# Patient Record
(demographics unavailable — no encounter records)

---

## 2018-07-23 NOTE — RAD
HISTORY: SOB



COMPARISONS: December 18, 2015



TECHNIQUE: Multiple contiguous axial CT scans of the chest were obtained after the

administration of nonionic intravenous contrast, timed to the pulmonary arterial phase of

contrast enhancement.. Coronal and sagittal multiplanar reformations are also submitted

for review.



FINDINGS: Evaluation is limited by suboptimal contrast opacification. The attenuation of

the main pulmonary artery is less than 200 Hounsfield units which is considered

nondiagnostic for the detection of pulmonary embolism.



NECK AND THYROID: The lower neck and thyroid are unremarkable.

CHEST WALL: There is no lower cervical, axillary, or supraclavicular lymphadenopathy by

size criteria.



HEART AND PERICARDIUM: The heart is unremarkable.

AORTA AND PULMONARY VASCULATURE: As noted above, the attenuation of the main pulmonary

artery is not considered to be diagnostic for the detection of pulmonary embolism. There

is no large or proximal filling defect to suggest pulmonary embolism. The aorta is

unremarkable for technique.



MEDIASTINUM: There is no mediastinal lymphadenopathy by size criteria.

ISHA: There is a 1 cm short axis right hilar lymph node.



AIRWAY AND ESOPHAGUS: The airway is unremarkable, without endobronchial filling defect.

The esophagus is grossly normal.

LUNG PARENCHYMA: There is patchy alveolar opacification of the lung bases bilaterally.

There is calcified granuloma of the lingula.

PLEURA: No pleural abnormalities are noted.



UPPER ABDOMEN: There is fatty infiltration of the liver.

BONES AND SOFT TISSUES: Degenerative changes are noted of the spine.



OTHER: None.



IMPRESSION: 

1.  THE ATTENUATION OF THE MAIN PULMONARY ARTERY IS LESS THAN 200 HOUNSFIELD UNITS WHICH

IS CONSIDERED NONDIAGNOSTIC FOR THE DETECTION OF PULMONARY EMBOLISM. THE CHANGE FROM THE

PULMONARY REPORT WAS DISCUSSED WITH DR. OPLLACK AT APPROXIMATELY 8:09 AM ON JULY 23, 2018.

2.  PATCHY AIRSPACE DISEASE OF THE LOWER LUNGS BILATERALLY.

3.  BORDERLINE ENLARGED RIGHT HILAR LYMPH NODE.

4.  FATTY INFILTRATION OF THE LIVER..



R3

## 2018-07-23 NOTE — XMS REPORT
Ysabel Vazquez

 Created on:2018



Patient:Ysabel Vazquez

Sex:Female

:1958

External Reference #:[id number]





Demographics







 Address  [address]



   [place] [address]

 

 Mobile Phone  [phone]

 

 Email Address  [email or web address] [email or web address]

 

 Preferred Language  English

 

 Marital Status  Not  Or 

 

 Restoration Affiliation  Unknown

 

 Race  White

 

 Ethnic Group  Not  Or 









Author







 Organization  [facility]

 

 Address  [address]



   [place] [address]

 

 Phone  [phone]









Support







 Name  Relationship  Address  Phone

 

 Ijeoma Hightower  Unavailable  Unavailable  [phone]









Care Team Providers







 Name  Role  Phone

 

 Sushma Hameed MD  Primary Care Physician  Unavailable









Payers







 Type  Date  Identification Numbers  Payment Provider  Subscriber

 

 Commercial  Effective:  Policy Number: [id number]  BS Neela Vazquez



   2012      









 PayID: [id number]  [address] [address]









 [place] [address]









 Medigap Part B  Effective:  Policy Number:  Blue Shield Ppo  Ysabel GUADARRAMA George



   2008  [id number]    









 Expires: 2011  PayID: [id number]  [address] [address]









 [place] [address]









 Medigap Part B  Effective: 2011  Policy Number:  BS Neela Vazquez



     [id number]    









 Expires: 2011  PayID: [id number]  [address] [address]









 [place] [address]









 Workers Compensation  Effective:  Group Number:  Lisaerik GUADARRAMA George



   2014    Center  









 Onset: 2014  [address] [place] [address]







Problems







 Date  Description  Provider  Status

 

 Onset: 2014  Pure hyperglyceridemia  Sushma Hameed M.D.  Active

 

 Onset: 2014  Type 2 diabetes mellitus  Sushma Hameed M.D.  Active

 

 Onset: 2014  Obstructive sleep apnea syndrome  Sushma Hameed M.D.  
Active

 

 Onset: 2014  Hypothyroidism  Sushma Hameed M.D.  Active









   Note: XRT for graves









 Onset: 10/09/2014  Obstructive sleep apnea of adult  Iván Newby M.D.  Active

 

 Onset: 10/09/2014  Asthma without status asthmaticus  Iván Newby M.D.  Active

 

 Onset: 2015  Endometrial carcinoma  Sushma Hameed M.D.  Active









   Note: R2hG1E5 s/p hysterectomy & adjuvant XRT ( intravaginal bracytherapy )









 Onset: 2015  Former heavy tobacco smoker  Sushma Hameed M.D.  Active









   Note: 32 pk yr quit   CTA chest nl in 12/15









 Onset: 2015  Psoriasis  Sushma Hameed M.D.  Active









   Note: stable









 Onset: 2016  Morbid obesity  Lindsey Johnston MD  Active

 

 Onset: 2016  Osteoarthritis of knee  Sushma Hameed M.D.  Active

 

 Onset: 2017  Chronic obstructive lung disease  Sushma Hameed M.D.  
Active

 

 Onset: 2015  Hernia of anterior abdominal wall  Sushma Hameed M.D.  
Resolved









 Resolved: 2016









   Note: 6.2 cm conting loops of bowel







Family History







 Date  Family Member(s)  Problem(s)  Comments

 

   General  lung ca  mother

 

   General  breast ca  sister at age 60

 

   General  Diabetes, Non Insulin Dependent  sister ( age 62)

 

   General  Bladder Cancer  brother at age 63

 

   Children  3  







Social History







 Type  Date  Description  Comments

 

 Marital Status    Single  

 

 Lives With    Alone  Has 3 children

 

 Occupation    Currently Working  [employer] (



       personal needs )

 

 Work Status    Currently Working  

 

 Cigarette Use    Former Cigarette Smoker  

 

 ETOH Use    Denies alcohol use  

 

 Smoking    Patient is a former smoker  32 pk yr quit in 

 

 Recreational Drug Use    Denies Drug Use  

 

 Daily Caffeine    Consumes on average 1 cup of  



     hot tea per day  

 

 Exercise Type/Frequency    Exercises rarely  

 

 Exercise Type/Frequency    Exercises sporadically  walking







Allergies, Adverse Reactions, Alerts







 Date  Description  Reaction  Status  Severity  Comments

 

 2014  Levaquin  Urticaria  active    

 

 2013  NKDA    inactive    







Medications







 Medication  Date  Status  Form  Strength  Qnty  SIG  Indications  Ordering



                 Provider

 

 Prednisone    Active  Tablets  10mg  30tab  30mg daily  J44.1  Lindsey



   /        s  for 1 week,    Vickie,



             20mg daily    MD



             for 1 week,    



             10 mg daily    



             for 1 week    

 

 Doxycycline    Active  Capsules  100mg  14cap  one tablet  J44.1  Lindsey



 Hyclate  /        s  twice daily    Vickie,



             for 7 days.    MD Goodson    Active  Tablets  50mg  90tab  1 by mouth  E11.9  Sushma



           s  every day    JAVIER Hameed

 

 Cholestyramine    Active  Packet  4gm  1unit  4 gms every    Sushma



           s  day as    Zari,



             needed    M.D.

 

 Atorvastatin    Active  Tablets  20mg  90tab  take one  E78.2  Sushma



 Calcium          s  tablet by    Zari



             mouth at    M.D.



             bedtime    

 

 Cardizem CD    Active  Caps ER  120mg  90cap  1 by mouth  R00.2  Sushma



       24HR    s  every day    JAVIER Hameed

 

 Symbicort    Active  Aerosol  160-4.5mc  1unit  1 puffs  J45.909  Lindsey      g/Act  s  puffs twice    erik Johnston MD    Active  Device    1unit  check    Sushma



 Blood Glucose          s  fingerstick    Zari,



 Monitoring            three times    M.D.



 System            daily or as    



             needed - DX:    



             250.00    

 

 Freestyle Lite    Active  Strips    100un  test up to    Sushma



 Test          its  3times a day    Zari,



             or as needed    M.D.



              dx code:    



             250.00    

 

 Freestyle    Active  Misc    100un  Test three    Sushma



 Lancets          its  times daily    Zari,



             or as needed    M.D.



             - .00    

 

 Metformin HCL    Active  Tablets  850mg  60tab  take one            s  tablet by    char Hameed twice    M.D.



             a day    

 

 Albuterol    Active  Nebulizer  (2.5mg/3M  225ml  as needed 4    Lindsey



 Sulfate  /      L) 0.083%    times  a day    MD Vickie

 

 Ventolin HFA    Active  Aerosol  108(90Bas  1unit  2 puffs by    Lindsey



   /0000      e)  s  mouth four    Vickie,



         mcg/Act    times a day    MD



             as needed    

 

 Mucinex    Active  Tablets ER  600mg  60tab  1 tab bid po    Unknown



       12HR    s  prn    

 

 Imodium A-D    Active  Tablets  2mg        Unknown



                 

 

 Levothyroxine    Active  Tablets  175mcg  90tab  take one    Sushma



 Sodium          s  tablet by    char Hameed every    M.D.



             day    

 

 Bipap  00/00  Active  Device      use at at  G47.33  Unknown



   /0000          bedtime    

 

 Bipap Mask And  00/00  Active  Device      bipap  G47.33  Unknown



 Supplies  /0000          supplies -    



             headgear,    



             cushion,    



             tubing,    



             filters,    



             water    



             chamber for    



             sleep apnea    



             dx G43.77    

 

                 

 

 Cephalexin    Hx  Tablets  500mg    1 tab every              6 hrs X 7    Hameed,



   -          days    M.D.



                 

 

 Atorvastatin  03/15  Hx  Tablets  20mg  90tab  take one  E78.2  Sushma



 Calcium  /2016        s  tablet by    Hameed,



   -          mouth at    M.D.



             bedtime    



   /              

 

 Januvia  03/15  Hx  Tablets  25mg  30tab  take one  E11.9  Sushma



           s  tablet by    Zari,



   -          mouth every    M.D.



             day    



   /              

 

 Fluconazole    Hx  Tablets  100mg  10tab  1 tab by  110.8  Sushma



           s  mouth daily    Zari,



   -          x 10 days    M.D.



                 

 

 Warfarin Sodium    Hx  Tablets  10mg  60tab  Take one by    Sushma



           s  mouth 5 days    Hameed,



   -          a week    M.D.



             (takes 5 mg.    



   /          2 days a    



             week) or as    



             directed    

 

 Freestyle Lite    Hx      100un  use as    Sushma



 Test Strip  /        its  directed    Hameed,



   -          three times    M.D.



             daily or as    



             needed dx:    



             250.00    

 

 Ferrous Fumarate    Hx  Tablets  324mg  60tab  1 tab daily    Sushma        s      Zari,



   -              M.D.



   03/15              



   /2016              

 

 Stacy Contour    Hx  Strips    100un  test three    Sushma



 Blood Glucose  /        its  times daily    Hameed,



 Test Strips  -          or as needed    M.D.



                 

 

 Stacy Microlet    Hx  Misc    100un  three times    Sushma



 Lancets          its  daily or as    Hameed,



   -          needed    M.D.



                 

 

 Polytrim    Hx  Solution  25679-8.1  1unit  1 drop both  372.00  Christopher



   2015      Unit/ML-%  s  eyes every    French,



   -          four hours    NP



             while awake    



             x's 5 days    

 

 Mometasone    Hx  Cream  0.1%  1unit  apply to  691.8  Sushma



 Fur        s  affected    Zari,



   -          areas twice    M.D.



   /10          a day 10    



   /2015          days only    

 

 Keflex    Hx  Capsules  500mg  40cap  1 by mouth            s  four times a    Ordering



   -          day for 10    Provider



   12/11          days.    



                 

 

 Olux    Hx  Foam  0.05%  1unit  apply daily  696.8          s  X 10 days    Quang Hameed M.D.



                 

 

 Gemfibrozil    Hx  Tablets  600mg  180ta  Take One            bs  Tablet By    Zari,



   -          Mouth Twice    M.D.



             A Day    



   /              

 

 Advair Diskus    Hx  Aerosol  500-50mcg    1 puff bid  V72.      /Dose        Quang Hameed              M.DKelley



                 

 

 Metformin HCL    Hx  Tablets  500mg  180ta  1 po bid  V72.84          Quang Bacon M.D.



                 

 

 Metformin HCL    Hx  Tablets  500mg  45tab  1 po bid X 1  V784  [facility]



           s  week and    REYNALDO Mason,



   -          then 2 tab    M.D.,FACP



             in Am and           tab in PM    

 

 Advair Diskus    Hx  Aerosol  250-50mcg  1unit  1 puff bid  V72.84  
      /Dose  s      Quang Hameed M.D.



                 

 

 Azithromycin    Hx  Tablets  250mg    as directed    Unknown



   /0000              



   -              



                 

 

 Prednisone    Hx  Tablets  20mg    2 po qd    Unknown



   /              



   -              



                 

 

 Metformin HCL    Hx  Tablets  500mg    1 po qd    Unknown



   /              



   -              



                 

 

 Coumadin    Hx  Tablets  5mg  150ta  as directed.            Quang Bacon M.D.



                 







Immunizations







 CPT Code  Status  Date  Vaccine  Reaction  Lot #

 

 [id number]  Given  2017  Influenza Virus Vaccine, Quadrivalent,    



       Split, Preservative Free    

 

 [id number]  Given  10/18/2016  Influenza Virus Vaccine, Quadrivalent,    [id number]



       Split Virus, Im Use    

 

 83500  Given  2015  Influenza Virus Vaccine, Quadrivalent,    [id number]



       Split, Preservative Free    

 

 70536  Given  2015  Pneumococcal Conjugate Vaccine 13    [id number]



       Valent For Intramuscular Use    

 

 51881  Given  10/06/2014  Influenza Virus Vaccine, Quadrivalent,  no reaction  
[id number]



       Split, Preservative Free    

 

 01579  Given  2014  Pneumonia Vaccine    [id number]







Vital Signs







 Date  Vital  Result  Comment

 

 2018  Height  61 inches  5'1"









 Weight  333.00 lb  

 

 Heart Rate  88 /min  

 

 BP Systolic Sitting  134 mmHg  

 

 BP Diastolic Sitting  70 mmHg  

 

 Respiratory Rate  14 /min  

 

 O2 % BldC Oximetry  95 %  

 

 BMI (Body Mass Index)  62.9 kg/m2  









 2017  Weight  329.00 lb  









 Heart Rate  89 /min  

 

 Body Temperature  97.8 F  

 

 O2 % BldC Oximetry  95 %  









 2017  Height  61 inches  5'1"









 Weight  314.00 lb  

 

 Heart Rate  93 /min  

 

 BP Systolic  120 mmHg  

 

 BP Diastolic  70 mmHg  

 

 Body Temperature  97.5 F  

 

 O2 % BldC Oximetry  96 %  

 

 BMI (Body Mass Index)  59.3 kg/m2  









 2017  Height  61 inches  5'1"









 Weight  317.50 lb  no shoes

 

 Heart Rate  86 /min  

 

 BP Systolic Sitting  128 mmHg  Lfa lrg cuff

 

 BP Diastolic Sitting  80 mmHg  Lfa lrg cuff

 

 BP Systolic Standing  130 mmHg  Lfa lrg cuff

 

 BP Diastolic Standing  84 mmHg  Lfa lrg cuff

 

 Respiratory Rate  20 /min  

 

 BMI (Body Mass Index)  60.0 kg/m2  









 10/19/2016  Height  61 inches  5'1"









 Weight  306.00 lb  

 

 Heart Rate  106 /min  

 

 BP Systolic Sitting  126 mmHg  

 

 BP Diastolic Sitting  78 mmHg  

 

 Respiratory Rate  18 /min  

 

 Body Temperature  98.6 F  

 

 BMI (Body Mass Index)  57.8 kg/m2  









 10/18/2016  Height  61 inches  5'1"









 Weight  309.00 lb  

 

 Heart Rate  96 /min  

 

 BP Systolic  146 mmHg  

 

 BP Diastolic  74 mmHg  

 

 BP Systolic Recheck  138 mmHg  

 

 BP Diastolic Recheck  72 mmHg  

 

 Body Temperature  99.1 F  

 

 O2 % BldC Oximetry  97 %  

 

 BMI (Body Mass Index)  58.4 kg/m2  









 2016  Height  61 inches  5'1"









 Weight  303.00 lb  

 

 Heart Rate  78 /min  

 

 BP Systolic Sitting  138 mmHg  

 

 BP Diastolic Sitting  78 mmHg  

 

 Respiratory Rate  16 /min  

 

 O2 % BldC Oximetry  98 %  

 

 BMI (Body Mass Index)  57.2 kg/m2  









 2016  Height  61 inches  5'1"









 Weight  303.00 lb  

 

 Heart Rate  83 /min  

 

 BP Systolic  140 mmHg  

 

 BP Diastolic  70 mmHg  

 

 Body Temperature  97.9 F  

 

 O2 % BldC Oximetry  96 %  

 

 BMI (Body Mass Index)  57.2 kg/m2  









 03/15/2016  Weight  311.00 lb  









 Heart Rate  86 /min  

 

 BP Systolic Sitting  151 mmHg  

 

 BP Diastolic Sitting  73 mmHg  

 

 O2 % BldC Oximetry  97 %  









 2016  Height  61 inches  5'1"









 Weight  307.00 lb  

 

 Heart Rate  102 /min  

 

 BP Systolic  132 mmHg  Ra lower, reg cuff

 

 BP Diastolic  78 mmHg  Ra lower, reg cuff

 

 BP Systolic Sitting  130 mmHg  LA lower, reg cuff

 

 BP Diastolic Sitting  78 mmHg  LA lower, reg cuff

 

 BP Systolic Standing  128 mmHg  Ra lower, reg cuff

 

 BP Diastolic Standing  780 mmHg  Ra lower, reg cuff

 

 Respiratory Rate  16 /min  

 

 BMI (Body Mass Index)  58.0 kg/m2  









 2015  Weight  310.00 lb  









 Heart Rate  92 /min  

 

 BP Systolic Sitting  128 mmHg  

 

 BP Diastolic Sitting  84 mmHg  

 

 Respiratory Rate  14 /min  

 

 Body Temperature  98.3 F  

 

 O2 % BldC Oximetry  97 %  









 2015  Weight  297.00 lb  









 Heart Rate  91 /min  

 

 BP Systolic Sitting  128 mmHg  

 

 BP Diastolic Sitting  62 mmHg  

 

 Body Temperature  97.7 F  

 

 O2 % BldC Oximetry  96 %  









 2015  Height  60.25 inches  5'0.25"









 Weight  298.00 lb  

 

 Heart Rate  95 /min  

 

 BP Systolic  128 mmHg  

 

 BP Diastolic  80 mmHg  

 

 Respiratory Rate  16 /min  

 

 O2 % BldC Oximetry  98 %  

 

 BMI (Body Mass Index)  57.7 kg/m2  









 2015  Height  60.25 inches  5'0.25"









 Weight  298.00 lb  

 

 Heart Rate  87 /min  

 

 BP Systolic  147 mmHg  

 

 BP Diastolic  80 mmHg  

 

 Body Temperature  98.5 F  

 

 BMI (Body Mass Index)  57.7 kg/m2  









 06/10/2015  Height  60.25 inches  5'0.25"









 Weight  298.00 lb  

 

 Heart Rate  90 /min  

 

 BP Systolic Sitting  140 mmHg  

 

 BP Diastolic Sitting  84 mmHg  

 

 Body Temperature  98.3 F  

 

 O2 % BldC Oximetry  96 %  

 

 BMI (Body Mass Index)  57.7 kg/m2  









 2015  Weight  316.00 lb  









 Heart Rate  100 /min  

 

 BP Systolic Sitting  126 mmHg  

 

 BP Diastolic Sitting  80 mmHg  

 

 Body Temperature  97.3 F  

 

 O2 % BldC Oximetry  94 %  









 2015  Height  60.25 inches  5'0.25"









 Weight  312.50 lb  

 

 Heart Rate  93 /min  

 

 BP Systolic Sitting  134 mmHg  

 

 BP Diastolic Sitting  70 mmHg  

 

 Body Temperature  97.9 F  

 

 BMI (Body Mass Index)  60.5 kg/m2  









 2014  Height  60.25 inches  5'0.25"









 Weight  319.00 lb  

 

 Heart Rate  80 /min  

 

 BP Systolic Sitting  130 mmHg  left forearm

 

 BP Diastolic Sitting  78 mmHg  left forearm

 

 Respiratory Rate  16 /min  

 

 Body Temperature  99.0 F  

 

 O2 % BldC Oximetry  97 %  Room air

 

 BMI (Body Mass Index)  61.8 kg/m2  









 2014  Weight  329.00 lb  









 Heart Rate  72 /min  

 

 BP Systolic Sitting  128 mmHg  

 

 BP Diastolic Sitting  84 mmHg  









 10/09/2014  Height  60.25 inches  5'0.25"









 Weight  330.00 lb  w/shoes

 

 Heart Rate  100 /min  

 

 BP Systolic Sitting  140 mmHg  L forearm

 

 BP Diastolic Sitting  68 mmHg  L forearm

 

 Respiratory Rate  16 /min  

 

 Body Temperature  100.4 F  

 

 O2 % BldC Oximetry  95 %  

 

 BMI (Body Mass Index)  63.9 kg/m2  

 

 Neck Circumference in inches  17  









 10/06/2014  Height  60.25 inches  5'0.25"









 Weight  330.00 lb  

 

 Heart Rate  88 /min  

 

 BP Systolic Sitting  134 mmHg  

 

 BP Diastolic Sitting  82 mmHg  

 

 BMI (Body Mass Index)  63.9 kg/m2  









 2014  Height  60.25 inches  5'0.25"









 Weight  324.50 lb  

 

 Heart Rate  93 /min  

 

 BP Systolic Sitting  126 mmHg  

 

 BP Diastolic Sitting  76 mmHg  

 

 BMI (Body Mass Index)  62.8 kg/m2  









 2014  Weight  318.00 lb  









 Heart Rate  102 /min  

 

 BP Systolic Sitting  132 mmHg  

 

 BP Diastolic Sitting  80 mmHg  









 2014  Height  60.25 inches  5'0.25"









 Weight  328.00 lb  

 

 Heart Rate  79 /min  

 

 BP Systolic Standing  128 mmHg  

 

 BP Diastolic Standing  68 mmHg  

 

 Body Temperature  97.2 F  

 

 O2 % BldC Oximetry  94 %  

 

 BMI (Body Mass Index)  63.5 kg/m2  









 2013  Height  60.25 inches  5'0.25"









 Weight  332.00 lb  

 

 Heart Rate  93 /min  

 

 BP Systolic Sitting  110 mmHg  

 

 BP Diastolic Sitting  82 mmHg  

 

 Body Temperature  98.2 F  

 

 BMI (Body Mass Index)  64.3 kg/m2  







Results







 Test  Date  Test  Result  H/L  Range  Note

 

 Laboratory test finding  2017  Hemoglobin A1c  6.8    5-7  

 

 Rapid Influenza A & B  2017  Influenza A Molecular  NEGATIVE    Negative
  1



 Molecular            









 Influenza B Molecular  NEGATIVE    Negative  









 Laboratory test  2017  Rapid Influenza A B  SEE RESULT BELOW      2



 finding    Antigen        

 

 Laboratory test  2017  TSH (Thyroid Stim  3.25 mcIU/mL    0.34-5.60  



 finding    Horm)        









 T3 Free  2.50 pg/mL    2.5-3.9  

 

 Free T4 (Free Thyroxine)  0.99 ng/dL    0.61-1.12  









 Lipid Profile (Trig/Chol/HDL)  2017  Triglycerides  254 mg/dL      3, 4









 Cholesterol  158 mg/dL      3, 5

 

 HDL Cholesterol  46.6 mg/dL      3, 6

 

 LDL Cholesterol  61 mg/dL      3, 7









 Laboratory test  2017  Hemoglobin A1c (Glyco  6.3 %  High  Less than 6.0
  3, 8



 finding    HGB)        









 Hepatitis C Antibody  Nonreactive    Nonreactive  3









 Urine Microalbumin Random  10/18/2016  Urine Creatinine  145.54 mg/dL      









 Ur Microalbumin (mg/L)  < 15.0 mg/L      

 

 Urine Microalbumin/Creatinine  TNP ug/mg    <31  9









 Liver Function Panel  10/18/2016  Total Protein  6.5 g/dL    6.4-8.9  









 Albumin  3.7 g/dL    3.2-5.2  

 

 Globulin  2.8 g/dL    2-4  

 

 Albumin/Globulin Ratio  1.3    1-3  

 

 Total Bilirubin  0.40 mg/dL    0.2-1.0  

 

 Direct Bilirubin  0.10 mg/dL    0.03-0.18  

 

 Indirect Bilirubin  0.3 mg/dL    0.3-1.0  

 

 Alkaline Phosphatase  113 U/L  High    

 

 Alt  33 U/L    7-52  

 

 Ast  23 U/L    13-39  









 Lipid Profile (Trig/Chol/HDL)  2016  Triglycerides  132 mg/dL      10









 Cholesterol  207 mg/dL      11

 

 HDL Cholesterol  33.5 mg/dL      12

 

 LDL Cholesterol  147 mg/dL      13









 Laboratory test  2016  Hemoglobin A1c (Glyco  6.0 %    Less than 6.0  14



 finding    HGB)        

 

 CBC Auto Diff  2016  White Blood Count  9.1 10^3/uL    3.5-10.8  









 Red Blood Count  4.15 10^6/uL    4.0-5.4  

 

 Hemoglobin  12.7 g/dL    12.0-16.0  

 

 Hematocrit  39 %    35-47  

 

 Mean Corpuscular Volume  94 fL    80-97  

 

 Mean Corpuscular Hemoglobin  31 pg    27-31  

 

 Mean Corpuscular HGB Conc  33 g/dL    31-36  

 

 Red Cell Distribution Width  14 %    10.5-15  

 

 Platelet Count  371 10^3/uL    150-450  

 

 Mean Platelet Volume  8 um3    7.4-10.4  

 

 Abs Neutrophils  6.8 10^3/uL    1.5-7.7  

 

 Abs Lymphocytes  1.5 10^3/uL    1.0-4.8  

 

 Abs Monocytes  0.6 10^3/uL    0-0.8  

 

 Abs Eosinophils  0.2 10^3/uL    0-0.6  

 

 Abs Basophils  0.1 10^3/uL    0-0.2  

 

 Abs Nucleated RBC  0.01 10^3/uL      

 

 Granulocyte %  74.4 %    38-83  

 

 Lymphocyte %  16.1 %  Low  25-47  

 

 Monocyte %  6.9 %    1-9  

 

 Eosinophil %  2.0 %    0-6  

 

 Basophil %  0.6 %    0-2  

 

 Nucleated Red Blood Cells %  0.1      









 Urinalysis Profile  2016  Urine Color  Straw      









 Urine Appearance  Clear      

 

 Urine Specific Gravity  1.008  Low  1.010-1.030  

 

 Urine pH  5.0    5-9  

 

 Urine Urobilinogen  Negative    Negative  

 

 Urine Ketones  Negative    Negative  

 

 Urine Protein  Negative    Negative  

 

 Urine Leukocytes  Negative    Negative  

 

 Urine Blood  Negative    Negative  

 

 Urine Nitrite  Negative    Negative  

 

 Urine Bilirubin  Negative    Negative  

 

 Urine Glucose  Negative    Negative  









 Inr/Protime  2016  Inr  0.96    0.89-1.11  

 

 Laboratory test finding  2016  Partial Thrombo Time  52.7 seconds  High  
26.0-36.3  



     PTT        









 D Dimer Quantitative  < 200 ng/mL    Less Than 230  15

 

 Lactic Acid  1.1 mmol/L    0.5-2.0  16

 

 Troponin-I (TnI)  0.00 ng/mL    <0.03  17









 Comp Metabolic Panel  2016  Sodium  135 mmol/L    133-145  









 Potassium  3.9 mmol/L    3.5-5.0  

 

 Chloride  102 mmol/L    101-111  

 

 Co2 Carbon Dioxide  23 mmol/L    22-32  

 

 Anion Gap  10 mmol/L    2-11  

 

 Glucose  153 mg/dL  High    

 

 Blood Urea Nitrogen  22 mg/dL    6-24  

 

 Creatinine  0.74 mg/dL    0.51-0.95  

 

 BUN/Creatinine Ratio  29.7  High  8-20  

 

 Calcium  9.4 mg/dL    8.6-10.3  

 

 Total Protein  7.6 g/dL    6.4-8.9  

 

 Albumin  4.0 g/dL    3.2-5.2  

 

 Globulin  3.6 g/dL    2-4  

 

 Albumin/Globulin Ratio  1.1    1-3  

 

 Total Bilirubin  0.40 mg/dL    0.2-1.0  

 

 Alkaline Phosphatase  81 U/L      

 

 Alt  9 U/L    7-52  

 

 Ast  11 U/L  Low  13-39  

 

 Egfr Non-  80.6    >60  

 

 Egfr   103.7    >60  18









 Laboratory test finding  2016  Magnesium  2.1 mg/dL    1.9-2.7  









 Amylase  35 U/L      

 

 C Reactive Protein  6.75 mg/L  High  < 5.00  19

 

 TSH (Thyroid Stim Horm)  1.47 ?IU/mL    0.34-5.60  

 

 Lipase  81 U/L    11.0-82.0  









 Laboratory test finding  2016  HPV Rna Ww/Reflex  Negative    Negative  
20, 21



     Genotype        









 Cytology  SEE RESULT BELOW      20, 22









 Comp Metabolic Panel  2016  Sodium  135 mmol/L    133-145  









 Potassium  4.3 mmol/L    3.5-5.0  

 

 Chloride  99 mmol/L  Low  101-111  

 

 Co2 Carbon Dioxide  29 mmol/L    22-32  

 

 Anion Gap  7 mmol/L    2-11  

 

 Glucose  131 mg/dL  High    

 

 Blood Urea Nitrogen  21 mg/dL    6-24  

 

 Creatinine  0.79 mg/dL    0.51-0.95  

 

 BUN/Creatinine Ratio  26.6  High  8-20  

 

 Calcium  9.7 mg/dL    8.6-10.3  

 

 Total Protein  7.8 g/dL    6.4-8.9  

 

 Albumin  4.2 g/dL    3.2-5.2  

 

 Globulin  3.6 g/dL    2-4  

 

 Albumin/Globulin Ratio  1.2    1-3  

 

 Total Bilirubin  0.40 mg/dL    0.2-1.0  

 

 Alkaline Phosphatase  85 U/L      

 

 Alt  11 U/L    7-52  

 

 Ast  11 U/L  Low  13-39  

 

 Egfr Non-  75.0    >60  

 

 Egfr   96.5    >60  23









 Laboratory test  2016  D Dimer Quantitative  < 200 ng/mL    Less Than 
230  24



 finding            

 

 CBC Auto Diff  2016  White Blood Count  6.9 10^3/uL    3.5-10.8  









 Red Blood Count  4.42 10^6/uL    4.0-5.4  

 

 Hemoglobin  13.6 g/dL    12.0-16.0  

 

 Hematocrit  41 %    35-47  

 

 Mean Corpuscular Volume  94 fL    80-97  

 

 Mean Corpuscular Hemoglobin  31 pg    27-31  

 

 Mean Corpuscular HGB Conc  33 g/dL    31-36  

 

 Red Cell Distribution Width  14 %    10.5-15  

 

 Platelet Count  372 10^3/uL    150-450  

 

 Mean Platelet Volume  8 um3    7.4-10.4  

 

 Abs Neutrophils  4.3 10^3/uL    1.5-7.7  

 

 Abs Lymphocytes  1.9 10^3/uL    1.0-4.8  

 

 Abs Monocytes  0.5 10^3/uL    0-0.8  

 

 Abs Eosinophils  0.2 10^3/uL    0-0.6  

 

 Abs Basophils  0.1 10^3/uL    0-0.2  

 

 Abs Nucleated RBC  0.01 10^3/uL      

 

 Granulocyte %  61.7 %    38-83  

 

 Lymphocyte %  26.9 %    25-47  

 

 Monocyte %  7.9 %    1-9  

 

 Eosinophil %  2.7 %    0-6  

 

 Basophil %  0.8 %    0-2  

 

 Nucleated Red Blood Cells %  0.1      









 Laboratory test finding  03/15/2016  Hemoglobin A1c  7.2  High  5-7  

 

 CBC Auto Diff  2015  White Blood Count  9.6 10^3/uL    3.5-10.8  









 Red Blood Count  4.44 10^6/uL    4.0-5.4  

 

 Hemoglobin  13.7 g/dL    12.0-16.0  

 

 Hematocrit  41 %    35-47  

 

 Mean Corpuscular Volume  93 fL    80-97  

 

 Mean Corpuscular Hemoglobin  31 pg    27-31  

 

 Mean Corpuscular HGB Conc  33 g/dL    31-36  

 

 Red Cell Distribution Width  15 %    10.5-15  

 

 Platelet Count  360 10^3/uL    150-450  

 

 Mean Platelet Volume  7 um3  Low  7.4-10.4  

 

 Abs Neutrophils  6.8 10^3/uL    1.5-7.7  

 

 Abs Lymphocytes  1.9 10^3/uL    1.0-4.8  

 

 Abs Monocytes  0.7 10^3/uL    0-0.8  

 

 Abs Eosinophils  0.1 10^3/uL    0-0.6  

 

 Abs Basophils  0.1 10^3/uL    0-0.2  

 

 Abs Nucleated RBC  0.01 10^3/uL      

 

 Granulocyte %  70.9 %    38-83  

 

 Lymphocyte %  20.3 %  Low  25-47  

 

 Monocyte %  6.9 %    1-9  

 

 Eosinophil %  1.3 %    0-6  

 

 Basophil %  0.6 %    0-2  

 

 Nucleated Red Blood Cells %  0.1      









 Inr/Protime  2015  Inr  0.97    0.89-1.11  

 

 Laboratory test finding  2015  Lactic Acid  1.6 mmol/L    0.5-2.0  25

 

 Comp Metabolic Panel  2015  Sodium  136 mmol/L    133-145  









 Potassium  4.5 mmol/L    3.5-5.0  

 

 Chloride  98 mmol/L  Low  101-111  

 

 Co2 Carbon Dioxide  30 mmol/L    22-32  

 

 Anion Gap  8 mmol/L    2-11  

 

 Glucose  194 mg/dL  High    

 

 Blood Urea Nitrogen  23 mg/dL    6-24  

 

 Creatinine  0.88 mg/dL    0.51-0.95  

 

 BUN/Creatinine Ratio  26.1  High  8-20  

 

 Calcium  10.0 mg/dL    8.6-10.3  

 

 Total Protein  7.9 g/dL    6.4-8.9  

 

 Albumin  4.3 g/dL    3.2-5.2  

 

 Globulin  3.6 g/dL    2-4  

 

 Albumin/Globulin Ratio  1.2    1-3  

 

 Total Bilirubin  0.50 mg/dL    0.2-1.0  

 

 Alkaline Phosphatase  81 U/L      

 

 Alt  11 U/L    7-52  

 

 Ast  10 U/L  Low  13-39  

 

 Egfr Non-  66.2    >60  

 

 Egfr   85.2    >60  26









 Laboratory test finding  2015  Magnesium  2.1 mg/dL    1.9-2.7  









 Troponin-I (TnI)  0.00 ng/mL    <0.03  27

 

 TSH (Thyroid Stim Horm)  1.41 ?IU/mL    0.34-5.60  









 Laboratory test finding  2015  Inr/Protime  1.97  High  0.89-1.11  28

 

 Laboratory test finding  10/27/2015  Inr/Protime  2.06  High  0.78-1.07  

 

 Inr/Protime  10/20/2015  Inr  2.33  High  0.78-1.07  

 

 Laboratory test finding  10/13/2015  Inr/Protime  3.16  High  0.78-1.07  

 

 Laboratory test finding  10/06/2015  Inr/Protime  1.74  High  0.78-1.07  

 

 Laboratory test finding  2015  Inr/Protime  1.79  High  0.78-1.07  

 

 Laboratory test finding  2015  Hemoglobin A1c  6.8    5-7  

 

 Laboratory test finding  2015  Inr/Protime  2.29  High  0.78-1.07  

 

 Laboratory test finding  2015  Inr/Protime  2.23  High  0.78-1.07  

 

 Laboratory test finding  2015  Inr/Protime  1.57  High  0.78-1.07  

 

 Laboratory test finding  2015  Inr/Protime  2.98  High  0.78-1.07  

 

 Laboratory test finding  2015  Inr/Protime  2.06  High  0.78-1.07  

 

 Inr/Protime  2015  Inr  2.13  High  0.78-1.07  

 

 Inr/Protime  2015  Inr  1.61  High  0.78-1.07  

 

 Protime W/ Inr  2015  Inr  1.3      

 

 Inr/Protime  2015  Inr  1.61  High  0.78-1.07  

 

 CBC Auto Diff  06/10/2015  White Blood Count  7.3 10^3/uL    4.8-10.8  









 Red Blood Count  3.84 10^6/uL  Low  4.0-5.4  

 

 Hemoglobin  11.3 g/dL  Low  12.0-16.0  

 

 Hematocrit  35 %    35-47  

 

 Mean Corpuscular Volume  92 fL    80-97  

 

 Mean Corpuscular Hemoglobin  30 pg    27-31  

 

 Mean Corpuscular HGB Conc  32 g/dL    31-36  

 

 Red Cell Distribution Width  16 %  High  10.5-15  

 

 Platelet Count  453 10^3/uL  High  150-450  

 

 Mean Platelet Volume  7 um3  Low  7.4-10.4  

 

 Abs Neutrophils  4.2 10^3/uL    1.5-7.7  

 

 Abs Lymphocytes  2.0 10^3/uL    1.0-4.8  

 

 Abs Monocytes  0.6 10^3/uL    0-0.8  

 

 Abs Eosinophils  0.4 10^3/uL    0-0.6  

 

 Abs Basophils  0.1 10^3/uL    0-0.2  

 

 Abs Nucleated RBC  0.01 10^3/uL      

 

 Granulocyte %  58.0 %    38-83  

 

 Lymphocyte %  27.1 %    25-47  

 

 Monocyte %  8.4 %    1-9  

 

 Eosinophil %  5.6 %    0-6  

 

 Basophil %  0.9 %    0-2  

 

 Nucleated Red Blood Cells %  0.1      









 Basic Metabolic Panel  06/10/2015  Sodium  134 mmol/L    133-145  









 Potassium  4.7 mmol/L    3.5-5.0  

 

 Chloride  99 mmol/L  Low  101-111  

 

 Co2 Carbon Dioxide  29 mmol/L    22-32  

 

 Anion Gap  6 mmol/L    2-11  

 

 Glucose  160 mg/dL  High    

 

 Blood Urea Nitrogen  21 mg/dL    6-24  

 

 Creatinine  0.62 mg/dL    0.51-0.95  

 

 BUN/Creatinine Ratio  33.9  High  8-20  

 

 Calcium  9.8 mg/dL    8.6-10.3  

 

 Egfr Non-  99.2    >60  

 

 Egfr   127.6    >60  29









 Laboratory test finding  06/10/2015  Magnesium  2.1 mg/dL    1.9-2.7  

 

 Protime W/ Inr  06/10/2015  Prothrombin Time  27.8      









 Inr  2.3      









 Urine Microalbumin Random  04/10/2015  Ur Microalbumin (mg/L)  9.0 mg/L      30









 Urine Creatinine  74.15 mg/dL      30

 

 Urine Microalbumin/Creatinine  12.1    Less Than 31  30









 CBC Auto Diff  04/10/2015  White Blood Count  8.1 10^3/uL    4.8-10.8  30









 Red Blood Count  4.34 10^6/uL    4.0-5.4  30

 

 Hemoglobin  13.2 g/dL    12.0-16.0  30

 

 Hematocrit  40 %    35-47  30

 

 Mean Corpuscular Volume  92 fL    80-97  30

 

 Mean Corpuscular Hemoglobin  30 pg    27-31  30

 

 Mean Corpuscular HGB Conc  33 g/dL    31-36  30

 

 Red Cell Distribution Width  15 %    10.5-15  30

 

 Platelet Count  338 10^3/uL    150-450  30

 

 Mean Platelet Volume  8 um3    7.4-10.4  30

 

 Abs Neutrophils  6.0 10^3/uL    1.5-7.7  30

 

 Abs Lymphocytes  1.4 10^3/uL    1.0-4.8  30

 

 Abs Monocytes  0.5 10^3/uL    0-0.8  30

 

 Abs Eosinophils  0.1 10^3/uL    0-0.6  30

 

 Abs Basophils  0.1 10^3/uL    0-0.2  30

 

 Abs Nucleated RBC  0 10^3/uL      30

 

 Granulocyte %  74.3 %    38-83  30

 

 Lymphocyte %  16.8 %  Low  25-47  30

 

 Monocyte %  6.7 %    1-9  30

 

 Eosinophil %  1.4 %    0-6  30

 

 Basophil %  0.8 %    0-2  30

 

 Nucleated Red Blood Cells %  0      30









 Lipid Profile (Trig/Chol/HDL)  04/10/2015  Triglycerides  156 mg/dL      30, 31









 Cholesterol  218 mg/dL      30, 32

 

 HDL Cholesterol  42.7 mg/dL      30, 33

 

 LDL Cholesterol  144 mg/dL      30, 34









 Laboratory test finding  04/10/2015  Free T4  0.75 ng/mL    0.61-1.12  30, 35









 Free T3  2.70 pg/mL    2.5-3.9  30, 36

 

 TSH (Thyroid Stimulating Horm)  1.66 IU/mL    0.34-5.60  30, 37









 Laboratory test finding  2015  TSH (Thyroid Stim Horm)  <pending>      









 Free T4 (Free Thyroxine)  <pending>      

 

 T3 Free  <pending>      









 Laboratory test finding  2015  Hemoglobin A1c  6.7    5-7  

 

 Laboratory test finding  2015  Lipase  103 U/L  High  11.0-82.0  









 C Reactive Protein  3.68 mg/L    < 5.00  38









 Comp Metabolic Panel  2015  Sodium  135 mmol/L    133-145  









 Potassium  3.8 mmol/L    3.5-5.0  

 

 Chloride  101 mmol/L    101-111  

 

 Co2 Carbon Dioxide  24 mmol/L    22-32  

 

 Anion Gap  10 mmol/L    2-11  

 

 Glucose  295 mg/dL  High    

 

 Blood Urea Nitrogen  13 mg/dL    6-24  

 

 Creatinine  0.83 mg/dL    0.51-0.95  

 

 BUN/Creatinine Ratio  15.7    8-20  

 

 Calcium  9.3 mg/dL    8.6-10.3  

 

 Total Protein  7.1 g/dL    6.4-8.9  

 

 Albumin  3.9 g/dL    3.2-5.2  

 

 Globulin  3.2 g/dL    2-4  

 

 Albumin/Globulin Ratio  1.2    1-3  

 

 Total Bilirubin  0.40 mg/dL    0.2-1.0  

 

 Alkaline Phosphatase  78 U/L      

 

 Alt  10 U/L    7-52  

 

 Ast  11 U/L  Low  13-39  

 

 Egfr Non-  71.1    >60  

 

 Egfr   91.5    >60  39









 CBC Auto Diff  2015  White Blood Count  15.9 10^3/uL  High  4.8-10.8  









 Red Blood Count  4.79 10^6/uL    4.0-5.4  

 

 Hemoglobin  14.2 g/dL    12.0-16.0  

 

 Hematocrit  43 %    35-47  

 

 Mean Corpuscular Volume  91 fL    80-97  

 

 Mean Corpuscular Hemoglobin  30 pg    27-31  

 

 Mean Corpuscular HGB Conc  33 g/dL    31-36  

 

 Red Cell Distribution Width  15 %    10.5-15  

 

 Platelet Count  469 10^3/uL  High  150-450  

 

 Mean Platelet Volume  8 um3    7.4-10.4  

 

 Abs Neutrophils  13.5 10^3/uL  High  1.5-7.7  

 

 Abs Lymphocytes  1.6 10^3/uL    1.0-4.8  

 

 Abs Monocytes  0.6 10^3/uL    0-0.8  

 

 Abs Eosinophils  0.1 10^3/uL    0-0.6  

 

 Abs Basophils  0.1 10^3/uL    0-0.2  

 

 Abs Nucleated RBC  0.01 10^3/uL      

 

 Granulocyte %  84.7 %  High  38-83  

 

 Lymphocyte %  10.1 %  Low  25-47  

 

 Monocyte %  3.9 %    1-9  

 

 Eosinophil %  0.8 %    0-6  

 

 Basophil %  0.5 %    0-2  

 

 Nucleated Red Blood Cells %  0      









 Blood Culture  2015  Blood Culture  (SEE NOTE)      40

 

 CBC Auto Diff  2015  White Blood Count  6.8 10^3/uL    4.8-10.8  









 Red Blood Count  3.92 10^6/uL  Low  4.0-5.4  

 

 Hemoglobin  11.7 g/dL  Low  12.0-16.0  

 

 Hematocrit  35 %    35-47  

 

 Mean Corpuscular Volume  90 fL    80-97  

 

 Mean Corpuscular Hemoglobin  30 pg    27-31  

 

 Mean Corpuscular HGB Conc  33 g/dL    31-36  

 

 Red Cell Distribution Width  15 %    10.5-15  

 

 Platelet Count  301 10^3/uL    150-450  

 

 Mean Platelet Volume  8 um3    7.4-10.4  

 

 Abs Neutrophils  5.1 10^3/uL    1.5-7.7  

 

 Abs Lymphocytes  0.8 10^3/uL  Low  1.0-4.8  

 

 Abs Monocytes  0.8 10^3/uL    0-0.8  

 

 Abs Eosinophils  0 10^3/uL    0-0.6  

 

 Abs Basophils  0 10^3/uL    0-0.2  

 

 Abs Nucleated RBC  0 10^3/uL      

 

 Granulocyte %  75.0 %    38-83  

 

 Lymphocyte %  12.5 %  Low  25-47  

 

 Monocyte %  11.4 %  High  1-9  

 

 Eosinophil %  0.6 %    0-6  

 

 Basophil %  0.5 %    0-2  

 

 Nucleated Red Blood Cells %  0      









 Comp Metabolic Panel  2015  Sodium  133 mmol/L    133-145  









 Potassium  4.0 mmol/L    3.5-5.0  

 

 Chloride  101 mmol/L    101-111  

 

 Co2 Carbon Dioxide  25 mmol/L    22-32  

 

 Anion Gap  7 mmol/L    2-11  

 

 Glucose  162 mg/dL  High    

 

 Blood Urea Nitrogen  18 mg/dL    6-24  

 

 Creatinine  0.68 mg/dL    0.51-0.95  

 

 BUN/Creatinine Ratio  26.5  High  8-20  

 

 Calcium  9.6 mg/dL    8.6-10.3  

 

 Total Protein  7.0 g/dL    6.4-8.9  

 

 Albumin  3.9 g/dL    3.2-5.2  

 

 Globulin  3.1 g/dL    2-4  

 

 Albumin/Globulin Ratio  1.3    1-3  

 

 Total Bilirubin  0.40 mg/dL    0.2-1.0  

 

 Alkaline Phosphatase  77 U/L      

 

 Alt  11 U/L    7-52  

 

 Ast  11 U/L  Low  13-39  

 

 Egfr Non-  89.5    >60  

 

 Egfr   115.1    >60  41









 Laboratory test finding  2015  Lactic Acid  1.0 mmol/L    0.5-2.2  









 Blood Culture  (SEE NOTE)      42









 Rapid Influenza A B  2015  Rapid Influenza A   B  (SEE NOTE)      43



 Antigen    Antigen        

 

 Laboratory test finding  2014  Blood Urea Nitrogen  13 mg/dL    6-24  

 

 Creatinine  2014  Creatinine  0.66 mg/dL    0.51-0.95  









 Egfr Non-  92.6    >60  

 

 Egfr   119.1    >60  44









 CBC Auto Diff  10/31/2014  White Blood Count  23.2 10^3/uL  High  4.8-10.8  









 Red Blood Count  4.06 10^6/uL    4.0-5.4  

 

 Hemoglobin  12.5 g/dL    12.0-16.0  

 

 Hematocrit  38 %    35-47  

 

 Mean Corpuscular Volume  92 fL    80-97  

 

 Mean Corpuscular Hemoglobin  31 pg    27-31  

 

 Mean Corpuscular HGB Conc  33 g/dL    31-36  

 

 Red Cell Distribution Width  14 %    10.5-15  

 

 Platelet Count  349 10^3/uL    150-450  

 

 Mean Platelet Volume  7 um3  Low  7.4-10.4  

 

 Abs Neutrophils  20.9 10^3/uL  High  1.5-7.7  

 

 Abs Lymphocytes  1.1 10^3/uL    1.0-4.8  

 

 Abs Monocytes  1.0 10^3/uL  High  0-0.8  

 

 Abs Eosinophils  0 10^3/uL    0-0.6  

 

 Abs Basophils  0.1 10^3/uL    0-0.2  

 

 Abs Nucleated RBC  0 10^3/uL      

 

 Granulocyte %  90.4 %  High  38-83  

 

 Lymphocyte %  4.9 %  Low  25-47  

 

 Monocyte %  4.4 %    1-9  

 

 Eosinophil %  0 %    0-6  

 

 Basophil %  0.3 %    0-2  

 

 Nucleated Red Blood Cells %  0      









 Comp Metabolic Panel  10/31/2014  Sodium  133 mmol/L    133-145  









 Potassium  4.3 mmol/L    3.7-5.6  

 

 Chloride  96 mmol/L  Low  101-111  

 

 Co2 Carbon Dioxide  28 mmol/L    22-32  

 

 Anion Gap  9 mmol/L    2-11  

 

 Glucose  206 mg/dL  High    

 

 Blood Urea Nitrogen  17 mg/dL    6-24  

 

 Creatinine  0.89 mg/dL    0.51-0.95  

 

 BUN/Creatinine Ratio  19.1    8-20  

 

 Calcium  9.2 mg/dL    8.6-10.3  

 

 Total Protein  7.3 g/dL    6.4-8.9  

 

 Albumin  3.8 g/dL    3.2-5.2  

 

 Globulin  3.5 g/dL    2-4  

 

 Albumin/Globulin Ratio  1.1    1-3  

 

 Total Bilirubin  0.60 mg/dL    0.2-1.0  

 

 Alkaline Phosphatase  83 U/L      

 

 Alt  13 U/L    7-52  

 

 Ast  16 U/L    13-39  

 

 Egfr Non-  65.6    >60  

 

 Egfr   84.4    >60  45









 Inr/Protime  10/31/2014  Inr  1.15  High  0.85-1.06  

 

 Laboratory test finding  10/31/2014  Activated Partial  42.9 seconds  High  
24.0-36.1  



     Thrombo Time        









 Lactic Acid  1.9 mmol/L    0.5-2.2  









 Arterial Blood Gas  10/09/2014  PH Arterial  7.37    7.35-7.45  46









 Pco2 Arterial  48 mmHg  High  35-45  46

 

 Po2 Arterial  59 mmHg  Low    46

 

 O2 Saturation Arterial  90.3 %  Low  95-98  46

 

 Base Excess Arterial  1.7    -2.0-2.0  46, 47

 

 Hco3 Arterial  26.0 mmol/L    -  46









 Order  10/06/2014  EKG  <pending>      

 

 Lipid Profile (Trig/Chol/HDL)  2014  Triglycerides  172 mg/dL      48









 Cholesterol  218 mg/dL      49

 

 HDL Cholesterol  39.3 mg/dL      50

 

 LDL Cholesterol  144 mg/dL      51









 Hepatitis B Helen AB  2014  Hepatitis B Surface AB  Nonreactive    
Nonreactive  



 Titer            









 Hep B Surf AB Level  < 3.10 mIU/mL    <12  52









 CBC Auto Diff  2014  White Blood Count  7.5 10^3/uL    4.8-10.8  









 Red Blood Count  4.07 10^6/uL    4.0-5.4  

 

 Hemoglobin  12.9 g/dL    12.0-16.0  

 

 Hematocrit  38 %    35-47  

 

 Mean Corpuscular Volume  94 fL    80-97  

 

 Mean Corpuscular Hemoglobin  32 pg  High  27-31  

 

 Mean Corpuscular HGB Conc  34 g/dL    31-36  

 

 Red Cell Distribution Width  14 %    10.5-15  

 

 Platelet Count  338 10^3/uL    150-450  

 

 Mean Platelet Volume  7 um3  Low  7.4-10.4  

 

 Abs Neutrophils  5.0 10^3/uL    1.5-7.7  

 

 Abs Lymphocytes  1.7 10^3/uL    1.0-4.8  

 

 Abs Monocytes  0.6 10^3/uL    0-0.8  

 

 Abs Eosinophils  0.2 10^3/uL    0-0.6  

 

 Abs Basophils  0.1 10^3/uL    0-0.2  

 

 Abs Nucleated RBC  0 10^3/uL      

 

 Granulocyte %  66.3 %    38-83  

 

 Lymphocyte %  23.3 %  Low  25-47  

 

 Monocyte %  7.5 %    1-9  

 

 Eosinophil %  2.2 %    0-6  

 

 Basophil %  0.7 %    0-2  

 

 Nucleated Red Blood Cells %  0      









 Laboratory test  2014  Hemoglobin A1c  6.7    5-7  



 finding            

 

 Laboratory test  2014  TSH (Thyroid  2.11 IU/mL    0.34-5.60  53, 54



 finding    Stimulating Horm)        

 

 Lipid Profile  2014  Triglycerides  357 mg/dL      53, 55



 (Trig/Chol/HDL)            









 Cholesterol  217 mg/dL      53, 56

 

 HDL Cholesterol  42.4 mg/dL      53, 57

 

 LDL Cholesterol  103 mg/dL      53, 58









 Laboratory test finding  2014  Hemoglobin A1c  6.9    5-7  

 

 Laboratory test finding  2014  Activated Partial  28.5 seconds    24.0-
36.1  



     Thrombo Time        

 

 Inr/Protime  2014  Inr  0.91    0.85-1.06  

 

 Comp Metabolic Panel  2014  Sodium  136 mmol/L    133-145  









 Potassium  4.3 mmol/L    3.7-5.6  

 

 Chloride  100 mmol/L  Low  101-111  

 

 Co2 Carbon Dioxide  28 mmol/L    22-32  

 

 Anion Gap  8 mmol/L    2-11  

 

 Glucose  151 mg/dL  High    

 

 Blood Urea Nitrogen  12 mg/dL    6-24  

 

 Creatinine  0.73 mg/dL    0.51-0.95  

 

 BUN/Creatinine Ratio  16.4    8-20  

 

 Calcium  9.1 mg/dL    8.6-10.3  

 

 Total Protein  6.8 g/dL    6.4-8.9  

 

 Albumin  3.9 g/dL    3.2-5.2  

 

 Globulin  2.9 g/dL    2-4  

 

 Albumin/Globulin Ratio  1.3    1-3  

 

 Total Bilirubin  0.70 mg/dL    0.2-1.0  

 

 Alkaline Phosphatase  76 U/L      

 

 Alt  20 U/L    7-52  

 

 Ast  16 U/L    13-39  

 

 Egfr Non-  82.8    >60  

 

 Egfr   106.4    >60  59









 CBC Auto Diff  2014  White Blood Count  9.5 10^3/uL    4.8-10.8  









 Red Blood Count  4.20 10^6/uL    4.0-5.4  

 

 Hemoglobin  12.7 g/dL    12.0-16.0  

 

 Hematocrit  39 %    35-47  

 

 Mean Corpuscular Volume  93 fL    80-97  

 

 Mean Corpuscular Hemoglobin  30 pg    27-31  

 

 Mean Corpuscular HGB Conc  33 g/dL    31-36  

 

 Red Cell Distribution Width  14 %    10.5-15  

 

 Platelet Count  328 10^3/uL    150-450  

 

 Mean Platelet Volume  8 um3    7.4-10.4  

 

 Abs Neutrophils  6.1 10^3/uL    1.5-7.7  

 

 Abs Lymphocytes  2.6 10^3/uL    1.0-4.8  

 

 Abs Monocytes  0.6 10^3/uL    0-0.8  

 

 Abs Eosinophils  0.2 10^3/uL    0-0.6  

 

 Abs Basophils  0.1 10^3/uL    0-0.2  

 

 Abs Nucleated RBC  0.01 10^3/uL      

 

 Granulocyte %  64.0 %    38-83  

 

 Lymphocyte %  27.1 %    25-47  

 

 Monocyte %  6.3 %    1-9  

 

 Eosinophil %  1.7 %    0-6  

 

 Basophil %  0.9 %    0-2  

 

 Nucleated Red Blood Cells %  0.1      









 Rapid Influenza A B  2014  Rapid Influenza A   B  (SEE NOTE)      60



 Antigen    Antigen        

 

 Laboratory test finding  2014  Lactic Acid  1.2 mmol/L    0.5-2.2  









 Blood Culture  (SEE NOTE)      61









 Blood Culture  2014  Blood Culture  (SEE NOTE)      62

 

 Urine Culture And Sensitivities  2014  Urine Culture  (SEE NOTE)      63

 

 Urinalysis  2014  Urine Color  Yellow      









 Urine Appearance  Clear      

 

 Urine Specific Gravity  1.005  Low  1.010-1.030  

 

 Urine Esterase  Negative    Negative  

 

 Urine Nitrate  Negative    Negative  

 

 Urine Urobilinogen  Negative E.U./dL    Negative  

 

 Urine Protein  Negative mg/dL    Negative  

 

 Urine pH  5.5    5-9  

 

 Urine Blood  Negative    Negative  

 

 Urine Ketones  Negative mg/dL    Negative  

 

 Urine Bilirubin  Negative    Negative  

 

 Urine Glucose  Negative mg/dL    Negative  









 Laboratory test finding  2013  TSH (Thyroid  3.58 miu/mL    0.34-5.60  64



     Stimulating Horm)        

 

 Urine Microalbumin  2013  Ur Microalbumin (mg/L)  23.0 mg/L      65



 Random            









 Urine Creatinine  160.2 mg/dL      

 

 Urine Microalbumin/Creatinine  14.4    Less Than 31  









 Comp Metabolic Panel  2013  Sodium  137 mmol/L    133-145  









 Potassium  4.3 mmol/L    3.5-5.0  

 

 Chloride  102 mmol/L    101-111  

 

 Co2 Carbon Dioxide  27.0 mmol/L    22-32  

 

 Anion Gap  8.0 mmol/L    2-11  

 

 Glucose  169 mg/dL  High    

 

 Blood Urea Nitrogen  9 mg/dL    6-24  

 

 Creatinine  0.70 mg/dL    0.50-1.40  

 

 BUN/Creatinine Ratio  12.9    8-20  

 

 Calcium  9.0 mg/dL    8.1-9.9  

 

 Total Protein  6.2 g/dL    6.2-8.1  

 

 Albumin  3.6 g/dL    3.6-5.4  

 

 Globulin  2.6 g/dL    2-4  

 

 Albumin/Globulin Ratio  1.4    1-3  

 

 Total Bilirubin  0.8 mg/dL    0.4-1.5  

 

 Alkaline Phosphatase  61 U/L      

 

 Alt  40 U/L    14-54  

 

 Ast  32 U/L    12-42  

 

 Egfr Non-  86.9    >60  

 

 Egfr   111.7    >60  66









 Lipid Profile (Trig/Chol/HDL)  2013  Triglycerides  751 mg/dL  High  40-
200  









 Cholesterol  271 mg/dL  High  Less than 200  

 

 HDL Cholesterol  50 mg/dL    40-60  67

 

 Cholesterol/HDL Ratio  5.4 Average  High  1-4.44  

 

 LDL Cholesterol  (SEE NOTE)    Less Than 100  68









 Laboratory test finding  2013  Hemoglobin A1c  7.2  High  5-7  

 

 Laboratory test finding  10/03/2010  Hemoglobin A1c  5.8 %    Less Than 6.0  69
, 70









 1  : [id number]

 

 2  SEE RESULT BELOW



   -----------------------------------------------------------------------------
---------------



   Name:  GEORGEYSABEL THIERRY                    : 1958    Attend Dr: Ruddy Herzog MD



   Acct:  [id number]  Unit: [id number]  AGE: 59            Location:  [facility]



   Re17                        SEX: F             Status:    REG ER



   -----------------------------------------------------------------------------
---------------



   



   SPEC: [id number]         ALLIE:       [facility] DR: Ruddy Herzog MD



   REQ:  [id number]              RECD:   



   STATUS: COMP             OTHR DR: Sushma Hameed MD



   _



   SOURCE: QUITA WATSONESC:



   ORDERED:  Flu A B Request



   



   -----------------------------------------------------------------------------
---------------



   Procedure                         Result                         Reported   
        Site



   -----------------------------------------------------------------------------
---------------



   Rapid Influenza A   B Request  Final                             [id number]
[id number]      ML



   Specimen received for Influenza A/B Molecular testing



   



   -----------------------------------------------------------------------------
---------------



   * ML - MAIN LAB ([facility])



   .



   



   



   



   



   



   



   



   



   



   



   



   



   



   



   



   



   



   



   



   



   



   



   



   



   



   



   



   ** END OF REPORT **



   



   * ML=Testing performed at Main Lab



   DEPARTMENT OF PATHOLOGY,  [address] [place] [address]



   Phone # [phone]      Fax #[phone]



   Elvis Mccray M.D. Director     [facility] # [id number]

 

 3  FASTING

 

 4  Desirable <150



   Borderline high 150-199



   High 200-499



   Very High >500

 

 5  Desirable <200



   Borderline high 200-239



   High >239

 

 6  Low <40



   Desirable: 40-60



   High: >60

 

 7  Desirable: <100 mg/dL



   Near Optimal: 100-129 mg/dL



   Borderline High: 130-159 mg/dL



   High: 160-189 mg/dL



   Very High: >189 mg/dL

 

 8  Therapeutic target for the treatment of diabetes



   [place] patients is <7% HBA1C, and in selective



   patients <6.0%.Please refer to American Diabetes



   Association Diabetic care guidelines for further



   information.

 

 9  Unable to calculate due to low microalbumin

 

 10  Desirable <150



   Borderline high 150-199



   High 200-499



   Very High >500

 

 11  Desirable <200



   Borderline high 200-239



   High >239

 

 12  Low <40



   Desirable: 40-60



   High: >60

 

 13  Desirable: <100 mg/dL



   Near Optimal: 100-129 mg/dL



   Borderline High: 130-159 mg/dL



   High: 160-189 mg/dL



   Very High: >189 mg/dL

 

 14  Therapeutic target for the treatment of diabetes



   [place] patients is <7% HBA1C, and in selective



   patients <6.0%.Please refer to American Diabetes



   Association Diabetic care guidelines for further



   information.

 

 15  **Please note:



   The following may produce a false positive D Dimer test:



   - Rheumatoid factor greater than 60 IU/ml



   - Plasma hemoglobin greater than 0.05 gm/dl



   - Bilirubin greater than 50 mg/dl



   - Lipids greater than 1000 mg/dl



   - FDP greater than 20 ug/ml

 

 16  [facility] Severe Sepsis and Septic Shock Management Bundle Measure



   requires all lactic acids initially measuring >2.0 mmol/L be



   repeated.

 

 17  Reference Range and Interpretation:



   TnI (ng/mL)             Interpretation



   Less Than 0.03 ng/mL    Not supportive of diagnosis of MI



   0.03 - 0.50 ng/mL       Indeterminate: suggest serial



   studies if clinically indicated.



   Greater than 0.5 ng/mL  Consistent with diagnosis of MI

 

 18  *******Because ethnic data is not always readily available,



   this report includes an eGFR for both -Americans and



   non- Americans.****



   The National Kidney Disease Education Program (NKDEP) does



   not endorse the use of the MDRD equation for patients that



   are not between the ages of 18 and 70, are pregnant, have



   extremes of body size, muscle mass, or nutritional status,



   or are non- or non-.



   According to the National Kidney Foundation, irrespective of



   diagnosis, the stage of the disease is based on the level of



   kidney function:



   Stage Description                      GFR(mL/min/1.73 m(2))



   1     Kidney damage with normal or decreased GFR       90



   2     Kidney damage with mild decrease in GFR          60-89



   3     Moderate decrease in GFR                         30-59



   4     Severe decrease in GFR                           15-29



   5     Kidney failure                       <15 (or dialysis)

 

 19  Acute inflammation:  >10.00

 

 20  [id number]

 

 21  The high-risk HPV types detected by the assay include: 16,



   18, 31, 33, 35, 39, 45, 51, 52, 56, 58, 59, 66, and 68.

 

 22  SEE RESULT BELOW



   -----------------------------------------------------------------------------
---------------



   Name:  YSABEL VAZQUEZ                    : 1958    Attend Dr: Reji Garza MD



   Acct:  [id number]  Unit: [id number]  AGE: 57            Location:  [facility]



   Re16                        SEX: F             Status:    REG REF



   -----------------------------------------------------------------------------
---------------



   



   SPEC: [id number]            ALLIE:       [facility] [place] [facility] DR: Reji Garza MD



   REQ:  [id number]             RECD: 



   STATUS: GUILLERMINA HUGGINS DR: Alexander Hameed MD



   _



   ORDERED:  IMAGE ANALYSIS, HPV/Thin Prep, HPV 16/18 GENE



   COMMENTS: [id number]



   



   FINAL DIAGNOSIS



   



   Negative for Intraepithelial lesion or Malignancy



   A. Vaginal



   Specimen Adequacy:



   Satisfactory of evaluation



   Patient Information:



   HPV: High risk HPV RNA testing regardless of pap results.



   HPV 16/18 Genotype Reflex



   Actual Specimen Date:         16



   LMP If Unknown:               



   Pregnant?:     N



   Post Menopausal?:             Y



   Hysterectomy?:                Y



   Previous Abnormal Pap Smears?:N



   Other Pertinent History:      Hysterectomy for complex atypical 
hyperplasia.



   Vaginal cuff post radiation.



   



   -----------------------------------------------------------------------------
---------------



   Date     Time Test            Result   Flag (u) Normal Range



   16 0930 HPV RNA RFLX GE Negative          Negative



   



   The high-risk HPV types detected by the assay include: 16,



   18, 31, 33, 35, 39, 45, 51, 52, 56, 58, 59, 66, and 68.



   -----------------------------------------------------------------------------
---------------



   



   



   Signed __________(signature on file)___________ Jethro Leon CT ([facility])  0945



   



   This Pap test was evaluated with the assistance of the Reval.[employer]Prep Test Imaging 
System. Due to



   cytologic findings at the imager microscope, comprehensive manual 
rescreening by a



   Cytotechnologist may be required.



   The Pap Smear is a screening test designed to aid in the detection of 
premalignant and



   malignant conditions of the uterine cervix.  It is not a diagnostic 
procedure and should



   not be used as the sole means of detecting cervical cancer.  Both false-
positive and false-



   negative reports do occur.  Depending on your risk status, a Pap smear 
should be obtained



   and evaluated every 1-3 years.



   



   -----------------------------------------------------------------------------
---------------



   



   ** END OF REPORT **



   



   * ML=Testing performed at Main Lab



   DEPARTMENT OF PATHOLOGY,  [address] [place] [id number]



   



   



   RUN DATE: 16               [facility] **LIVE**         
       PAGE    1



   



   -----------------------------------------------------------------------------
---------------



   Patient: ANA LUISAJOHNNIEYSABEL                     [id number]     (Continued)



   -----------------------------------------------------------------------------
---------------



   



   Phone # [phone]      Fax #[phone]



   Elvis Mccray M.D. Director     [facility] # [id number]

 

 23  *******Because ethnic data is not always readily available,



   this report includes an eGFR for both -Americans and



   non- Americans.****



   The National Kidney Disease Education Program (NKDEP) does



   not endorse the use of the MDRD equation for patients that



   are not between the ages of 18 and 70, are pregnant, have



   extremes of body size, muscle mass, or nutritional status,



   or are non- or non-.



   According to the National Kidney Foundation, irrespective of



   diagnosis, the stage of the disease is based on the level of



   kidney function:



   Stage Description                      GFR(mL/min/1.73 m(2))



   1     Kidney damage with normal or decreased GFR       90



   2     Kidney damage with mild decrease in GFR          60-89



   3     Moderate decrease in GFR                         30-59



   4     Severe decrease in GFR                           15-29



   5     Kidney failure                       <15 (or dialysis)

 

 24  **Please note:



   The following may produce a false positive D Dimer test:



   - Rheumatoid factor greater than 60 IU/ml



   - Plasma hemoglobin greater than 0.05 gm/dl



   - Bilirubin greater than 50 mg/dl



   - Lipids greater than 1000 mg/dl



   - FDP greater than 20 ug/ml

 

 25  [facility] Severe Sepsis and Septic Shock Management Bundle Measure



   requires all lactic acids initially measuring >2.0mmol/L be



   repeated.

 

 26  *******Because ethnic data is not always readily available,



   this report includes an eGFR for both -Americans and



   non- Americans.****



   The National Kidney Disease Education Program (NKDEP) does



   not endorse the use of the MDRD equation for patients that



   are not between the ages of 18 and 70, are pregnant, have



   extremes of body size, muscle mass, or nutritional status,



   or are non- or non-.



   According to the National Kidney Foundation, irrespective of



   diagnosis, the stage of the disease is based on the level of



   kidney function:



   Stage Description                      GFR(mL/min/1.73 m(2))



   1     Kidney damage with normal or decreased GFR       90



   2     Kidney damage with mild decrease in GFR          60-89



   3     Moderate decrease in GFR                         30-59



   4     Severe decrease in GFR                           15-29



   5     Kidney failure                       <15 (or dialysis)

 

 27  Reference Range and Interpretation:



   TnI (ng/mL)             Interpretation



   Less Than 0.03 ng/mL    Not supportive of diagnosis of MI



   0.03 - 0.50 ng/mL       Indeterminate: suggest serial



   studies if clinically indicated.



   Greater than 0.5 ng/mL  Consistent with diagnosis of MI

 

 28  Effective immediately, due to a laboratory mean normal



   Protime change, the reference range for the INR has changed.

 

 29  *******Because ethnic data is not always readily available,



   this report includes an eGFR for both -Americans and



   non- Americans.****



   The National Kidney Disease Education Program (NKDEP) does



   not endorse the use of the MDRD equation for patients that



   are not between the ages of 18 and 70, are pregnant, have



   extremes of body size, muscle mass, or nutritional status,



   or are non- or non-.



   According to the National Kidney Foundation, irrespective of



   diagnosis, the stage of the disease is based on the level of



   kidney function:



   Stage Description                      GFR(mL/min/1.73 m(2))



   1     Kidney damage with normal or decreased GFR       90



   2     Kidney damage with mild decrease in GFR          60-89



   3     Moderate decrease in GFR                         30-59



   4     Severe decrease in GFR                           15-29



   5     Kidney failure                       <15 (or dialysis)

 

 30  FASTING 12 HOUR

 

 31  Desirable <150



   Borderline high 150-199



   High 200-499



   Very High >500

 

 32  Desirable <200



   Borderline high 200-239



   High >239

 

 33  Low <40



   Desirable: 40-60



   High: >60

 

 34  Desirable: <100 mg/dL



   Near Optimal: 100-129 mg/dL



   Borderline High: 130-159 mg/dL



   High: 160-189 mg/dL



   Very High: >189 mg/dL

 

 35  FASTING 12 HOUR

 

 36  FASTING 12 HOUR

 

 37  FASTING 12 HOUR

 

 38  Acute inflammation:  >10.00

 

 39  *******Because ethnic data is not always readily available,



   this report includes an eGFR for both -Americans and



   non- Americans.****



   The National Kidney Disease Education Program (NKDEP) does



   not endorse the use of the MDRD equation for patients that



   are not between the ages of 18 and 70, are pregnant, have



   extremes of body size, muscle mass, or nutritional status,



   or are non- or non-.



   According to the National Kidney Foundation, irrespective of



   diagnosis, the stage of the disease is based on the level of



   kidney function:



   Stage Description                      GFR(mL/min/1.73 m(2))



   1     Kidney damage with normal or decreased GFR       90



   2     Kidney damage with mild decrease in GFR          60-89



   3     Moderate decrease in GFR                         30-59



   4     Severe decrease in GFR                           15-29



   5     Kidney failure                       <15 (or dialysis)

 

 40  RUN DATE: 02/08/15               [facility] **LIVE**       
         PAGE    1



   RUN TIME: 831             [address] [place] [address]



   Specimen Inquiry



   



   -----------------------------------------------------------------------------
---------------



   Name:  YSABEL VAZQUEZ                    : 1958    Attend Dr: Ruddy Moser MD



   Acct:  [id number]  Unit: [id number]  AGE: 56            Location:  [facility]



   Re/03/15                        SEX: F             Status:    DEP ER



   -----------------------------------------------------------------------------
---------------



   



   SPEC: [id number]         ALLIE:   02/03/    [facility] DR: Ashley BALDWIN



   REQ:  [id number]              RECD:   02/03/



   STATUS: COMP             OTHR DR: Sushma Moser MD



   _



   SOURCE: BLOOD,VENO     SPDESC:



   ORDERED:  Blood Cult



   



   -----------------------------------------------------------------------------
---------------



   Procedure                         Result                         Verified   
        Site



   -----------------------------------------------------------------------------
---------------



   Aerobic Culture Bottle  Final                                    02/08/15-
831      ML



   No Growth Day 5



   



   Anaerobic Culture Bottle  Final                                  02/08/15-
831      ML



   No Growth Day 5



   



   -----------------------------------------------------------------------------
---------------



   



   



   



   



   



   



   



   



   



   



   



   



   



   



   



   



   



   



   



   



   



   



   



   



   



   ** END OF REPORT **



   



   * ML=Testing performed at Main Lab



   DEPARTMENT OF PATHOLOGY,  [address] [place] [address]



   Phone # [phone]      Fax #[phone]



   Elvis Mccray M.D. Director     [facility] # [id number]

 

 41  *******Because ethnic data is not always readily available,



   this report includes an eGFR for both -Americans and



   non- Americans.****



   The National Kidney Disease Education Program (NKDEP) does



   not endorse the use of the MDRD equation for patients that



   are not between the ages of 18 and 70, are pregnant, have



   extremes of body size, muscle mass, or nutritional status,



   or are non- or non-.



   According to the National Kidney Foundation, irrespective of



   diagnosis, the stage of the disease is based on the level of



   kidney function:



   Stage Description                      GFR(mL/min/1.73 m(2))



   1     Kidney damage with normal or decreased GFR       90



   2     Kidney damage with mild decrease in GFR          60-89



   3     Moderate decrease in GFR                         30-59



   4     Severe decrease in GFR                           15-29



   5     Kidney failure                       <15 (or dialysis)

 

 42  RUN DATE: 02/08/15               [facility] **LIVE**       
         PAGE    1



   RUN TIME: 825             [address] [place] [address]   [id number]



   Specimen Inquiry



   



   -----------------------------------------------------------------------------
---------------



   Name:  YSABEL VAZQUEZ THIERRY                    : 1958    Attend Dr: Ruddy Moser MD



   Acct:  [id number]  Unit: [id number]  AGE: 56            Location:  [facility]



   Re/03/15                        SEX: F             Status:    DEP ER



   -----------------------------------------------------------------------------
---------------



   



   SPEC: [id number]         ALLIE:   02/03/    [facility] [place] [facility] DR: Ashley BALDWIN



   REQ:  [id number]              RECD:   02/03/



   STATUS: COMP             OTHR DR: Sushma Moser MD



   _



   SOURCE: BLOOD,VENO     SPDES:



   ORDERED:  Blood Cult



   



   -----------------------------------------------------------------------------
---------------



   Procedure                         Result                         Verified   
        Site



   -----------------------------------------------------------------------------
---------------



   Aerobic Culture Bottle  Final                                    02/08/15-
25      ML



   No Growth Day 5



   



   Anaerobic Culture Bottle  Final                                  02/08/15-
25      ML



   No Growth Day 5



   



   -----------------------------------------------------------------------------
---------------



   



   



   



   



   



   



   



   



   



   



   



   



   



   



   



   



   



   



   



   



   



   



   



   



   



   ** END OF REPORT **



   



   * ML=Testing performed at Main Lab



   DEPARTMENT OF PATHOLOGY,  [facility] [place] [address]



   Phone # [phone]      Fax #[phone]



   Elvis Mccray M.D. Director     [facility] # [id number]

 

 43  RUN DATE: 02/03/15               [facility] **LIVE**       
         PAGE    1



   RUN TIME: 829             [facility] [address] [place] [address]



   Specimen Inquiry



   



   -----------------------------------------------------------------------------
---------------



   Name:  YSABEL VAZQUEZ                    : 1958    Attend Dr: Ruddy Moser MD



   Acct:  [id number]  Unit: [id number]  AGE: 56            Location:  [facility]



   Re/03/15                        SEX: F             Status:    REG ER



   -----------------------------------------------------------------------------
---------------



   



   SPEC: [id number]         ALLIE:   02/03/    [facility] [place] [facility] DR: Ashley BALDWIN



   REQ:  [id number]              RECD:   02/03/



   STATUS: COMP             OTHR DR: Sushma Moser MD



   _



   SOURCE: QUITA     [facility]:



   ORDERED:  Rapid Flu A B



   



   -----------------------------------------------------------------------------
---------------



   Procedure                         Result                         Verified   
        Site



   -----------------------------------------------------------------------------
---------------



   Rapid Influenza A   B Antigen  Final                             02/03/15-
0829      ML



   



   Organism 1                     Negative Influenza A B



   



   Antigen testing by enzyme immunoassay.



   



   Cell culture testing can be performed to confirm



   negative test results and to assist in detecting other



   viruses that can produce similar clinical symptoms.



   Please notify Microbiology Lab if further testing is



   desired.



   



   -----------------------------------------------------------------------------
---------------



   



   



   



   



   



   



   



   



   



   



   



   



   



   



   



   



   



   



   



   ** END OF REPORT **



   



   * ML=Testing performed at Main Lab



   DEPARTMENT OF PATHOLOGY,  [address] [place] [address]



   Phone # [phone]      Fax #[phone]



   Elvis Mccray M.D. Director     [facility] # [id number]

 

 44  *******Because ethnic data is not always readily available,



   this report includes an eGFR for both -Americans and



   non- Americans.****



   The National Kidney Disease Education Program (NKDEP) does



   not endorse the use of the MDRD equation for patients that



   are not between the ages of 18 and 70, are pregnant, have



   extremes of body size, muscle mass, or nutritional status,



   or are non- or non-.



   According to the National Kidney Foundation, irrespective of



   diagnosis, the stage of the disease is based on the level of



   kidney function:



   Stage Description                      GFR(mL/min/1.73 m(2))



   1     Kidney damage with normal or decreased GFR       90



   2     Kidney damage with mild decrease in GFR          60-89



   3     Moderate decrease in GFR                         30-59



   4     Severe decrease in GFR                           15-29



   5     Kidney failure                       <15 (or dialysis)

 

 45  *******Because ethnic data is not always readily available,



   this report includes an eGFR for both -Americans and



   non- Americans.****



   The National Kidney Disease Education Program (NKDEP) does



   not endorse the use of the MDRD equation for patients that



   are not between the ages of 18 and 70, are pregnant, have



   extremes of body size, muscle mass, or nutritional status,



   or are non- or non-.



   According to the National Kidney Foundation, irrespective of



   diagnosis, the stage of the disease is based on the level of



   kidney function:



   Stage Description                      GFR(mL/min/1.73 m(2))



   1     Kidney damage with normal or decreased GFR       90



   2     Kidney damage with mild decrease in GFR          60-89



   3     Moderate decrease in GFR                         30-59



   4     Severe decrease in GFR                           15-29



   5     Kidney failure                       <15 (or dialysis)

 

 46  Verbal to [id number] by [id number] at 1540 on 10/09/14.~Results read back 
accurately.

 

 47  Reference ranges based on room air.

 

 48  Desirable <150



   Borderline high 150-199



   High 200-499



   Very High >500

 

 49  Desirable <200



   Borderline high 200-239



   High >239

 

 50  Low <40



   Desirable: 40-60



   High: >60

 

 51  Desirable <100



   Near Optimal 100-129



   Borderline high 130-159



   High 160-189



   Very High >189

 

 52  This assay does not differentiate between reactivity due to



   a vaccine-induced immune response or an immune response



   induced by infection with HBV.

 

 53  FASTING

 

 54  FASTING

 

 55  Desirable <150



   Borderline high 150-199



   High 200-499



   Very High >500

 

 56  Desirable <200



   Borderline high 200-239



   High >239

 

 57  Low <40



   Desirable: 40-60



   High: >60

 

 58  Desirable <100



   Near Optimal 100-129



   Borderline high 130-159



   High 160-189



   Very High >189

 

 59  *******Because ethnic data is not always readily available,



   this report includes an eGFR for both -Americans and



   non- Americans.****



   The National Kidney Disease Education Program (NKDEP) does



   not endorse the use of the MDRD equation for patients that



   are not between the ages of 18 and 70, are pregnant, have



   extremes of body size, muscle mass, or nutritional status,



   or are non- or non-.



   According to the National Kidney Foundation, irrespective of



   diagnosis, the stage of the disease is based on the level of



   kidney function:



   Stage Description                      GFR(mL/min/1.73 m(2))



   1     Kidney damage with normal or decreased GFR       90



   2     Kidney damage with mild decrease in GFR          60-89



   3     Moderate decrease in GFR                         30-59



   4     Severe decrease in GFR                           15-29



   5     Kidney failure                       <15 (or dialysis)

 

 60  RUN DATE: 14               [facility] **LIVE**       
         PAGE    1



   RUN TIME: 4077             [address] [place] [address]   [id number]



   Specimen Inquiry



   



   -----------------------------------------------------------------------------
---------------



   Name:  YSABEL VAZQUEZ                    : 1958    Attend Dr: Myke Mazariegos MD



   Acct:  [id number]  Unit: [id number]  AGE: 55            Location:  [facility]



   Re14                        SEX: F             Status:    REG ER



   -----------------------------------------------------------------------------
---------------



   



   SPEC: [id number]         ALLIE:       [facility] [place] [facility] DR: Brad Huggins MD



   REQ:  [id number]              RECD:   



   STATUS: COMP             OTHR DR: [facility]



   Sushma Hameed MD



   _



   SOURCE: QUITA     [facility]:



   ORDERED:  Rapid Flu A B



   



   -----------------------------------------------------------------------------
---------------



   Procedure                         Result                         Verified   
        Site



   -----------------------------------------------------------------------------
---------------



   Rapid Influenza A   B Antigen  Final                             14-
0750      ML



   



   Organism 1                     Negative Influenza A B



   



   Antigen testing by enzyme immunoassay.



   



   Cell culture testing can be performed to confirm



   negative test results and to assist in detecting other



   viruses that can produce similar clinical symptoms.



   Please notify Microbiology Lab if further testing is



   desired.



   



   -----------------------------------------------------------------------------
---------------



   



   



   



   



   



   



   



   



   



   



   



   



   



   



   



   



   



   



   



   ** END OF REPORT **



   



   * ML=Testing performed at Main Lab



   DEPARTMENT OF PATHOLOGY,  [facility] [place], [place] [address]



   Phone # [phone]      Fax #[phone]



   Elvis Mccray M.D. Director     [place] [employer]  #[id number]

 

 61  RUN DATE: 14               [facility] **LIVE**       
         PAGE    1



   RUN TIME: 0850             [facility] [address] [place], [place]   [address]



   Specimen Inquiry



   



   -----------------------------------------------------------------------------
---------------



   Name:  YSABEL VAZQUEZ                    : 1958    Attend Dr: Myke Mazariegos MD



   Acct:  [id number]  Unit: [id number]  AGE: 55            Location:  [facility]



   Re14                        SEX: F             Status:    DEP ER



   -----------------------------------------------------------------------------
---------------



   



   SPEC: [id number]         ALLIE:   45    [facility] [place] [facility] DR: Myke Mazariegos MD



   REQ:  [id number]              RECD:   



   STATUS: COMP             OTHR DR: Sushma Hameed MD



   _



   SOURCE: BLOOD,VENO     SPDESC:



   ORDERED:  Blood Cult



   



   -----------------------------------------------------------------------------
---------------



   Procedure                         Result                         Verified   
        Site



   -----------------------------------------------------------------------------
---------------



   Aerobic Culture Bottle  Final                                    14-
0850      ML



   No Growth Day 5



   



   Anaerobic Culture Bottle  Final                                  14-
0850      ML



   No Growth Day 5



   



   -----------------------------------------------------------------------------
---------------



   



   



   



   



   



   



   



   



   



   



   



   



   



   



   



   



   



   



   



   



   



   



   



   



   



   



   ** END OF REPORT **



   



   * ML=Testing performed at Main Lab



   DEPARTMENT OF PATHOLOGY,  [facility] [place], [place] [address]



   Phone # [phone]      Fax #[phone]



   Elvis Mccray M.D. Director     [place] [employer] [facility]  #[id number]

 

 62  RUN DATE: 14               [facility] **LIVE**       
         PAGE    1



   RUN TIME: 0850             [facility] [address] [place], [place]   [address]



   Specimen Inquiry



   



   -----------------------------------------------------------------------------
---------------



   Name:  YSABEL VAZQUEZ                    : 1958    Attend Dr: Myke Mazariegos MD



   Acct:  [id number]  Unit: [id number]  AGE: 55            Location:  [facility]



   Re14                        SEX: F             Status:    DEP ER



   -----------------------------------------------------------------------------
---------------



   



   SPEC: [id number]         ALLIE:       [facility] [place] [facility] DR: Myke Mazariegos MD



   REQ:  [id number]              RECD:   



   STATUS: COMP             OTHR DR: Sushma Hameed MD



   _



   SOURCE: BLOOD,VENO     SPDESC:



   ORDERED:  Blood Cult



   



   -----------------------------------------------------------------------------
---------------



   Procedure                         Result                         Verified   
        Site



   -----------------------------------------------------------------------------
---------------



   Aerobic Culture Bottle  Final                                    14-
0850      ML



   No Growth Day 5



   



   Anaerobic Culture Bottle  Final                                  14-
0850      ML



   No Growth Day 5



   



   -----------------------------------------------------------------------------
---------------



   



   



   



   



   



   



   



   



   



   



   



   



   



   



   



   



   



   



   



   



   



   



   



   



   



   



   ** END OF REPORT **



   



   * ML=Testing performed at Main Lab



   DEPARTMENT OF PATHOLOGY,  [facility] [address] [place], [place] [address]



   Phone # [phone]      Fax #[phone]



   Elvis Mccray M.D. Director     [place] Permit  #[id number]

 

 63  RUN DATE: 14               [facility] **LIVE**       
         PAGE    1



   RUN TIME:              [address] [place], [place]   [address]



   Specimen Inquiry



   



   -----------------------------------------------------------------------------
---------------



   Name:  YSABEL VAZQUEZ                    : 1958    Attend Dr: Myke Mazariegos MD



   Acct:  [id number]  Unit: [id number]  AGE: 55            Location:  ED



   Re14                        SEX: F             Status:    DEP ER



   -----------------------------------------------------------------------------
---------------



   



   SPEC: [id number]         ALLIE:       [facility] [place] [facility] DR: Myke Mazariegos MD



   REQ:  [id number]              RECD:   



   STATUS: COMP             OTHR DR: Sushma Hameed MD



   _



   SOURCE: URINE          SPDESC:



   ORDERED:  Urine Culture



   



   -----------------------------------------------------------------------------
---------------



   Procedure                         Result                         Verified   
        Site



   -----------------------------------------------------------------------------
---------------



   Urine Culture  Final                                             14-
1011      ML



   



   Mixed sugar; possible contamination. Suggest



   resubmission.



   



   



   -----------------------------------------------------------------------------
---------------



   



   



   



   



   



   



   



   



   



   



   



   



   



   



   



   



   



   



   



   



   



   



   



   



   



   



   ** END OF REPORT **



   



   * ML=Testing performed at Main Lab



   DEPARTMENT OF PATHOLOGY,  [address] [place] [address]



   Phone # [phone]      Fax #[phone]



   Elvis Mccray M.D. Director     [place] Permit  #[id number]

 

 64  FASTING

 

 65  Microalbuminuria in a random sample is defined as:



   Microalbumin/Creatinine ratio of  ug/mg.

 

 66  *******Because ethnic data is not always readily available,



   this report includes an eGFR for both -Americans and



   non- Americans.****



   The National Kidney Disease Education Program (NKDEP) does



   not endorse the use of the MDRD equation for patients that



   are not between the ages of 18 and 70, are pregnant, have



   extremes of body size, muscle mass, or nutritional status,



   or are non- or non-.



   According to the National Kidney Foundation, irrespective of



   diagnosis, the stage of the disease is based on the level of



   kidney function:



   Stage Description                      GFR(mL/min/1.73 m(2))



   1     Kidney damage with normal or decreased GFR       90



   2     Kidney damage with mild decrease in GFR          60-89



   3     Moderate decrease in GFR                         30-59



   4     Severe decrease in GFR                           15-29



   5     Kidney failure                       <15 (or dialysis)

 

 67  HDL Interpretation:



   Undesirable: High Risk:  Less than 40 mg/dL



   Desirable:  Low Risk:  Greater than 60 mg/dL

 

 68  Unable to calculate LDL as triglyceride is > 400

 

 69  COMMENTS: N

 

 70  THERAPEUTIC TARGET FOR THE TREATMENT OF DIABETES



   MELLITUS PATIENTS IS <7% HBA1C, AND IN SELECTIVE



   PATIENTS <6.0%.  PLEASE REFER TO AMERICAN DIABET[employer]



   ASSOCIATION DIABETIC CARE GUIDELINES FOR FURTHER



   INFORMATION.







Procedures







 Date  CPT Code  Description  Status  Comment

 

 2017  46402  EKG Tracing & Interpretation  Completed  

 

 2016  66440  EKG Tracing & Interpretation  Completed  

 

 2016  02131  Holter Monitoring 24 HR New  Completed  

 

 2016  88419  Holter Monitoring 24 HR New  Completed  

 

 2015    Diabetic Retinal Eye Exam  Completed  

 

 10/09/2014  59343  Spirometry Incl Graphic Record, Timed  Completed  



     Expiratory Flow Rate    

 

 10/06/2014  08508  EKG Tracing & Interpretation  Completed  

 

 2014    Mammogram  Completed  

 

 2014    Diabetic Retinal Eye Exam  Completed  

 

 10/29/2008    Colonoscopy  Completed  

 

 49    Colonoscopy  Completed  per pt ( nl)







Encounters







 Type  Date  Location  Provider  CPT E/M  Dx

 

 Office Visit  2018  [facility]  Lindsey Johnston MD  [address]  
J44.1



   9:15a  [facility]      









 G47.33

 

 E66.01

 

 K21.9









 Office Visit  2017  8:10a  [facility]  Sushma Hameed M.D.  
[address]7551  E11.9



     - [place]      









 E11.9

 

 J44.9

 

 J44.9

 

 D18.03

 

 R93.8

 

 Z91.19

 

 D18.03

 

 Z91.19









 Office Visit  2017 11:48a  [facility]  Melvin Dave,  [address]
  J44.1



     [facility]    



     Hospitalists      









 I10

 

 E78.5

 

 G47.33









 Office Visit  2017 11:47a  [facility]  Josy Naylor N.PKelley  
[address]  J44.1



     Hospitalists      









 G47.33

 

 I10

 

 E78.5









 Office Visit  2017 11:46a  [facility]  Devin Solo,  
[address]  J44.1



     Hospitalists  N.P.    









 G47.33

 

 I10

 

 E78.5









 Office Visit  2017 10:10a  [facility]  Sushma Hameed M.D.  
06700  E11.9



     - [place]      









 D18.03

 

 E78.2

 

 E03.8

 

 Z12.31

 

 F32.89









 Office Visit  2017 10:20a  [facility]   Silas Figueredo DO  
01393  I47.1



     [facility]  FACC    

 

 Office Visit  10/19/2016  2:15p  [facility]  Iván Lima MD,  
90743  E66.01



     [facility]  FACS    









 R19.7









 Office Visit  10/18/2016 10:10a  [facility]  Sushma Hameed M.D.  
99332  K52.9



     - [place]      









 E78.4

 

 E11.9

 

 Z23

 

 F43.20

 

 D18.03

 

 L60.3









 Office Visit  2016 10:00a  [facility]  Lindsey Johnston MD  
15101  J45.909



     [facility]      









 G47.33

 

 E66.01









 Office Visit  2016 10:50a  [facility]  Sushma Hameed  [address]354
  L03.114



     - [facility]    









 F43.20









 Office Visit  03/15/2016 10:30a  [facility]  Sushma Hameed M.D.  
76008  E11.9



     - Jorge Luis      









 C54.1

 

 E78.2

 

 F43.20









 Office Visit  2016  1:00p  [facility]   Silas Figueredo DO  
85918  R00.2



     [facility]    









 E11.9

 

 G47.33

 

 E03.8

 

 J44.9

 

 E66.8









 Office Visit  2015 11:50a  [facility]  Sushma Hameed M.D.  
38243  R00.2



     - Jorge Luis      









 B34.9









 Office Visit  2015  9:50a  [facility]  Sushma Hameed  [address]32
  250.00



     - Jorge Luis HERRERA    









 110.1

 

 V03.82

 

 V04.81

 

 453.82

 

 110.8

 

 300.00









 Office Visit  2015  9:00a  [facility]  Iván Newby M.D.  53496
  493.90



     [facility]      









 327.23









 Office Visit  2015 10:50a  [facility]  Sushma Hameed  [address]506
  787.02



     - Jorge Luis HERRERA    









 300.00

 

 453.82









 Office Visit  06/10/2015 11:50a  [facility]  Sushma Hameed,  [address]
  453.82



     - Jorge Luis HERRERA    









 729.82

 

 V45.89

 

 285.1

 

 250.00

 

 V58.61









 Office Visit  2015  9:30a  [facility] [place]  
[address]  372.00



     [address]      









 461.8

 

 461.9









 Office Visit  2015 10:10a  [facility]  Sushma Hameed  [address]298
  250.00



     - Jorge Luis HERRERA    









 272.1

 

 691.8

 

 244.8

 

 288.60









 Office Visit  2014 10:00a  [facility]  Iván Newby M.D.  98888
  493.00



     [facility]      









 327.23









 Office Visit  2014  1:50p  [facility] -  Sushma Hameed,  
00441  682.8



     Jorge Luis HERRERA    

 

 Office Visit  2014  9:39a  [facility],  Luis Felipe Dyson,  
[address]  486



     Hospitalists  M.REYNALDO    









 496

 

 038.9

 

 278.00









 Office Visit  2014  9:38a  [facility],  Luis Felipe Dyson,  
[address]  486



     Hospitalists  M.DKelley    









 496

 

 038.9

 

 278.00









 Office Visit  10/31/2014  9:37a  [facility],  Devin Solo,  
[address]  486



     Hospitalists  N.P.    









 496

 

 038.9

 

 278.00









 Office Visit  10/09/2014  1:30p  [facility]  Iván Newby M.D.  [address]162
  327.23



     [facility]      









 493.00









 Office Visit  10/06/2014 12:10p  [facility]  Sushma Hameed,  [address]
  V72.84



     - Jorge Luis HERRERA    









 627.0

 

 272.1

 

 250.00

 

 327.23

 

 244.8

 

 696.8

 

 V04.81

 

 278.01

 

 493.90









 Office Visit  2014 10:50a  [facility]  Sushma Hameed,  [address]45
  250.00



     - Jorge Luis HERRERA    









 493.90

 

 696.8

 

 703.8

 

 627.0

 

 272.1

 

 V03.82









 Office Visit  2014  1:10p  [facility]  Sushma Hameed M.D.  
69241  847.0



     - Jorge Luis      









 847.0









 Office Visit  2014  2:30p  [facility]  Sushma Hameed  63727
  250.00



     - Jorge Luis HERRERA    









 272.1

 

 327.23

 

 244.8

 

 493.90









 Office Visit  2013 11:10a  [facility]  Sushma Hameed  [address]56
  493.90



     - Jorge Luis HERRERA    









 477.9

 

 244.8

 

 250.02









 Office Visit  2013  9:15a  [facility]  Ijeoma Hurt,  
[address]61  841.0



     Of [facility]    

 

 Office Visit  2012  8:15a  [facility]  Ijeoma Hurt  
65914  719.44



     Of [facility]    







Plan of Care

Future Appointment(s):2018 10:30 am - Lindsey Johnston MD at [facility]2018  9:50 am - Sushma Hameed M.D. at [facility] 
[facility]2018 - Lindsey Johnston MDJ44.1 Chronic 
obstructive pulmonary disease w (acute) exacerbationNew Medication:Prednisone 
10 mgDoxycycline Hyclate 100 mgFollow up:2 crsguxZ63.33 Obstructive sleep apnea 
(adult) (pediatric)E66.01 Morbid (severe) obesity due to excess sgtgoyeeF81.9 
Gastro-esophageal reflux disease without esophagitisComments:Try Ranitidine 
150mg daily at night for 1 month

## 2018-07-23 NOTE — XMS REPORT
Ysabel Vazquez

 Created on:2018



Patient:Ysabel Vazquez

Sex:Female

:1958

External Reference #:[id number]





Demographics







 Address  [address]



   [place] [address]

 

 Mobile Phone  [phone]

 

 Email Address  [email or web address]

 

 Preferred Language  English

 

 Marital Status  Not  Or 

 

 Hindu Affiliation  Unknown

 

 Race  White

 

 Ethnic Group  Not  Or 









Author







 Organization  [facility]

 

 Address  [address]



   [place] [address]

 

 Phone  [phone]









Support







 Name  Relationship  Address  Phone

 

 Ijeoma Hightower  Unavailable  Unavailable  [phone]









Care Team Providers







 Name  Role  Phone

 

 Sushma Hameed MD  Primary Care Physician  Unavailable









Payers







 Type  Date  Identification Numbers  Payment Provider  Subscriber

 

 Commercial  Effective:  Policy Number: [id number]  BS Neela Vazquez



   2012      









 PayID: [id number]  [address] [address]









 [place] [address]









 Medigap Part B  Effective:  Policy Number:  Blue Shield Ppo  Ysabel GUADARRAMA George



   2008  [id number]    









 Expires: 2011  PayID: [id number]  [address] [address]









 [place] [id number]









 Medigap Part B  Effective: 2011  Policy Number:  BS Neela Vazquez



     [id number]    









 Expires: 2011  PayID: [id number]  [address] [address]









 [place] [id number]









 Workers Compensation  Effective:  Group Number:  Lisaerik GUADARRAMA George



   2014    Center  









 Onset: 2014  [address] [place] [address]







Problems







 Date  Description  Provider  Status

 

 Onset: 2014  Pure hyperglyceridemia  Sushma Hameed M.D.  Active

 

 Onset: 2014  Type 2 diabetes mellitus  Sushma Hameed M.D.  Active

 

 Onset: 2014  Obstructive sleep apnea syndrome  Sushma Hameed M.D.  
Active

 

 Onset: 2014  Hypothyroidism  Sushma Hameed M.D.  Active









   Note: XRT for graves









 Onset: 10/09/2014  Obstructive sleep apnea of adult  Iván Newby M.D.  Active

 

 Onset: 10/09/2014  Asthma without status asthmaticus  Iván Newby M.D.  Active

 

 Onset: 2015  Endometrial carcinoma  Sushma Hameed M.D.  Active









   Note: [id number] s/p hysterectomy & adjuvant XRT ( intravaginal bracytherapy )









 Onset: 2015  Former heavy tobacco smoker  Sushma Hameed M.D.  Active









   Note: 32 pk yr quit   CTA chest nl in 12/15









 Onset: 2015  Psoriasis  Sushma Hameed M.D.  Active









   Note: stable









 Onset: 2016  Morbid obesity  Lindsey Johnston MD  Active

 

 Onset: 2016  Osteoarthritis of knee  Sushma Hameed M.D.  Active

 

 Onset: 2017  Chronic obstructive lung disease  Sushma Hameed M.D.  
Active

 

 Onset: 2015  Hernia of anterior abdominal wall  Sushma Hameed M.D.  
Resolved









 Resolved: 2016









   Note: 6.2 cm conting loops of bowel







Family History







 Date  Family Member(s)  Problem(s)  Comments

 

   General  lung ca  mother

 

   General  breast ca  sister at age 60

 

   General  Diabetes, Non Insulin Dependent  sister ( age 62)

 

   General  Bladder Cancer  brother at age 63

 

   Children  3  







Social History







 Type  Date  Description  Comments

 

 Marital Status    Single  

 

 Lives With    Alone  Has 3 children

 

 Occupation    Currently Working  [employer] (



       personal needs )

 

 Work Status    Currently Working  

 

 Cigarette Use    Former Cigarette Smoker  

 

 ETOH Use    Denies alcohol use  

 

 Smoking    Patient is a former smoker  32 pk yr quit in 

 

 Recreational Drug Use    Denies Drug Use  

 

 Daily Caffeine    Consumes on average 1 cup of  



     hot tea per day  

 

 Exercise Type/Frequency    Exercises rarely  

 

 Exercise Type/Frequency    Exercises sporadically  walking







Allergies, Adverse Reactions, Alerts







 Date  Description  Reaction  Status  Severity  Comments

 

 2014  Levaquin  Urticaria  active    

 

 2013  NKDA    inactive    







Medications







 Medication  Date  Status  Form  Strength  Qnty  SIG  Indications  Ordering



                 Provider

 

 Januvia    Active  Tablets  100mg  90tab  1 by mouth  E11.9          s  every day    JAVIER Hameed

 

 Lisinopril    Active  Tablets  5mg  30tab  1 by mouth  I10          s  every day    JAVIER Hameed

 

                 

 

 Prednisone    Hx  Tablets  10mg  30tab  30mg daily  J44.1          s  for 1 week,    Quang Johnston          20mg daily    MD



             for 1 week,    



   /          10 mg daily    



             for 1 week    

 

 Cholestyramine    Active  Packet  4gm  1unit  4 gms every    Sushma        s  day as    Zari,



             needed    M.DKelley

 

 Atorvastatin    Active  Tablets  20mg  90tab  take one  E78.2  Sushma



 Calcium          s  tablet by    Zari,



             mouth at    M.D.



             bedtime    

 

 Cardizem CD    Active  Caps ER  120mg  90cap  1 by mouth  R00.2  Sushma



       24HR    s  every day    JAVIER Hameed

 

 Symbicort    Active  Aerosol  160-4.5mc  1unit  1 puffs  J45.909  Lindsey      g/Act  s  puffs twice    erik Johnston MD Lite    Active  Device    1unit  check    Sushma



 Blood Glucose          s  fingerstick    Zari,



 Monitoring            three times    M.D.



 System            daily or as    



             needed - DX:    



             250.00    

 

 Freestyle Lite    Active  Strips    100un  test up to    Sushma



 Test          its  3times a day    Zari,



             or as needed    M.D.



              dx code:    



             250.00    

 

 Freestyle    Active  Misc    100un  Test three    Sushma



 Lancets          its  times daily    Zari,



             or as needed    M.D.



             - .00    

 

 Metformin HCL    Active  Tablets  850mg  60tab  take one            s  tablet by    Zari



             mouth twice    M.D.



             a day    

 

 Albuterol    Active  Nebulizer  (2.5mg/3M  225ml  as needed 4    Lindsey



 Sulfate  /      L) 0.083%    times  a day    MD Vickie

 

 Ventolin HFA    Active  Aerosol  108(90Bas  1unit  2 puffs by    Lindsey



   /      e)  s  mouth four    Vickie,



         mcg/Act    times a day    MD



             as needed    

 

 Mucinex    Active  Tablets ER  600mg  60tab  1 tab bid po    Unknown



       12HR    s  prn    

 

 Imodium A-D    Active  Tablets  2mg        Unknown



   /0000              

 

 Levothyroxine    Active  Tablets  175mcg  90tab  take one    Sushma



 Sodium          s  tablet by    Hameed



             mouth every    M.D.



             day    

 

 Bipap  00  Active  Device      use at at  G47.33  Unknown



   /0000          bedtime    

 

 Bipap Mask And  0000  Active  Device      bipap  G47.33  Unknown



 Supplies  /0000          supplies -    



             headgear,    



             cushion,    



             tubing,    



             filters,    



             water    



             chamber for    



             sleep apnea    



             dx G43.77    

 

                 

 

 Doxycycline    Hx  Capsules  100mg  14cap  one tablet  J44.1  Lindsey



 Hyclate          s  twice daily    Vickie,



   -          for 7 days.    MD



                 

 

 Januvia    Hx  Tablets  50mg  90tab  1 by mouth  E11.9  Sushma



           s  every day    Zari,



   -              M.D.



                 

 

 Cephalexin    Hx  Tablets  500mg    1 tab every    Sushma          6 hrs X 7    Hameed,



   -          days    M.D.



                 

 

 Atorvastatin  03/15  Hx  Tablets  20mg  90tab  take one  E78.2  Sushma



 Calcium  /2016        s  tablet by    Zari,



   -          mouth at    M.D.



             bedtime    



                 

 

 Januvia  03/15  Hx  Tablets  25mg  30tab  take one  E11.9  Sushma



           s  tablet by    Zari,



   -          mouth every    M.D.



   11/27          day    



   /              

 

 Fluconazole    Hx  Tablets  100mg  10tab  1 tab by  110.8  Sushma



           s  mouth daily    Zari,



   -          x 10 days    M.D.



                 

 

 Warfarin Sodium    Hx  Tablets  10mg  60tab  Take one by    Sushma



           s  mouth 5 days    Zari,



   -          a week    M.D.



             (takes 5 mg.    



             2 days a    



             week) or as    



             directed    

 

 Freestyle Lite    Hx      100un  use as    Sushma



 Test Strip          its  directed    Hameed,



   -          three times    M.D.



             daily or as    



             needed dx:    



             250.00    

 

 Ferrous Fumarate    Hx  Tablets  324mg  60tab  1 tab daily    Sushma



   /        s      Zari,



   -              M.D.



   03/15              



   /2016              

 

 Stacy Contour    Hx  Strips    100un  test three    Sushma



 Blood Glucose          its  times daily    Hameed,



 Test Strips  -          or as needed    M.D.



                 

 

 Stacy Microlet    Hx  Misc    100un  three times    Sushma



 Lancets          its  daily or as    Hameed,



   -          needed    M.D.



                 

 

 Polytrim    Hx  Solution  57773-5.1  1unit  1 drop both  372.00  Christopher



         Unit/ML-%  s  eyes every    French,



   -          four hours    NP



             while awake    



             x's 5 days    

 

 Mometasone    Hx  Cream  0.1%  1unit  apply to  691.8  Sushma



 Fur        s  affected    Zari,



   -          areas twice    M.D.



   06/10          a day 10    



   /2015          days only    

 

 Keflex    Hx  Capsules  500mg  40cap  1 by mouth            s  four times a    Ordering



   -          day for 10    Provider



             days.    



   /              

 

 Olux    Hx  Foam  0.05%  1unit  apply daily  696.8  Sushma        s  X 10 days    Zari



   -              M.DKelley



                 

 

 Gemfibrozil    Hx  Tablets  600mg  180ta  Take One            bs  Tablet By    Zari,



   -          Mouth Twice    M.D.



             A Day    



   /              

 

 Advair Diskus    Hx  Aerosol  500-50mcg    1 puff bid  V72.84        /Dose        Zari



   -              M.D.



                 

 

 Metformin HCL    Hx  Tablets  500mg  180ta  1 po bid  V72.84          bs      Zari



   -              M.DKelley



                 

 

 Metformin HCL    Hx  Tablets  500mg  45tab  1 po bid X 1  V72.84  Maxim Reese



   /        s  week and    REYNALDO Mason,



   -          then 2 tab    M.D.,FACP



             in Am and           tab in PM    

 

 Advair Diskus    Hx  Aerosol  250-50mcg  1unit  1 puff bid  V72.84  
Sushma      /Dose  s      Zari



   -              M.DKelley



                 

 

 Azithromycin    Hx  Tablets  250mg    as directed    Unknown



   /0000              



   -              



                 

 

 Prednisone    Hx  Tablets  20mg    2 po qd    Unknown



   /0000              



   -              



                 

 

 Metformin HCL    Hx  Tablets  500mg    1 po qd    Unknown



   /0000              



   -              



                 

 

 Coumadin    Hx  Tablets  5mg  150ta  as directed.            bs      Quang Hameed M.D.



                 







Immunizations







 CPT Code  Status  Date  Vaccine  Reaction  Lot #

 

 [id number]  Given  2017  Influenza Virus Vaccine, Quadrivalent,    



       Split, Preservative Free    

 

 58501  Given  10/18/2016  Influenza Virus Vaccine, Quadrivalent,    [id number]



       Split Virus, Im Use    

 

 [id number]  Given  2015  Influenza Virus Vaccine, Quadrivalent,    [id number]



       Split, Preservative Free    

 

 13986  Given  2015  Pneumococcal Conjugate Vaccine 13    [id number]



       Valent For Intramuscular Use    

 

 87959  Given  10/06/2014  Influenza Virus Vaccine, Quadrivalent,  no reaction  
[id number]



       Split, Preservative Free    

 

 19248  Given  2014  Pneumonia Vaccine    [id number]







Vital Signs







 Date  Vital  Result  Comment

 

 2018  Height  61 inches  5'1"









 Weight  332.00 lb  

 

 Heart Rate  83 /min  

 

 BP Systolic Sitting  160 mmHg  

 

 BP Diastolic Sitting  80 mmHg  

 

 O2 % BldC Oximetry  95 %  

 

 BMI (Body Mass Index)  62.7 kg/m2  









 2018  Height  61 inches  5'1"









 Weight  333.00 lb  

 

 Heart Rate  88 /min  

 

 BP Systolic Sitting  134 mmHg  

 

 BP Diastolic Sitting  70 mmHg  

 

 Respiratory Rate  14 /min  

 

 O2 % BldC Oximetry  95 %  

 

 BMI (Body Mass Index)  62.9 kg/m2  









 2017  Weight  329.00 lb  









 Heart Rate  89 /min  

 

 Body Temperature  97.8 F  

 

 O2 % BldC Oximetry  95 %  









 2017  Height  61 inches  5'1"









 Weight  314.00 lb  

 

 Heart Rate  93 /min  

 

 BP Systolic  120 mmHg  

 

 BP Diastolic  70 mmHg  

 

 Body Temperature  97.5 F  

 

 O2 % BldC Oximetry  96 %  

 

 BMI (Body Mass Index)  59.3 kg/m2  









 2017  Height  61 inches  5'1"









 Weight  317.50 lb  no shoes

 

 Heart Rate  86 /min  

 

 BP Systolic Sitting  128 mmHg  Lfa lrg cuff

 

 BP Diastolic Sitting  80 mmHg  Lfa lrg cuff

 

 BP Systolic Standing  130 mmHg  Lfa lrg cuff

 

 BP Diastolic Standing  84 mmHg  Lfa lrg cuff

 

 Respiratory Rate  20 /min  

 

 BMI (Body Mass Index)  60.0 kg/m2  









 10/19/2016  Height  61 inches  5'1"









 Weight  306.00 lb  

 

 Heart Rate  106 /min  

 

 BP Systolic Sitting  126 mmHg  

 

 BP Diastolic Sitting  78 mmHg  

 

 Respiratory Rate  18 /min  

 

 Body Temperature  98.6 F  

 

 BMI (Body Mass Index)  57.8 kg/m2  









 10/18/2016  Height  61 inches  5'1"









 Weight  309.00 lb  

 

 Heart Rate  96 /min  

 

 BP Systolic  146 mmHg  

 

 BP Diastolic  74 mmHg  

 

 BP Systolic Recheck  138 mmHg  

 

 BP Diastolic Recheck  72 mmHg  

 

 Body Temperature  99.1 F  

 

 O2 % BldC Oximetry  97 %  

 

 BMI (Body Mass Index)  58.4 kg/m2  









 2016  Height  61 inches  5'1"









 Weight  303.00 lb  

 

 Heart Rate  78 /min  

 

 BP Systolic Sitting  138 mmHg  

 

 BP Diastolic Sitting  78 mmHg  

 

 Respiratory Rate  16 /min  

 

 O2 % BldC Oximetry  98 %  

 

 BMI (Body Mass Index)  57.2 kg/m2  









 2016  Height  61 inches  5'1"









 Weight  303.00 lb  

 

 Heart Rate  83 /min  

 

 BP Systolic  140 mmHg  

 

 BP Diastolic  70 mmHg  

 

 Body Temperature  97.9 F  

 

 O2 % BldC Oximetry  96 %  

 

 BMI (Body Mass Index)  57.2 kg/m2  









 03/15/2016  Weight  311.00 lb  









 Heart Rate  86 /min  

 

 BP Systolic Sitting  151 mmHg  

 

 BP Diastolic Sitting  73 mmHg  

 

 O2 % BldC Oximetry  97 %  









 2016  Height  61 inches  5'1"









 Weight  307.00 lb  

 

 Heart Rate  102 /min  

 

 BP Systolic  132 mmHg  Ra lower, reg cuff

 

 BP Diastolic  78 mmHg  Ra lower, reg cuff

 

 BP Systolic Sitting  130 mmHg  LA lower, reg cuff

 

 BP Diastolic Sitting  78 mmHg  LA lower, reg cuff

 

 BP Systolic Standing  128 mmHg  Ra lower, reg cuff

 

 BP Diastolic Standing  780 mmHg  Ra lower, reg cuff

 

 Respiratory Rate  16 /min  

 

 BMI (Body Mass Index)  58.0 kg/m2  









 2015  Weight  310.00 lb  









 Heart Rate  92 /min  

 

 BP Systolic Sitting  128 mmHg  

 

 BP Diastolic Sitting  84 mmHg  

 

 Respiratory Rate  14 /min  

 

 Body Temperature  98.3 F  

 

 O2 % BldC Oximetry  97 %  









 2015  Weight  297.00 lb  









 Heart Rate  91 /min  

 

 BP Systolic Sitting  128 mmHg  

 

 BP Diastolic Sitting  62 mmHg  

 

 Body Temperature  97.7 F  

 

 O2 % BldC Oximetry  96 %  









 2015  Height  60.25 inches  5'0.25"









 Weight  298.00 lb  

 

 Heart Rate  95 /min  

 

 BP Systolic  128 mmHg  

 

 BP Diastolic  80 mmHg  

 

 Respiratory Rate  16 /min  

 

 O2 % BldC Oximetry  98 %  

 

 BMI (Body Mass Index)  57.7 kg/m2  









 2015  Height  60.25 inches  5'0.25"









 Weight  298.00 lb  

 

 Heart Rate  87 /min  

 

 BP Systolic  147 mmHg  

 

 BP Diastolic  80 mmHg  

 

 Body Temperature  98.5 F  

 

 BMI (Body Mass Index)  57.7 kg/m2  









 06/10/2015  Height  60.25 inches  5'0.25"









 Weight  298.00 lb  

 

 Heart Rate  90 /min  

 

 BP Systolic Sitting  140 mmHg  

 

 BP Diastolic Sitting  84 mmHg  

 

 Body Temperature  98.3 F  

 

 O2 % BldC Oximetry  96 %  

 

 BMI (Body Mass Index)  57.7 kg/m2  









 2015  Weight  316.00 lb  









 Heart Rate  100 /min  

 

 BP Systolic Sitting  126 mmHg  

 

 BP Diastolic Sitting  80 mmHg  

 

 Body Temperature  97.3 F  

 

 O2 % BldC Oximetry  94 %  









 2015  Height  60.25 inches  5'0.25"









 Weight  312.50 lb  

 

 Heart Rate  93 /min  

 

 BP Systolic Sitting  134 mmHg  

 

 BP Diastolic Sitting  70 mmHg  

 

 Body Temperature  97.9 F  

 

 BMI (Body Mass Index)  60.5 kg/m2  









 2014  Height  60.25 inches  5'0.25"









 Weight  319.00 lb  

 

 Heart Rate  80 /min  

 

 BP Systolic Sitting  130 mmHg  left forearm

 

 BP Diastolic Sitting  78 mmHg  left forearm

 

 Respiratory Rate  16 /min  

 

 Body Temperature  99.0 F  

 

 O2 % BldC Oximetry  97 %  Room air

 

 BMI (Body Mass Index)  61.8 kg/m2  









 2014  Weight  329.00 lb  









 Heart Rate  72 /min  

 

 BP Systolic Sitting  128 mmHg  

 

 BP Diastolic Sitting  84 mmHg  









 10/09/2014  Height  60.25 inches  5'0.25"









 Weight  330.00 lb  w/shoes

 

 Heart Rate  100 /min  

 

 BP Systolic Sitting  140 mmHg  L forearm

 

 BP Diastolic Sitting  68 mmHg  L forearm

 

 Respiratory Rate  16 /min  

 

 Body Temperature  100.4 F  

 

 O2 % BldC Oximetry  95 %  

 

 BMI (Body Mass Index)  63.9 kg/m2  

 

 Neck Circumference in inches  17  









 10/06/2014  Height  60.25 inches  5'0.25"









 Weight  330.00 lb  

 

 Heart Rate  88 /min  

 

 BP Systolic Sitting  134 mmHg  

 

 BP Diastolic Sitting  82 mmHg  

 

 BMI (Body Mass Index)  63.9 kg/m2  









 2014  Height  60.25 inches  5'0.25"









 Weight  324.50 lb  

 

 Heart Rate  93 /min  

 

 BP Systolic Sitting  126 mmHg  

 

 BP Diastolic Sitting  76 mmHg  

 

 BMI (Body Mass Index)  62.8 kg/m2  









 2014  Weight  318.00 lb  









 Heart Rate  102 /min  

 

 BP Systolic Sitting  132 mmHg  

 

 BP Diastolic Sitting  80 mmHg  









 2014  Height  60.25 inches  5'0.25"









 Weight  328.00 lb  

 

 Heart Rate  79 /min  

 

 BP Systolic Standing  128 mmHg  

 

 BP Diastolic Standing  68 mmHg  

 

 Body Temperature  97.2 F  

 

 O2 % BldC Oximetry  94 %  

 

 BMI (Body Mass Index)  63.5 kg/m2  









 2013  Height  60.25 inches  5'0.25"









 Weight  332.00 lb  

 

 Heart Rate  93 /min  

 

 BP Systolic Sitting  110 mmHg  

 

 BP Diastolic Sitting  82 mmHg  

 

 Body Temperature  98.2 F  

 

 BMI (Body Mass Index)  64.3 kg/m2  







Results







 Test  Date  Test  Result  H/L  Range  Note

 

 Laboratory test finding  2018  Hemoglobin A1c  7.3  High  5-7  

 

 Laboratory test finding  2017  Hemoglobin A1c  6.8    5-7  

 

 Rapid Influenza A & B  2017  Influenza A Molecular  NEGATIVE    Negative
  1



 Molecular            









 Influenza B Molecular  NEGATIVE    Negative  









 Laboratory test  2017  Rapid Influenza A B  SEE RESULT BELOW      2



 finding    Antigen        

 

 Laboratory test  2017  TSH (Thyroid Stim  3.25 mcIU/mL    0.34-5.60  



 finding    Horm)        









 T3 Free  2.50 pg/mL    2.5-3.9  

 

 Free T4 (Free Thyroxine)  0.99 ng/dL    0.61-1.12  









 Lipid Profile (Trig/Chol/HDL)  2017  Triglycerides  254 mg/dL      3, 4









 Cholesterol  158 mg/dL      3, 5

 

 HDL Cholesterol  46.6 mg/dL      3, 6

 

 LDL Cholesterol  61 mg/dL      3, 7









 Laboratory test  2017  Hemoglobin A1c (Glyco  6.3 %  High  Less than 6.0
  3, 8



 finding    HGB)        









 Hepatitis C Antibody  Nonreactive    Nonreactive  3









 Liver Function Panel  10/18/2016  Total Protein  6.5 g/dL    6.4-8.9  









 Albumin  3.7 g/dL    3.2-5.2  

 

 Globulin  2.8 g/dL    2-4  

 

 Albumin/Globulin Ratio  1.3    1-3  

 

 Total Bilirubin  0.40 mg/dL    0.2-1.0  

 

 Direct Bilirubin  0.10 mg/dL    0.03-0.18  

 

 Indirect Bilirubin  0.3 mg/dL    0.3-1.0  

 

 Alkaline Phosphatase  113 U/L  High    

 

 Alt  33 U/L    7-52  

 

 Ast  23 U/L    13-39  









 Urine Microalbumin Random  10/18/2016  Urine Creatinine  145.54 mg/dL      









 Ur Microalbumin (mg/L)  < 15.0 mg/L      

 

 Urine Microalbumin/Creatinine  TNP ug/mg    <31  9









 Lipid Profile (Trig/Chol/HDL)  2016  Triglycerides  132 mg/dL      10









 Cholesterol  207 mg/dL      11

 

 HDL Cholesterol  33.5 mg/dL      12

 

 LDL Cholesterol  147 mg/dL      13









 Laboratory test  2016  Hemoglobin A1c (Glyco  6.0 %    Less than 6.0  14



 finding    HGB)        

 

 CBC Auto Diff  2016  White Blood Count  9.1 10^3/uL    3.5-10.8  









 Red Blood Count  4.15 10^6/uL    4.0-5.4  

 

 Hemoglobin  12.7 g/dL    12.0-16.0  

 

 Hematocrit  39 %    35-47  

 

 Mean Corpuscular Volume  94 fL    80-97  

 

 Mean Corpuscular Hemoglobin  31 pg    27-31  

 

 Mean Corpuscular HGB Conc  33 g/dL    31-36  

 

 Red Cell Distribution Width  14 %    10.5-15  

 

 Platelet Count  371 10^3/uL    150-450  

 

 Mean Platelet Volume  8 um3    7.4-10.4  

 

 Abs Neutrophils  6.8 10^3/uL    1.5-7.7  

 

 Abs Lymphocytes  1.5 10^3/uL    1.0-4.8  

 

 Abs Monocytes  0.6 10^3/uL    0-0.8  

 

 Abs Eosinophils  0.2 10^3/uL    0-0.6  

 

 Abs Basophils  0.1 10^3/uL    0-0.2  

 

 Abs Nucleated RBC  0.01 10^3/uL      

 

 Granulocyte %  74.4 %    38-83  

 

 Lymphocyte %  16.1 %  Low  25-47  

 

 Monocyte %  6.9 %    1-9  

 

 Eosinophil %  2.0 %    0-6  

 

 Basophil %  0.6 %    0-2  

 

 Nucleated Red Blood Cells %  0.1      









 Urinalysis Profile  2016  Urine Color  Straw      









 Urine Appearance  Clear      

 

 Urine Specific Gravity  1.008  Low  1.010-1.030  

 

 Urine pH  5.0    5-9  

 

 Urine Urobilinogen  Negative    Negative  

 

 Urine Ketones  Negative    Negative  

 

 Urine Protein  Negative    Negative  

 

 Urine Leukocytes  Negative    Negative  

 

 Urine Blood  Negative    Negative  

 

 Urine Nitrite  Negative    Negative  

 

 Urine Bilirubin  Negative    Negative  

 

 Urine Glucose  Negative    Negative  









 Inr/Protime  2016  Inr  0.96    0.89-1.11  

 

 Laboratory test finding  2016  Partial Thrombo Time  52.7 seconds  High  
26.0-36.3  



     PTT        









 D Dimer Quantitative  < 200 ng/mL    Less Than 230  15

 

 Lactic Acid  1.1 mmol/L    0.5-2.0  16

 

 Troponin-I (TnI)  0.00 ng/mL    <0.03  17









 Comp Metabolic Panel  2016  Sodium  135 mmol/L    133-145  









 Potassium  3.9 mmol/L    3.5-5.0  

 

 Chloride  102 mmol/L    101-111  

 

 Co2 Carbon Dioxide  23 mmol/L    22-32  

 

 Anion Gap  10 mmol/L    2-11  

 

 Glucose  153 mg/dL  High    

 

 Blood Urea Nitrogen  22 mg/dL    6-24  

 

 Creatinine  0.74 mg/dL    0.51-0.95  

 

 BUN/Creatinine Ratio  29.7  High  8-20  

 

 Calcium  9.4 mg/dL    8.6-10.3  

 

 Total Protein  7.6 g/dL    6.4-8.9  

 

 Albumin  4.0 g/dL    3.2-5.2  

 

 Globulin  3.6 g/dL    2-4  

 

 Albumin/Globulin Ratio  1.1    1-3  

 

 Total Bilirubin  0.40 mg/dL    0.2-1.0  

 

 Alkaline Phosphatase  81 U/L      

 

 Alt  9 U/L    7-52  

 

 Ast  11 U/L  Low  13-39  

 

 Egfr Non-  80.6    >60  

 

 Egfr   103.7    >60  18









 Laboratory test finding  2016  Magnesium  2.1 mg/dL    1.9-2.7  









 Amylase  35 U/L      

 

 C Reactive Protein  6.75 mg/L  High  < 5.00  19

 

 TSH (Thyroid Stim Horm)  1.47 ?IU/mL    0.34-5.60  

 

 Lipase  81 U/L    11.0-82.0  









 Laboratory test finding  2016  HPV Rna Ww/Reflex  Negative    Negative  
20, 21



     Genotype        









 Cytology  SEE RESULT BELOW      20, 22









 CBC Auto Diff  2016  White Blood Count  6.9 10^3/uL    3.5-10.8  









 Red Blood Count  4.42 10^6/uL    4.0-5.4  

 

 Hemoglobin  13.6 g/dL    12.0-16.0  

 

 Hematocrit  41 %    35-47  

 

 Mean Corpuscular Volume  94 fL    80-97  

 

 Mean Corpuscular Hemoglobin  31 pg    27-31  

 

 Mean Corpuscular HGB Conc  33 g/dL    31-36  

 

 Red Cell Distribution Width  14 %    10.5-15  

 

 Platelet Count  372 10^3/uL    150-450  

 

 Mean Platelet Volume  8 um3    7.4-10.4  

 

 Abs Neutrophils  4.3 10^3/uL    1.5-7.7  

 

 Abs Lymphocytes  1.9 10^3/uL    1.0-4.8  

 

 Abs Monocytes  0.5 10^3/uL    0-0.8  

 

 Abs Eosinophils  0.2 10^3/uL    0-0.6  

 

 Abs Basophils  0.1 10^3/uL    0-0.2  

 

 Abs Nucleated RBC  0.01 10^3/uL      

 

 Granulocyte %  61.7 %    38-83  

 

 Lymphocyte %  26.9 %    25-47  

 

 Monocyte %  7.9 %    1-9  

 

 Eosinophil %  2.7 %    0-6  

 

 Basophil %  0.8 %    0-2  

 

 Nucleated Red Blood Cells %  0.1      









 Laboratory test  2016  D Dimer Quantitative  < 200 ng/mL    Less Than 
230  23



 finding            

 

 Comp Metabolic Panel  2016  Sodium  135 mmol/L    133-145  









 Potassium  4.3 mmol/L    3.5-5.0  

 

 Chloride  99 mmol/L  Low  101-111  

 

 Co2 Carbon Dioxide  29 mmol/L    22-32  

 

 Anion Gap  7 mmol/L    2-11  

 

 Glucose  131 mg/dL  High    

 

 Blood Urea Nitrogen  21 mg/dL    6-24  

 

 Creatinine  0.79 mg/dL    0.51-0.95  

 

 BUN/Creatinine Ratio  26.6  High  8-20  

 

 Calcium  9.7 mg/dL    8.6-10.3  

 

 Total Protein  7.8 g/dL    6.4-8.9  

 

 Albumin  4.2 g/dL    3.2-5.2  

 

 Globulin  3.6 g/dL    2-4  

 

 Albumin/Globulin Ratio  1.2    1-3  

 

 Total Bilirubin  0.40 mg/dL    0.2-1.0  

 

 Alkaline Phosphatase  85 U/L      

 

 Alt  11 U/L    7-52  

 

 Ast  11 U/L  Low  13-39  

 

 Egfr Non-  75.0    >60  

 

 Egfr   96.5    >60  24









 Laboratory test finding  03/15/2016  Hemoglobin A1c  7.2  High  5-7  

 

 Laboratory test finding  2015  Magnesium  2.1 mg/dL    1.9-2.7  









 Troponin-I (TnI)  0.00 ng/mL    <0.03  25

 

 TSH (Thyroid Stim Horm)  1.41 ?IU/mL    0.34-5.60  









 Comp Metabolic Panel  2015  Sodium  136 mmol/L    133-145  









 Potassium  4.5 mmol/L    3.5-5.0  

 

 Chloride  98 mmol/L  Low  101-111  

 

 Co2 Carbon Dioxide  30 mmol/L    22-32  

 

 Anion Gap  8 mmol/L    2-11  

 

 Glucose  194 mg/dL  High    

 

 Blood Urea Nitrogen  23 mg/dL    6-24  

 

 Creatinine  0.88 mg/dL    0.51-0.95  

 

 BUN/Creatinine Ratio  26.1  High  8-20  

 

 Calcium  10.0 mg/dL    8.6-10.3  

 

 Total Protein  7.9 g/dL    6.4-8.9  

 

 Albumin  4.3 g/dL    3.2-5.2  

 

 Globulin  3.6 g/dL    2-4  

 

 Albumin/Globulin Ratio  1.2    1-3  

 

 Total Bilirubin  0.50 mg/dL    0.2-1.0  

 

 Alkaline Phosphatase  81 U/L      

 

 Alt  11 U/L    7-52  

 

 Ast  10 U/L  Low  13-39  

 

 Egfr Non-  66.2    >60  

 

 Egfr   85.2    >60  26









 Laboratory test finding  2015  Lactic Acid  1.6 mmol/L    0.5-2.0  27

 

 Inr/Protime  2015  Inr  0.97    0.89-1.11  

 

 CBC Auto Diff  2015  White Blood Count  9.6 10^3/uL    3.5-10.8  









 Red Blood Count  4.44 10^6/uL    4.0-5.4  

 

 Hemoglobin  13.7 g/dL    12.0-16.0  

 

 Hematocrit  41 %    35-47  

 

 Mean Corpuscular Volume  93 fL    80-97  

 

 Mean Corpuscular Hemoglobin  31 pg    27-31  

 

 Mean Corpuscular HGB Conc  33 g/dL    31-36  

 

 Red Cell Distribution Width  15 %    10.5-15  

 

 Platelet Count  360 10^3/uL    150-450  

 

 Mean Platelet Volume  7 um3  Low  7.4-10.4  

 

 Abs Neutrophils  6.8 10^3/uL    1.5-7.7  

 

 Abs Lymphocytes  1.9 10^3/uL    1.0-4.8  

 

 Abs Monocytes  0.7 10^3/uL    0-0.8  

 

 Abs Eosinophils  0.1 10^3/uL    0-0.6  

 

 Abs Basophils  0.1 10^3/uL    0-0.2  

 

 Abs Nucleated RBC  0.01 10^3/uL      

 

 Granulocyte %  70.9 %    38-83  

 

 Lymphocyte %  20.3 %  Low  25-47  

 

 Monocyte %  6.9 %    1-9  

 

 Eosinophil %  1.3 %    0-6  

 

 Basophil %  0.6 %    0-2  

 

 Nucleated Red Blood Cells %  0.1      









 Laboratory test finding  2015  Inr/Protime  1.97  High  0.89-1.11  28

 

 Laboratory test finding  10/27/2015  Inr/Protime  2.06  High  0.78-1.07  

 

 Inr/Protime  10/20/2015  Inr  2.33  High  0.78-1.07  

 

 Laboratory test finding  10/13/2015  Inr/Protime  3.16  High  0.78-1.07  

 

 Laboratory test finding  10/06/2015  Inr/Protime  1.74  High  0.78-1.07  

 

 Laboratory test finding  2015  Inr/Protime  1.79  High  0.78-1.07  

 

 Laboratory test finding  2015  Hemoglobin A1c  6.8    5-7  

 

 Laboratory test finding  2015  Inr/Protime  2.29  High  0.78-1.07  

 

 Laboratory test finding  2015  Inr/Protime  2.23  High  0.78-1.07  

 

 Laboratory test finding  2015  Inr/Protime  1.57  High  0.78-1.07  

 

 Laboratory test finding  2015  Inr/Protime  2.98  High  0.78-1.07  

 

 Laboratory test finding  2015  Inr/Protime  2.06  High  0.78-1.07  

 

 Inr/Protime  2015  Inr  2.13  High  0.78-1.07  

 

 Inr/Protime  2015  Inr  1.61  High  0.78-1.07  

 

 Protime W/ Inr  2015  Inr  1.3      

 

 Inr/Protime  2015  Inr  1.61  High  0.78-1.07  

 

 Protime W/ Inr  06/10/2015  Prothrombin Time  27.8      









 Inr  2.3      









 Laboratory test finding  06/10/2015  Magnesium  2.1 mg/dL    1.9-2.7  

 

 Basic Metabolic Panel  06/10/2015  Sodium  134 mmol/L    133-145  









 Potassium  4.7 mmol/L    3.5-5.0  

 

 Chloride  99 mmol/L  Low  101-111  

 

 Co2 Carbon Dioxide  29 mmol/L    22-32  

 

 Anion Gap  6 mmol/L    2-11  

 

 Glucose  160 mg/dL  High    

 

 Blood Urea Nitrogen  21 mg/dL    6-24  

 

 Creatinine  0.62 mg/dL    0.51-0.95  

 

 BUN/Creatinine Ratio  33.9  High  8-20  

 

 Calcium  9.8 mg/dL    8.6-10.3  

 

 Egfr Non-  99.2    >60  

 

 Egfr   127.6    >60  29









 CBC Auto Diff  06/10/2015  White Blood Count  7.3 10^3/uL    4.8-10.8  









 Red Blood Count  3.84 10^6/uL  Low  4.0-5.4  

 

 Hemoglobin  11.3 g/dL  Low  12.0-16.0  

 

 Hematocrit  35 %    35-47  

 

 Mean Corpuscular Volume  92 fL    80-97  

 

 Mean Corpuscular Hemoglobin  30 pg    27-31  

 

 Mean Corpuscular HGB Conc  32 g/dL    31-36  

 

 Red Cell Distribution Width  16 %  High  10.5-15  

 

 Platelet Count  453 10^3/uL  High  150-450  

 

 Mean Platelet Volume  7 um3  Low  7.4-10.4  

 

 Abs Neutrophils  4.2 10^3/uL    1.5-7.7  

 

 Abs Lymphocytes  2.0 10^3/uL    1.0-4.8  

 

 Abs Monocytes  0.6 10^3/uL    0-0.8  

 

 Abs Eosinophils  0.4 10^3/uL    0-0.6  

 

 Abs Basophils  0.1 10^3/uL    0-0.2  

 

 Abs Nucleated RBC  0.01 10^3/uL      

 

 Granulocyte %  58.0 %    38-83  

 

 Lymphocyte %  27.1 %    25-47  

 

 Monocyte %  8.4 %    1-9  

 

 Eosinophil %  5.6 %    0-6  

 

 Basophil %  0.9 %    0-2  

 

 Nucleated Red Blood Cells %  0.1      









 Urine Microalbumin Random  04/10/2015  Ur Microalbumin (mg/L)  9.0 mg/L      30









 Urine Creatinine  74.15 mg/dL      30

 

 Urine Microalbumin/Creatinine  12.1    Less Than 31  30









 CBC Auto Diff  04/10/2015  White Blood Count  8.1 10^3/uL    4.8-10.8  30









 Red Blood Count  4.34 10^6/uL    4.0-5.4  30

 

 Hemoglobin  13.2 g/dL    12.0-16.0  30

 

 Hematocrit  40 %    35-47  30

 

 Mean Corpuscular Volume  92 fL    80-97  30

 

 Mean Corpuscular Hemoglobin  30 pg    27-31  30

 

 Mean Corpuscular HGB Conc  33 g/dL    31-36  30

 

 Red Cell Distribution Width  15 %    10.5-15  30

 

 Platelet Count  338 10^3/uL    150-450  30

 

 Mean Platelet Volume  8 um3    7.4-10.4  30

 

 Abs Neutrophils  6.0 10^3/uL    1.5-7.7  30

 

 Abs Lymphocytes  1.4 10^3/uL    1.0-4.8  30

 

 Abs Monocytes  0.5 10^3/uL    0-0.8  30

 

 Abs Eosinophils  0.1 10^3/uL    0-0.6  30

 

 Abs Basophils  0.1 10^3/uL    0-0.2  30

 

 Abs Nucleated RBC  0 10^3/uL      30

 

 Granulocyte %  74.3 %    38-83  30

 

 Lymphocyte %  16.8 %  Low  25-47  30

 

 Monocyte %  6.7 %    1-9  30

 

 Eosinophil %  1.4 %    0-6  30

 

 Basophil %  0.8 %    0-2  30

 

 Nucleated Red Blood Cells %  0      30









 Lipid Profile (Trig/Chol/HDL)  04/10/2015  Triglycerides  156 mg/dL      30, 31









 Cholesterol  218 mg/dL      30, 32

 

 HDL Cholesterol  42.7 mg/dL      30, 33

 

 LDL Cholesterol  144 mg/dL      30, 34









 Laboratory test finding  04/10/2015  Free T4  0.75 ng/mL    0.61-1.12  30, 35









 Free T3  2.70 pg/mL    2.5-3.9  30, 36

 

 TSH (Thyroid Stimulating Horm)  1.66 IU/mL    0.34-5.60  30, 37









 Laboratory test finding  2015  TSH (Thyroid Stim Horm)  <pending>      









 Free T4 (Free Thyroxine)  <pending>      

 

 T3 Free  <pending>      









 Laboratory test finding  2015  Hemoglobin A1c  6.7    5-7  

 

 CBC Auto Diff  2015  White Blood Count  15.9 10^3/uL  High  4.8-10.8  









 Red Blood Count  4.79 10^6/uL    4.0-5.4  

 

 Hemoglobin  14.2 g/dL    12.0-16.0  

 

 Hematocrit  43 %    35-47  

 

 Mean Corpuscular Volume  91 fL    80-97  

 

 Mean Corpuscular Hemoglobin  30 pg    27-31  

 

 Mean Corpuscular HGB Conc  33 g/dL    31-36  

 

 Red Cell Distribution Width  15 %    10.5-15  

 

 Platelet Count  469 10^3/uL  High  150-450  

 

 Mean Platelet Volume  8 um3    7.4-10.4  

 

 Abs Neutrophils  13.5 10^3/uL  High  1.5-7.7  

 

 Abs Lymphocytes  1.6 10^3/uL    1.0-4.8  

 

 Abs Monocytes  0.6 10^3/uL    0-0.8  

 

 Abs Eosinophils  0.1 10^3/uL    0-0.6  

 

 Abs Basophils  0.1 10^3/uL    0-0.2  

 

 Abs Nucleated RBC  0.01 10^3/uL      

 

 Granulocyte %  84.7 %  High  38-83  

 

 Lymphocyte %  10.1 %  Low  25-47  

 

 Monocyte %  3.9 %    1-9  

 

 Eosinophil %  0.8 %    0-6  

 

 Basophil %  0.5 %    0-2  

 

 Nucleated Red Blood Cells %  0      









 Comp Metabolic Panel  2015  Sodium  135 mmol/L    133-145  









 Potassium  3.8 mmol/L    3.5-5.0  

 

 Chloride  101 mmol/L    101-111  

 

 Co2 Carbon Dioxide  24 mmol/L    22-32  

 

 Anion Gap  10 mmol/L    2-11  

 

 Glucose  295 mg/dL  High    

 

 Blood Urea Nitrogen  13 mg/dL    6-24  

 

 Creatinine  0.83 mg/dL    0.51-0.95  

 

 BUN/Creatinine Ratio  15.7    8-20  

 

 Calcium  9.3 mg/dL    8.6-10.3  

 

 Total Protein  7.1 g/dL    6.4-8.9  

 

 Albumin  3.9 g/dL    3.2-5.2  

 

 Globulin  3.2 g/dL    2-4  

 

 Albumin/Globulin Ratio  1.2    1-3  

 

 Total Bilirubin  0.40 mg/dL    0.2-1.0  

 

 Alkaline Phosphatase  78 U/L      

 

 Alt  10 U/L    7-52  

 

 Ast  11 U/L  Low  13-39  

 

 Egfr Non-  71.1    >60  

 

 Egfr   91.5    >60  38









 Laboratory test finding  2015  Lipase  103 U/L  High  11.0-82.0  









 C Reactive Protein  3.68 mg/L    < 5.00  39









 Blood Culture  2015  Blood Culture  (SEE NOTE)      40

 

 CBC Auto Diff  2015  White Blood Count  6.8 10^3/uL    4.8-10.8  









 Red Blood Count  3.92 10^6/uL  Low  4.0-5.4  

 

 Hemoglobin  11.7 g/dL  Low  12.0-16.0  

 

 Hematocrit  35 %    35-47  

 

 Mean Corpuscular Volume  90 fL    80-97  

 

 Mean Corpuscular Hemoglobin  30 pg    27-31  

 

 Mean Corpuscular HGB Conc  33 g/dL    31-36  

 

 Red Cell Distribution Width  15 %    10.5-15  

 

 Platelet Count  301 10^3/uL    150-450  

 

 Mean Platelet Volume  8 um3    7.4-10.4  

 

 Abs Neutrophils  5.1 10^3/uL    1.5-7.7  

 

 Abs Lymphocytes  0.8 10^3/uL  Low  1.0-4.8  

 

 Abs Monocytes  0.8 10^3/uL    0-0.8  

 

 Abs Eosinophils  0 10^3/uL    0-0.6  

 

 Abs Basophils  0 10^3/uL    0-0.2  

 

 Abs Nucleated RBC  0 10^3/uL      

 

 Granulocyte %  75.0 %    38-83  

 

 Lymphocyte %  12.5 %  Low  25-47  

 

 Monocyte %  11.4 %  High  1-9  

 

 Eosinophil %  0.6 %    0-6  

 

 Basophil %  0.5 %    0-2  

 

 Nucleated Red Blood Cells %  0      









 Comp Metabolic Panel  2015  Sodium  133 mmol/L    133-145  









 Potassium  4.0 mmol/L    3.5-5.0  

 

 Chloride  101 mmol/L    101-111  

 

 Co2 Carbon Dioxide  25 mmol/L    22-32  

 

 Anion Gap  7 mmol/L    2-11  

 

 Glucose  162 mg/dL  High    

 

 Blood Urea Nitrogen  18 mg/dL    6-24  

 

 Creatinine  0.68 mg/dL    0.51-0.95  

 

 BUN/Creatinine Ratio  26.5  High  8-20  

 

 Calcium  9.6 mg/dL    8.6-10.3  

 

 Total Protein  7.0 g/dL    6.4-8.9  

 

 Albumin  3.9 g/dL    3.2-5.2  

 

 Globulin  3.1 g/dL    2-4  

 

 Albumin/Globulin Ratio  1.3    1-3  

 

 Total Bilirubin  0.40 mg/dL    0.2-1.0  

 

 Alkaline Phosphatase  77 U/L      

 

 Alt  11 U/L    7-52  

 

 Ast  11 U/L  Low  13-39  

 

 Egfr Non-  89.5    >60  

 

 Egfr   115.1    >60  41









 Laboratory test finding  2015  Lactic Acid  1.0 mmol/L    0.5-2.2  









 Blood Culture  (SEE NOTE)      42









 Rapid Influenza A B  2015  Rapid Influenza A   B  (SEE NOTE)      43



 Antigen    Antigen        

 

 Laboratory test finding  2014  Blood Urea Nitrogen  13 mg/dL    6-24  

 

 Creatinine  2014  Creatinine  0.66 mg/dL    0.51-0.95  









 Egfr Non-  92.6    >60  

 

 Egfr   119.1    >60  44









 Laboratory test finding  10/31/2014  Activated Partial  42.9 seconds  High  
24.0-36.1  



     Thrombo Time        









 Lactic Acid  1.9 mmol/L    0.5-2.2  









 Inr/Protime  10/31/2014  Inr  1.15  High  0.85-1.06  

 

 Comp Metabolic Panel  10/31/2014  Sodium  133 mmol/L    133-145  









 Potassium  4.3 mmol/L    3.7-5.6  

 

 Chloride  96 mmol/L  Low  101-111  

 

 Co2 Carbon Dioxide  28 mmol/L    22-32  

 

 Anion Gap  9 mmol/L    2-11  

 

 Glucose  206 mg/dL  High    

 

 Blood Urea Nitrogen  17 mg/dL    6-24  

 

 Creatinine  0.89 mg/dL    0.51-0.95  

 

 BUN/Creatinine Ratio  19.1    8-20  

 

 Calcium  9.2 mg/dL    8.6-10.3  

 

 Total Protein  7.3 g/dL    6.4-8.9  

 

 Albumin  3.8 g/dL    3.2-5.2  

 

 Globulin  3.5 g/dL    2-4  

 

 Albumin/Globulin Ratio  1.1    1-3  

 

 Total Bilirubin  0.60 mg/dL    0.2-1.0  

 

 Alkaline Phosphatase  83 U/L      

 

 Alt  13 U/L    7-52  

 

 Ast  16 U/L    13-39  

 

 Egfr Non-  65.6    >60  

 

 Egfr   84.4    >60  45









 CBC Auto Diff  10/31/2014  White Blood Count  23.2 10^3/uL  High  4.8-10.8  









 Red Blood Count  4.06 10^6/uL    4.0-5.4  

 

 Hemoglobin  12.5 g/dL    12.0-16.0  

 

 Hematocrit  38 %    35-47  

 

 Mean Corpuscular Volume  92 fL    80-97  

 

 Mean Corpuscular Hemoglobin  31 pg    27-31  

 

 Mean Corpuscular HGB Conc  33 g/dL    31-36  

 

 Red Cell Distribution Width  14 %    10.5-15  

 

 Platelet Count  349 10^3/uL    150-450  

 

 Mean Platelet Volume  7 um3  Low  7.4-10.4  

 

 Abs Neutrophils  20.9 10^3/uL  High  1.5-7.7  

 

 Abs Lymphocytes  1.1 10^3/uL    1.0-4.8  

 

 Abs Monocytes  1.0 10^3/uL  High  0-0.8  

 

 Abs Eosinophils  0 10^3/uL    0-0.6  

 

 Abs Basophils  0.1 10^3/uL    0-0.2  

 

 Abs Nucleated RBC  0 10^3/uL      

 

 Granulocyte %  90.4 %  High  38-83  

 

 Lymphocyte %  4.9 %  Low  25-47  

 

 Monocyte %  4.4 %    1-9  

 

 Eosinophil %  0 %    0-6  

 

 Basophil %  0.3 %    0-2  

 

 Nucleated Red Blood Cells %  0      









 Arterial Blood Gas  10/09/2014  PH Arterial  7.37    7.35-7.45  46









 Pco2 Arterial  48 mmHg  High  35-45  46

 

 Po2 Arterial  59 mmHg  Low    46

 

 O2 Saturation Arterial  90.3 %  Low  95-98  46

 

 Base Excess Arterial  1.7    -2.0-2.0  46, 47

 

 Hco3 Arterial  26.0 mmol/L    19-31  46









 Order  10/06/2014  EKG  <pending>      

 

 Lipid Profile (Trig/Chol/HDL)  2014  Triglycerides  172 mg/dL      48









 Cholesterol  218 mg/dL      49

 

 HDL Cholesterol  39.3 mg/dL      50

 

 LDL Cholesterol  144 mg/dL      51









 Hepatitis B Helen AB  2014  Hepatitis B Surface AB  Nonreactive    
Nonreactive  



 Titer            









 Hep B Surf AB Level  < 3.10 mIU/mL    <12  52









 CBC Auto Diff  2014  White Blood Count  7.5 10^3/uL    4.8-10.8  









 Red Blood Count  4.07 10^6/uL    4.0-5.4  

 

 Hemoglobin  12.9 g/dL    12.0-16.0  

 

 Hematocrit  38 %    35-47  

 

 Mean Corpuscular Volume  94 fL    80-97  

 

 Mean Corpuscular Hemoglobin  32 pg  High  27-31  

 

 Mean Corpuscular HGB Conc  34 g/dL    31-36  

 

 Red Cell Distribution Width  14 %    10.5-15  

 

 Platelet Count  338 10^3/uL    150-450  

 

 Mean Platelet Volume  7 um3  Low  7.4-10.4  

 

 Abs Neutrophils  5.0 10^3/uL    1.5-7.7  

 

 Abs Lymphocytes  1.7 10^3/uL    1.0-4.8  

 

 Abs Monocytes  0.6 10^3/uL    0-0.8  

 

 Abs Eosinophils  0.2 10^3/uL    0-0.6  

 

 Abs Basophils  0.1 10^3/uL    0-0.2  

 

 Abs Nucleated RBC  0 10^3/uL      

 

 Granulocyte %  66.3 %    38-83  

 

 Lymphocyte %  23.3 %  Low  25-47  

 

 Monocyte %  7.5 %    1-9  

 

 Eosinophil %  2.2 %    0-6  

 

 Basophil %  0.7 %    0-2  

 

 Nucleated Red Blood Cells %  0      









 Laboratory test finding  2014  Hemoglobin A1c  6.7    5-7  

 

 Lipid Profile (Trig/Chol/HDL)  2014  Triglycerides  357 mg/dL      53, 54









 Cholesterol  217 mg/dL      53, 55

 

 HDL Cholesterol  42.4 mg/dL      53, 56

 

 LDL Cholesterol  103 mg/dL      53, 57









 Laboratory test  2014  TSH (Thyroid  2.11 IU/mL    0.34-5.60  53, 58



 finding    Stimulating Horm)        

 

 Laboratory test  2014  Hemoglobin A1c  6.9    5-7  



 finding            

 

 Laboratory test  2014  Activated Partial  28.5 seconds    24.0-36.1  



 finding    Thrombo Time        

 

 Inr/Protime  2014  Inr  0.91    0.85-1.06  

 

 Comp Metabolic Panel  2014  Sodium  136 mmol/L    133-145  









 Potassium  4.3 mmol/L    3.7-5.6  

 

 Chloride  100 mmol/L  Low  101-111  

 

 Co2 Carbon Dioxide  28 mmol/L    22-32  

 

 Anion Gap  8 mmol/L    2-11  

 

 Glucose  151 mg/dL  High    

 

 Blood Urea Nitrogen  12 mg/dL    6-24  

 

 Creatinine  0.73 mg/dL    0.51-0.95  

 

 BUN/Creatinine Ratio  16.4    8-20  

 

 Calcium  9.1 mg/dL    8.6-10.3  

 

 Total Protein  6.8 g/dL    6.4-8.9  

 

 Albumin  3.9 g/dL    3.2-5.2  

 

 Globulin  2.9 g/dL    2-4  

 

 Albumin/Globulin Ratio  1.3    1-3  

 

 Total Bilirubin  0.70 mg/dL    0.2-1.0  

 

 Alkaline Phosphatase  76 U/L      

 

 Alt  20 U/L    7-52  

 

 Ast  16 U/L    13-39  

 

 Egfr Non-  82.8    >60  

 

 Egfr   106.4    >60  59









 CBC Auto Diff  2014  White Blood Count  9.5 10^3/uL    4.8-10.8  









 Red Blood Count  4.20 10^6/uL    4.0-5.4  

 

 Hemoglobin  12.7 g/dL    12.0-16.0  

 

 Hematocrit  39 %    35-47  

 

 Mean Corpuscular Volume  93 fL    80-97  

 

 Mean Corpuscular Hemoglobin  30 pg    27-31  

 

 Mean Corpuscular HGB Conc  33 g/dL    31-36  

 

 Red Cell Distribution Width  14 %    10.5-15  

 

 Platelet Count  328 10^3/uL    150-450  

 

 Mean Platelet Volume  8 um3    7.4-10.4  

 

 Abs Neutrophils  6.1 10^3/uL    1.5-7.7  

 

 Abs Lymphocytes  2.6 10^3/uL    1.0-4.8  

 

 Abs Monocytes  0.6 10^3/uL    0-0.8  

 

 Abs Eosinophils  0.2 10^3/uL    0-0.6  

 

 Abs Basophils  0.1 10^3/uL    0-0.2  

 

 Abs Nucleated RBC  0.01 10^3/uL      

 

 Granulocyte %  64.0 %    38-83  

 

 Lymphocyte %  27.1 %    25-47  

 

 Monocyte %  6.3 %    1-9  

 

 Eosinophil %  1.7 %    0-6  

 

 Basophil %  0.9 %    0-2  

 

 Nucleated Red Blood Cells %  0.1      









 Rapid Influenza A B  2014  Rapid Influenza A   B  (SEE NOTE)      60



 Antigen    Antigen        

 

 Laboratory test finding  2014  Lactic Acid  1.2 mmol/L    0.5-2.2  









 Blood Culture  (SEE NOTE)      61









 Blood Culture  2014  Blood Culture  (SEE NOTE)      62

 

 Urine Culture And Sensitivities  2014  Urine Culture  (SEE NOTE)      63

 

 Urinalysis  2014  Urine Color  Yellow      









 Urine Appearance  Clear      

 

 Urine Specific Gravity  1.005  Low  1.010-1.030  

 

 Urine Esterase  Negative    Negative  

 

 Urine Nitrate  Negative    Negative  

 

 Urine Urobilinogen  Negative E.U./dL    Negative  

 

 Urine Protein  Negative mg/dL    Negative  

 

 Urine pH  5.5    5-9  

 

 Urine Blood  Negative    Negative  

 

 Urine Ketones  Negative mg/dL    Negative  

 

 Urine Bilirubin  Negative    Negative  

 

 Urine Glucose  Negative mg/dL    Negative  









 Lipid Profile (Trig/Chol/HDL)  2013  Triglycerides  751 mg/dL  High  40-
200  









 Cholesterol  271 mg/dL  High  Less than 200  

 

 HDL Cholesterol  50 mg/dL    40-60  64

 

 Cholesterol/HDL Ratio  5.4 Average  High  1-4.44  

 

 LDL Cholesterol  (SEE NOTE)    Less Than 100  65









 Laboratory test finding  2013  TSH (Thyroid  3.58 miu/mL    0.34-5.60  66



     Stimulating Horm)        

 

 Urine Microalbumin  2013  Ur Microalbumin (mg/L)  23.0 mg/L      67



 Random            









 Urine Creatinine  160.2 mg/dL      

 

 Urine Microalbumin/Creatinine  14.4    Less Than 31  









 Comp Metabolic Panel  2013  Sodium  137 mmol/L    133-145  









 Potassium  4.3 mmol/L    3.5-5.0  

 

 Chloride  102 mmol/L    101-111  

 

 Co2 Carbon Dioxide  27.0 mmol/L    22-32  

 

 Anion Gap  8.0 mmol/L    2-11  

 

 Glucose  169 mg/dL  High    

 

 Blood Urea Nitrogen  9 mg/dL    6-24  

 

 Creatinine  0.70 mg/dL    0.50-1.40  

 

 BUN/Creatinine Ratio  12.9    8-20  

 

 Calcium  9.0 mg/dL    8.1-9.9  

 

 Total Protein  6.2 g/dL    6.2-8.1  

 

 Albumin  3.6 g/dL    3.6-5.4  

 

 Globulin  2.6 g/dL    2-4  

 

 Albumin/Globulin Ratio  1.4    1-3  

 

 Total Bilirubin  0.8 mg/dL    0.4-1.5  

 

 Alkaline Phosphatase  61 U/L      

 

 Alt  40 U/L    14-54  

 

 Ast  32 U/L    12-42  

 

 Egfr Non-  86.9    >60  

 

 Egfr   111.7    >60  68









 Laboratory test finding  2013  Hemoglobin A1c  7.2  High  5-7  

 

 Laboratory test finding  10/03/2010  Hemoglobin A1c  5.8 %    Less Than 6.0  69
, 70









 1  : [id number]

 

 2  SEE RESULT BELOW



   -----------------------------------------------------------------------------
---------------



   Name:  YSABEL VAZQUEZ                    : 1958    Attend Dr: Ruddy Herzog MD



   Acct:  [id number]  Unit: [id number]  AGE: 59            Location:  [facility]



   Re17                        SEX: F             Status:    REG ER



   -----------------------------------------------------------------------------
---------------



   



   SPEC: [id number]         ALLIE:   17-    [facility] DR: Ruddy Herzog MD



   REQ:  [id number]              RECD:   



   STATUS: COMP             [facility] DR: Sushma Hameed MD



   _



   SOURCE: QUITA WATSONESC:



   ORDERED:  Flu A B Request



   



   -----------------------------------------------------------------------------
---------------



   Procedure                         Result                         Reported   
        Site



   -----------------------------------------------------------------------------
---------------



   Rapid Influenza A   B Request  Final                             17-
1549      ML



   Specimen received for Influenza A/B Molecular testing



   



   -----------------------------------------------------------------------------
---------------



   * ML - MAIN LAB ([facility])



   .



   



   



   



   



   



   



   



   



   



   



   



   



   



   



   



   



   



   



   



   



   



   



   



   



   



   



   



   ** END OF REPORT **



   



   * ML=Testing performed at Main Lab



   DEPARTMENT OF PATHOLOGY,  [address] [place] [address]



   Phone # [phone]      Fax #[phone]



   Elvis Mccray M.D. Director     [facility] # [id number]

 

 3  FASTING

 

 4  Desirable <150



   Borderline high 150-199



   High 200-499



   Very High >500

 

 5  Desirable <200



   Borderline high 200-239



   High >239

 

 6  Low <40



   Desirable: 40-60



   High: >60

 

 7  Desirable: <100 mg/dL



   Near Optimal: 100-129 mg/dL



   Borderline High: 130-159 mg/dL



   High: 160-189 mg/dL



   Very High: >189 mg/dL

 

 8  Therapeutic target for the treatment of diabetes



   [place] patients is <7% HBA1C, and in selective



   patients <6.0%.Please refer to American Diabetes



   Association Diabetic care guidelines for further



   information.

 

 9  Unable to calculate due to low microalbumin

 

 10  Desirable <150



   Borderline high 150-199



   High 200-499



   Very High >500

 

 11  Desirable <200



   Borderline high 200-239



   High >239

 

 12  Low <40



   Desirable: 40-60



   High: >60

 

 13  Desirable: <100 mg/dL



   Near Optimal: 100-129 mg/dL



   Borderline High: 130-159 mg/dL



   High: 160-189 mg/dL



   Very High: >189 mg/dL

 

 14  Therapeutic target for the treatment of diabetes



   [place] patients is <7% HBA1C, and in selective



   patients <6.0%.Please refer to American Diabetes



   Association Diabetic care guidelines for further



   information.

 

 15  **Please note:



   The following may produce a false positive D Dimer test:



   - Rheumatoid factor greater than 60 IU/ml



   - Plasma hemoglobin greater than 0.05 gm/dl



   - Bilirubin greater than 50 mg/dl



   - Lipids greater than 1000 mg/dl



   - FDP greater than 20 ug/ml

 

 16  [place] Severe Sepsis and Septic Shock Management Bundle Measure



   requires all lactic acids initially measuring >2.0 mmol/L be



   repeated.

 

 17  Reference Range and Interpretation:



   TnI (ng/mL)             Interpretation



   Less Than 0.03 ng/mL    Not supportive of diagnosis of MI



   0.03 - 0.50 ng/mL       Indeterminate: suggest serial



   studies if clinically indicated.



   Greater than 0.5 ng/mL  Consistent with diagnosis of MI

 

 18  *******Because ethnic data is not always readily available,



   this report includes an eGFR for both -Americans and



   non- Americans.****



   The National Kidney Disease Education Program (NKDEP) does



   not endorse the use of the MDRD equation for patients that



   are not between the ages of 18 and 70, are pregnant, have



   extremes of body size, muscle mass, or nutritional status,



   or are non- or non-.



   According to the National Kidney Foundation, irrespective of



   diagnosis, the stage of the disease is based on the level of



   kidney function:



   Stage Description                      GFR(mL/min/1.73 m(2))



   1     Kidney damage with normal or decreased GFR       90



   2     Kidney damage with mild decrease in GFR          60-89



   3     Moderate decrease in GFR                         30-59



   4     Severe decrease in GFR                           15-29



   5     Kidney failure                       <15 (or dialysis)

 

 19  Acute inflammation:  >10.00

 

 20  [id number]

 

 21  The high-risk HPV types detected by the assay include: 16,



   18, 31, 33, 35, 39, 45, 51, 52, 56, 58, 59, 66, and 68.

 

 22  SEE RESULT BELOW



   -----------------------------------------------------------------------------
---------------



   Name:  YSABEL VAZQUEZ THIERRY                    : 1958    Attend Dr: Reji Garza MD



   Acct:  [id number]  Unit: [id number]  AGE: 57            Location:  [facility]



   Re16                        SEX: F             Status:    REG REF



   -----------------------------------------------------------------------------
---------------



   



   SPEC: [id number]            ALLIE: 30      [facility] DR: Reji Garza MD



   REQ:  [id number]             RECD: 



   STATUS: GUILLERMINA HUGGINS DR: Alexander Hameed MD



   _



   ORDERED:  IMAGE ANALYSIS, HPV/Thin Prep, HPV 16/18 GENE



   COMMENTS: [id number]



   



   FINAL DIAGNOSIS



   



   Negative for Intraepithelial lesion or Malignancy



   A. Vaginal



   Specimen Adequacy:



   Satisfactory of evaluation



   Patient Information:



   HPV: High risk HPV RNA testing regardless of pap results.



   HPV 16/18 Genotype Reflex



   Actual Specimen Date:         16



   LMP If Unknown:               



   Pregnant?:     N



   Post Menopausal?:             Y



   Hysterectomy?:                Y



   Previous Abnormal Pap Smears?:N



   Other Pertinent History:      Hysterectomy for complex atypical 
hyperplasia.



   Vaginal cuff post radiation.



   



   -----------------------------------------------------------------------------
---------------



   Date     Time Test            Result   Flag (u) Normal Range



   16 0930 HPV RNA RFLX GE Negative          Negative



   



   The high-risk HPV types detected by the assay include: 16,



   18, 31, 33, 35, 39, 45, 51, 52, 56, 58, 59, 66, and 68.



   -----------------------------------------------------------------------------
---------------



   



   



   Signed __________(signature on file)___________ SIENA Galarza (ASC)  0945



   



   This Pap test was evaluated with the assistance of the [employer] Test Imaging 
System. Due to



   cytologic findings at the imager microscope, comprehensive manual 
rescreening by a



   Cytotechnologist may be required.



   The Pap Smear is a screening test designed to aid in the detection of 
premalignant and



   malignant conditions of the uterine cervix.  It is not a diagnostic 
procedure and should



   not be used as the sole means of detecting cervical cancer.  Both false-
positive and false-



   negative reports do occur.  Depending on your risk status, a Pap smear 
should be obtained



   and evaluated every 1-3 years.



   



   -----------------------------------------------------------------------------
---------------



   



   ** END OF REPORT **



   



   * ML=Testing performed at Main Lab



   DEPARTMENT OF PATHOLOGY,  [address] [place] [id number]



   



   



   RUN DATE: 16               [facility] **LIVE**         
       PAGE    1



   



   -----------------------------------------------------------------------------
---------------



   Patient: YSABEL VAZQUEZ                     [id number]     (Continued)



   -----------------------------------------------------------------------------
---------------



   



   Phone # [phone]      Fax #[phone]



   Elvis Mccray M.D. Director     [facility] # [id number]

 

 23  **Please note:



   The following may produce a false positive D Dimer test:



   - Rheumatoid factor greater than 60 IU/ml



   - Plasma hemoglobin greater than 0.05 gm/dl



   - Bilirubin greater than 50 mg/dl



   - Lipids greater than 1000 mg/dl



   - FDP greater than 20 ug/ml

 

 24  *******Because ethnic data is not always readily available,



   this report includes an eGFR for both -Americans and



   non- Americans.****



   The National Kidney Disease Education Program (NKDEP) does



   not endorse the use of the MDRD equation for patients that



   are not between the ages of 18 and 70, are pregnant, have



   extremes of body size, muscle mass, or nutritional status,



   or are non- or non-.



   According to the National Kidney Foundation, irrespective of



   diagnosis, the stage of the disease is based on the level of



   kidney function:



   Stage Description                      GFR(mL/min/1.73 m(2))



   1     Kidney damage with normal or decreased GFR       90



   2     Kidney damage with mild decrease in GFR          60-89



   3     Moderate decrease in GFR                         30-59



   4     Severe decrease in GFR                           15-29



   5     Kidney failure                       <15 (or dialysis)

 

 25  Reference Range and Interpretation:



   TnI (ng/mL)             Interpretation



   Less Than 0.03 ng/mL    Not supportive of diagnosis of MI



   0.03 - 0.50 ng/mL       Indeterminate: suggest serial



   studies if clinically indicated.



   Greater than 0.5 ng/mL  Consistent with diagnosis of MI

 

 26  *******Because ethnic data is not always readily available,



   this report includes an eGFR for both -Americans and



   non- Americans.****



   The National Kidney Disease Education Program (NKDEP) does



   not endorse the use of the MDRD equation for patients that



   are not between the ages of 18 and 70, are pregnant, have



   extremes of body size, muscle mass, or nutritional status,



   or are non- or non-.



   According to the National Kidney Foundation, irrespective of



   diagnosis, the stage of the disease is based on the level of



   kidney function:



   Stage Description                      GFR(mL/min/1.73 m(2))



   1     Kidney damage with normal or decreased GFR       90



   2     Kidney damage with mild decrease in GFR          60-89



   3     Moderate decrease in GFR                         30-59



   4     Severe decrease in GFR                           15-29



   5     Kidney failure                       <15 (or dialysis)

 

 27  [facility] Severe Sepsis and Septic Shock Management Bundle Measure



   requires all lactic acids initially measuring >2.0mmol/L be



   repeated.

 

 28  Effective immediately, due to a laboratory mean normal



   Protime change, the reference range for the INR has changed.

 

 29  *******Because ethnic data is not always readily available,



   this report includes an eGFR for both -Americans and



   non- Americans.****



   The National Kidney Disease Education Program (NKDEP) does



   not endorse the use of the MDRD equation for patients that



   are not between the ages of 18 and 70, are pregnant, have



   extremes of body size, muscle mass, or nutritional status,



   or are non- or non-.



   According to the National Kidney Foundation, irrespective of



   diagnosis, the stage of the disease is based on the level of



   kidney function:



   Stage Description                      GFR(mL/min/1.73 m(2))



   1     Kidney damage with normal or decreased GFR       90



   2     Kidney damage with mild decrease in GFR          60-89



   3     Moderate decrease in GFR                         30-59



   4     Severe decrease in GFR                           15-29



   5     Kidney failure                       <15 (or dialysis)

 

 30  FASTING 12 HOUR

 

 31  Desirable <150



   Borderline high 150-199



   High 200-499



   Very High >500

 

 32  Desirable <200



   Borderline high 200-239



   High >239

 

 33  Low <40



   Desirable: 40-60



   High: >60

 

 34  Desirable: <100 mg/dL



   Near Optimal: 100-129 mg/dL



   Borderline High: 130-159 mg/dL



   High: 160-189 mg/dL



   Very High: >189 mg/dL

 

 35  FASTING 12 HOUR

 

 36  FASTING 12 HOUR

 

 37  FASTING 12 HOUR

 

 38  *******Because ethnic data is not always readily available,



   this report includes an eGFR for both -Americans and



   non- Americans.****



   The National Kidney Disease Education Program (NKDEP) does



   not endorse the use of the MDRD equation for patients that



   are not between the ages of 18 and 70, are pregnant, have



   extremes of body size, muscle mass, or nutritional status,



   or are non- or non-.



   According to the National Kidney Foundation, irrespective of



   diagnosis, the stage of the disease is based on the level of



   kidney function:



   Stage Description                      GFR(mL/min/1.73 m(2))



   1     Kidney damage with normal or decreased GFR       90



   2     Kidney damage with mild decrease in GFR          60-89



   3     Moderate decrease in GFR                         30-59



   4     Severe decrease in GFR                           15-29



   5     Kidney failure                       <15 (or dialysis)

 

 39  Acute inflammation:  >10.00

 

 40  RUN DATE: 02/08/15               [facility] **LIVE**       
         PAGE    1



   RUN TIME: 831             [address] [place] [address]   [address]



   Specimen Inquiry



   



   -----------------------------------------------------------------------------
---------------



   Name:  YSABEL VAZQUEZ                    : 1958    Attend Dr: Ruddy Moser MD



   Acct:  [id number]  Unit: [id number]  AGE: 56            [facility]  [facility]



   Re/03/15                        SEX: F             Status:    DEP ER



   -----------------------------------------------------------------------------
---------------



   



   SPEC: [id number]         ALLIE:   02/03/    [facility] DR: Ashley BALDWIN



   REQ:  [id number]              RECD:   02/03/



   STATUS: COMP             OTHR DR: Sushma Moser MD



   _



   SOURCE: BLOOD,VENO     SPDES:



   ORDERED:  Blood Cult



   



   -----------------------------------------------------------------------------
---------------



   Procedure                         Result                         Verified   
        Site



   -----------------------------------------------------------------------------
---------------



   Aerobic Culture Bottle  Final                                    02/08/15-
0831      ML



   No Growth Day 5



   



   Anaerobic Culture Bottle  Final                                  02/08/15-
0831      ML



   No Growth Day 5



   



   -----------------------------------------------------------------------------
---------------



   



   



   



   



   



   



   



   



   



   



   



   



   



   



   



   



   



   



   



   



   



   



   



   



   



   ** END OF REPORT **



   



   * ML=Testing performed at Main Lab



   DEPARTMENT OF PATHOLOGY,  [address] [place] [address]



   Phone # [phone]      Fax #[phone]



   Elvis Mccray M.D. Director     [facility] # [id number]

 

 41  *******Because ethnic data is not always readily available,



   this report includes an eGFR for both -Americans and



   non- Americans.****



   The National Kidney Disease Education Program (NKDEP) does



   not endorse the use of the MDRD equation for patients that



   are not between the ages of 18 and 70, are pregnant, have



   extremes of body size, muscle mass, or nutritional status,



   or are non- or non-.



   According to the National Kidney Foundation, irrespective of



   diagnosis, the stage of the disease is based on the level of



   kidney function:



   Stage Description                      GFR(mL/min/1.73 m(2))



   1     Kidney damage with normal or decreased GFR       90



   2     Kidney damage with mild decrease in GFR          60-89



   3     Moderate decrease in GFR                         30-59



   4     Severe decrease in GFR                           15-29



   5     Kidney failure                       <15 (or dialysis)

 

 42  RUN DATE: 02/08/15               [facility] **LIVE**       
         PAGE    1



   RUN TIME: 825             [address] [place] [address]   [address]



   Specimen Inquiry



   



   -----------------------------------------------------------------------------
---------------



   Name:  YSABEL VAZQUEZ                    : 1958    Attend Dr: Ruddy Moser MD



   Acct:  [id number]  Unit: [id number]  AGE: 56            [facility]:  [facility]



   Re/03/15                        SEX: F             Status:    DEP ER



   -----------------------------------------------------------------------------
---------------



   



   SPEC: [id number]         ALLIE:   02/03/    SO[facility] MENDIETA: Ashley BALDWIN



   REQ:  [id number]              RECD:   02/03/



   STATUS: COMP             OTHR DR: Sushma Moser MD



   _



   SOURCE: BLOOD,VENO     SPDES:



   ORDERED:  Blood Cult



   



   -----------------------------------------------------------------------------
---------------



   Procedure                         Result                         Verified   
        Site



   -----------------------------------------------------------------------------
---------------



   Aerobic Culture Bottle  Final                                    02/08/15-
0825      ML



   No Growth Day 5



   



   Anaerobic Culture Bottle  Final                                  02/08/15-
25      ML



   No Growth Day 5



   



   -----------------------------------------------------------------------------
---------------



   



   



   



   



   



   



   



   



   



   



   



   



   



   



   



   



   



   



   



   



   



   



   



   



   



   ** END OF REPORT **



   



   * ML=Testing performed at Main Lab



   DEPARTMENT OF PATHOLOGY,  [facility] [employer] [place], [place] [address]



   Phone # [phone]      Fax #[phone]



   Elvis Mccray M.D. Director     [place] # [id number]

 

 43  RUN DATE: 02/03/15               [facility] **LIVE**       
         PAGE    1



   RUN TIME: 829             [facility] [employer] [place], [place]   [address]



   Specimen Inquiry



   



   -----------------------------------------------------------------------------
---------------



   Name:  YSABEL VAZQUEZ                    : 1958    Attend Dr: Ruddy Moser MD



   Acct:  [id number]  Unit: [id number]  AGE: 56            Location:  [facility]



   Re/03/15                        SEX: F             Status:    REG ER



   -----------------------------------------------------------------------------
---------------



   



   SPEC: [id number]         ALLIE:   02/03/    [facility] DR: Ashley BALDWIN



   REQ:  [id number]              RECD:   02/03/



   STATUS: COMP             OTHR DR: Sushma Moser MD



   _



   [facility] QUITA     [facility]



   ORDERED:  Rapid Flu A B



   



   -----------------------------------------------------------------------------
---------------



   Procedure                         Result                         Verified   
        Site



   -----------------------------------------------------------------------------
---------------



   Rapid Influenza A   B Antigen  Final                             02/03/15-
08      ML



   



   Organism 1                     Negative Influenza A B



   



   Antigen testing by enzyme immunoassay.



   



   Cell culture testing can be performed to confirm



   negative test results and to assist in detecting other



   viruses that can produce similar clinical symptoms.



   Please notify Microbiology Lab if further testing is



   desired.



   



   -----------------------------------------------------------------------------
---------------



   



   



   



   



   



   



   



   



   



   



   



   



   



   



   



   



   



   



   



   ** END OF REPORT **



   



   * ML=Testing performed at Main Lab



   DEPARTMENT OF PATHOLOGY,  [address] [place] [address]



   Phone # [phone]      Fax #[phone]



   Elvis Mccray M.D. Director     [facility] # [id number]

 

 44  *******Because ethnic data is not always readily available,



   this report includes an eGFR for both -Americans and



   non- Americans.****



   The National Kidney Disease Education Program (NKDEP) does



   not endorse the use of the MDRD equation for patients that



   are not between the ages of 18 and 70, are pregnant, have



   extremes of body size, muscle mass, or nutritional status,



   or are non- or non-.



   According to the National Kidney Foundation, irrespective of



   diagnosis, the stage of the disease is based on the level of



   kidney function:



   Stage Description                      GFR(mL/min/1.73 m(2))



   1     Kidney damage with normal or decreased GFR       90



   2     Kidney damage with mild decrease in GFR          60-89



   3     Moderate decrease in GFR                         30-59



   4     Severe decrease in GFR                           15-29



   5     Kidney failure                       <15 (or dialysis)

 

 45  *******Because ethnic data is not always readily available,



   this report includes an eGFR for both -Americans and



   non- Americans.****



   The National Kidney Disease Education Program (NKDEP) does



   not endorse the use of the MDRD equation for patients that



   are not between the ages of 18 and 70, are pregnant, have



   extremes of body size, muscle mass, or nutritional status,



   or are non- or non-.



   According to the National Kidney Foundation, irrespective of



   diagnosis, the stage of the disease is based on the level of



   kidney function:



   Stage Description                      GFR(mL/min/1.73 m(2))



   1     Kidney damage with normal or decreased GFR       90



   2     Kidney damage with mild decrease in GFR          60-89



   3     Moderate decrease in GFR                         30-59



   4     Severe decrease in GFR                           15-29



   5     Kidney failure                       <15 (or dialysis)

 

 46  Verbal to [id number] by [id number] at 1540 on 10/09/14.~Results read back 
accurately.

 

 47  Reference ranges based on room air.

 

 48  Desirable <150



   Borderline high 150-199



   High 200-499



   Very High >500

 

 49  Desirable <200



   Borderline high 200-239



   High >239

 

 50  Low <40



   Desirable: 40-60



   High: >60

 

 51  Desirable <100



   Near Optimal 100-129



   Borderline high 130-159



   High 160-189



   Very High >189

 

 52  This assay does not differentiate between reactivity due to



   a vaccine-induced immune response or an immune response



   induced by infection with HBV.

 

 53  FASTING

 

 54  Desirable <150



   Borderline high 150-199



   High 200-499



   Very High >500

 

 55  Desirable <200



   Borderline high 200-239



   High >239

 

 56  Low <40



   Desirable: 40-60



   High: >60

 

 57  Desirable <100



   Near Optimal 100-129



   Borderline high 130-159



   High 160-189



   Very High >189

 

 58  FASTING

 

 59  *******Because ethnic data is not always readily available,



   this report includes an eGFR for both -Americans and



   non- Americans.****



   The National Kidney Disease Education Program (NKDEP) does



   not endorse the use of the MDRD equation for patients that



   are not between the ages of 18 and 70, are pregnant, have



   extremes of body size, muscle mass, or nutritional status,



   or are non- or non-.



   According to the National Kidney Foundation, irrespective of



   diagnosis, the stage of the disease is based on the level of



   kidney function:



   Stage Description                      GFR(mL/min/1.73 m(2))



   1     Kidney damage with normal or decreased GFR       90



   2     Kidney damage with mild decrease in GFR          60-89



   3     Moderate decrease in GFR                         30-59



   4     Severe decrease in GFR                           15-29



   5     Kidney failure                       <15 (or dialysis)

 

 60  RUN DATE: 14               [facility] **LIVE**       
         PAGE    1



   RUN TIME: 0750             [address] [place] [address]   [address]



   Specimen Inquiry



   



   -----------------------------------------------------------------------------
---------------



   Name:  YSABEL VAZQUEZ                    : 1958    Attend Dr: Myke Mazariegos MD



   Acct:  [id number]  Unit: [id number]  AGE: 55            Location:  [facility]



   Re14                        SEX: F             Status:    REG ER



   -----------------------------------------------------------------------------
---------------



   



   SPEC: [id number]         ALLIE:       [facility] DR: Brad Huggins MD



   REQ:  [id number]              RECD:   



   STATUS: COMP             OTHR DR: [facility]



   Sushma Hameed MD



   _



   SOURCE: QUITA     SPDESC:



   ORDERED:  Rapid Flu A B



   



   -----------------------------------------------------------------------------
---------------



   Procedure                         Result                         Verified   
        Site



   -----------------------------------------------------------------------------
---------------



   Rapid Influenza A   B Antigen  Final                             14-
0750      ML



   



   Organism 1                     Negative Influenza A B



   



   Antigen testing by enzyme immunoassay.



   



   Cell culture testing can be performed to confirm



   negative test results and to assist in detecting other



   viruses that can produce similar clinical symptoms.



   Please notify Microbiology Lab if further testing is



   desired.



   



   -----------------------------------------------------------------------------
---------------



   



   



   



   



   



   



   



   



   



   



   



   



   



   



   



   



   



   



   



   ** END OF REPORT **



   



   * ML=Testing performed at Main Lab



   DEPARTMENT OF PATHOLOGY,  [facility] [employer] [place], [place] [address]



   Phone # [phone]      Fax #[phone]



   Elvis Mccray M.D. Director     [place] [employer]  #[id number]

 

 61  RUN DATE: 14               [facility] **LIVE**       
         PAGE    1



   RUN TIME: 0850             [address] [place] [address]   [address]



   Specimen Inquiry



   



   -----------------------------------------------------------------------------
---------------



   Name:  YSABEL VAZQUEZ                    : 1958    Attend Dr: Myke Mazariegos MD



   Acct:  [id number]  Unit: [id number]  AGE: 55            Location:  [facility]



   Re14                        SEX: F             Status:    DEP ER



   -----------------------------------------------------------------------------
---------------



   



   SPEC: [id number]         ALLIE:       [facility] DR: Myke Mazariegos MD



   REQ:  [id number]              RECD:   



   STATUS: COMP             OTHR DR: Sushma Hameed MD



   _



   SOURCE: BLOOD,VENO     SPDESC:



   ORDERED:  Blood Cult



   



   -----------------------------------------------------------------------------
---------------



   Procedure                         Result                         Verified   
        Site



   -----------------------------------------------------------------------------
---------------



   Aerobic Culture Bottle  Final                                    14-
0850      ML



   No Growth Day 5



   



   Anaerobic Culture Bottle  Final                                  14-
0850      ML



   No Growth Day 5



   



   -----------------------------------------------------------------------------
---------------



   



   



   



   



   



   



   



   



   



   



   



   



   



   



   



   



   



   



   



   



   



   



   



   



   



   



   ** END OF REPORT **



   



   * ML=Testing performed at Main Lab



   DEPARTMENT OF PATHOLOGY,  [address] [place] [address]



   Phone # [phone]      Fax #[phone]



   Elvis Mccray M.D. Director     [place] Permit  #[id number]

 

 62  RUN DATE: 14               [facility] **LIVE**       
         PAGE    1



   RUN TIME: 0850             [address] [place] [address]   [id number]



   Specimen Inquiry



   



   -----------------------------------------------------------------------------
---------------



   Name:  YSABEL VAZQUEZ                    : 1958    Attend Dr: Myke Mazariegos MD



   Acct:  [id number]  Unit: [id number]  AGE: 55            Location:  ED



   Re14                        SEX: F             Status:    DEP ER



   -----------------------------------------------------------------------------
---------------



   



   SPEC: [id number]         ALLIE:       [facility] DR: Myke Mazariegos MD



   REQ:  [id number]              RECD:   



   STATUS: COMP             OTHR DR: Sushma Hameed MD



   _



   SOURCE: BLOOD,VENO     SPDESC:



   ORDERED:  Blood Cult



   



   -----------------------------------------------------------------------------
---------------



   Procedure                         Result                         Verified   
        Site



   -----------------------------------------------------------------------------
---------------



   Aerobic Culture Bottle  Final                                    14-
0850      ML



   No Growth Day 5



   



   Anaerobic Culture Bottle  Final                                  14-
0850      ML



   No Growth Day 5



   



   -----------------------------------------------------------------------------
---------------



   



   



   



   



   



   



   



   



   



   



   



   



   



   



   



   



   



   



   



   



   



   



   



   



   



   



   ** END OF REPORT **



   



   * ML=Testing performed at Main Lab



   DEPARTMENT OF PATHOLOGY,  [facility] [employer] [place], [place] [address]



   Phone # [phone]      Fax #[phone]



   Elvis Mccray M.D. Director     [place] Permit  #[id number]

 

 63  RUN DATE: 14               [facility] **LIVE**       
         PAGE    1



   RUN TIME: 101             [facility] [employer] [place], [place]   [address]



   Specimen Inquiry



   



   -----------------------------------------------------------------------------
---------------



   Name:  YSABEL VAZQUEZ                    : 1958    Attend Dr: Myke Mazariegos MD



   Acct:  [id number]  Unit: [id number]  AGE: 55            Location:  ED



   Re14                        SEX: F             Status:    DEP ER



   -----------------------------------------------------------------------------
---------------



   



   SPEC: [id number]         ALLIE:       [facility] DR: Myke Mazariegos MD



   REQ:  [id number]              RECD:   



   STATUS: COMP             OTHR DR: Sushma Hameed MD



   _



   SOURCE: URINE          SPDESC:



   ORDERED:  Urine Culture



   



   -----------------------------------------------------------------------------
---------------



   Procedure                         Result                         Verified   
        Site



   -----------------------------------------------------------------------------
---------------



   Urine Culture  Final                                             14-
1011      ML



   



   Mixed sugar; possible contamination. Suggest



   resubmission.



   



   



   -----------------------------------------------------------------------------
---------------



   



   



   



   



   



   



   



   



   



   



   



   



   



   



   



   



   



   



   



   



   



   



   



   



   



   



   ** END OF REPORT **



   



   * ML=Testing performed at Main Lab



   DEPARTMENT OF PATHOLOGY,  [address] [place] [address]



   Phone # [phone]      Fax #[phone]



   Elvis Mccray M.D. Director     [place] Permit  #[id number]

 

 64  HDL Interpretation:



   Undesirable: High Risk:  Less than 40 mg/dL



   Desirable:  Low Risk:  Greater than 60 mg/dL

 

 65  Unable to calculate LDL as triglyceride is > 400

 

 66  FASTING

 

 67  Microalbuminuria in a random sample is defined as:



   Microalbumin/Creatinine ratio of  ug/mg.

 

 68  *******Because ethnic data is not always readily available,



   this report includes an eGFR for both -Americans and



   non- Americans.****



   The National Kidney Disease Education Program (NKDEP) does



   not endorse the use of the MDRD equation for patients that



   are not between the ages of 18 and 70, are pregnant, have



   extremes of body size, muscle mass, or nutritional status,



   or are non- or non-.



   According to the National Kidney Foundation, irrespective of



   diagnosis, the stage of the disease is based on the level of



   kidney function:



   Stage Description                      GFR(mL/min/1.73 m(2))



   1     Kidney damage with normal or decreased GFR       90



   2     Kidney damage with mild decrease in GFR          60-89



   3     Moderate decrease in GFR                         30-59



   4     Severe decrease in GFR                           15-29



   5     Kidney failure                       <15 (or dialysis)

 

 69  COMMENTS: N

 

 70  THERAPEUTIC TARGET FOR THE TREATMENT OF DIABETES



   MELLITUS PATIENTS IS <7% HBA1C, AND IN SELECTIVE



   PATIENTS <6.0%.  PLEASE REFER TO AMERICAN DIABETES



   ASSOCIATION DIABETIC CARE GUIDELINES FOR FURTHER



   INFORMATION.







Procedures







 Date  CPT Code  Description  Status  Comment

 

 2017  02524  EKG Tracing & Interpretation  Completed  

 

 2016  09296  EKG Tracing & Interpretation  Completed  

 

 2016  67161  Holter Monitoring 24 HR New  Completed  

 

 2016  66148  Holter Monitoring 24 HR New  Completed  

 

 2015    Diabetic Retinal Eye Exam  Completed  

 

 10/09/2014  97510  Spirometry Incl Graphic Record, Timed  Completed  



     Expiratory Flow Rate    

 

 10/06/2014  62321  EKG Tracing & Interpretation  Completed  

 

 2014    Mammogram  Completed  

 

 2014    Diabetic Retinal Eye Exam  Completed  

 

 10/29/2008    Colonoscopy  Completed  

 

 49    Colonoscopy  Completed  per pt ( nl)







Encounters







 Type  Date  Location  Provider  CPT E/M  Dx

 

 Office Visit  2018  [facility]  Lindsey Johnston MD  [address]  
J44.1



   9:15a  [facility]      









 G47.33

 

 E66.01

 

 K21.9









 Office Visit  2017  8:10a  [facility]  Sushma Hameed M.D.  
[address]  E11.9



     - [place]      









 E11.9

 

 J44.9

 

 J44.9

 

 D18.03

 

 R93.8

 

 Z91.19

 

 D18.03

 

 Z91.19









 [facility]  2017 [facility]48[facility]  [address]
  J44.1



     [facility]  [place]    



     Hospitalists      









 I10

 

 E78.5

 

 G47.33









 Office Visit  2017 11:47a  [facility]  Josy Naylor, N.P.  
[address]  J44.1



     Hospitalists      









 G47.33

 

 I10

 

 E78.5









 Office Visit  2017 11:46a  [facility]  Devin Edil,  
[address]  J44.1



     Hospitalists  N.P.    









 G47.33

 

 I10

 

 E78.5









 Office Visit  2017 10:10a  [facility]  Sushma Hameed M.D.  
[address]  E11.9



     - [place]      









 D18.03

 

 E78.2

 

 E03.8

 

 Z12.31

 

 F32.89









 Office Visit  2017 10:20a  [facility]  Silas Figueredo, DO  
[address]  I47.1



     [facility]  FACC    

 

 Office Visit  10/19/2016  2:15p  [facility]  Iván Lima MD,  
[address]  E66.01



     [facility]  FACS    









 R19.7









 Office Visit  10/18/2016 10:10a  [facility]  Sushma Hameed M.D.  
[address]  K52.9



     - [place]      









 E78.4

 

 E11.9

 

 Z23

 

 F43.20

 

 D18.03

 

 L60.3









 Office Visit  2016 10:00a  [facility]  Lindsey Johnston MD  
[address]  J45.909



     [facility]      









 G47.33

 

 E66.01









 Office Visit  2016 10:50a  [facility]  Sushma Hameed,  [address]
  L03.114



     - Jorge Luis HERRERA    









 F43.20









 Office Visit  03/15/2016 10:30a  [facility]  Sushma Hameed M.D.  
[address]  E11.9



     - Jorge Luis      









 C54.1

 

 E78.2

 

 F43.20









 Office Visit  2016  1:00p  [facility]  [place]  
[address]  R00.2



     [facility]  FAC    









 E11.9

 

 G47.33

 

 E03.8

 

 J44.9

 

 E66.8









 Office Visit  2015 11:50a  [facility]  Sushma Hameed M.D.  
[address]  R00.2



     - Jorge Luis      









 B34.9









 Office Visit  2015  9:50a  [facility]  Sushma Hameed  [address]
  250.00



     - Jorge Luis HERRERA    









 110.1

 

 V03.82

 

 V04.81

 

 453.82

 

 110.8

 

 300.00









 Office Visit  2015  9:00a  [facility]  Iván Newby M.D.  [address]
  493.90



     [facility]      









 327.23









 Office Visit  2015 10:50a  [facility]  Sushma Hameed  [address]
  787.02



     - Jorge Luis HERRERA    









 300.00

 

 453.82









 Office Visit  06/10/2015 11:50a  [facility]  Sushma Hameed  [address]148
  453.82



     - Jorge Luis HERRERA    









 729.82

 

 V45.89

 

 285.1

 

 250.00

 

 V58.61









 Office Visit  2015  9:30a  [facility] -  Christopher Salas NP  
[address]  372.00



     [address]      









 461.8

 

 461.9









 Office Visit  2015 10:10a  [facility]  Sushma Hameed  [address]
  250.00



     - Jorge Luis HERRERA    









 272.1

 

 691.8

 

 244.8

 

 288.60









 Office Visit  2014 10:00a  [facility]  Iván Newby M.D.  [address]
  493.00



     [facility]      









 327.23









 Office Visit  2014  1:50p  [facility]   Sushma Hameed  
[address]  682.8



     Jorge Luis HERRERA    

 

 Office Visit  2014  9:39a  [facility],  LuisF elipe Dyson  
[address]  486



     Hospitalists  MMANFRED    









 496

 

 038.9

 

 278.00









 Office Visit  2014  9:38a  [facility],  Luis Felipe Dyson  
[address]  486



     Hospitalists  MMANFRED    









 496

 

 038.9

 

 278.00









 Office Visit  10/31/2014  9:37a  [facility],  Devin Solo,  
[address]  486



     Hospitalists  N.[place]elley    









 496

 

 038.9

 

 278.00









 Office Visit  10/09/2014  1:30p  [facility]  Iván Newby M.D.  [address]
  327.23



     [facility]      









 493.00









 Office Visit  10/06/2014 12:10p  [facility]  Sushma Hameed  [address]
  V72.84



     - Jorge Luis HERRERA    









 627.0

 

 272.1

 

 250.00

 

 327.23

 

 244.8

 

 696.8

 

 V04.81

 

 278.01

 

 493.90









 Office Visit  2014 10:50a  [facility]  Sushma Hameed  [address]
  250.00



     - Jogre Luis HERRERA    









 493.90

 

 696.8

 

 703.8

 

 627.0

 

 272.1

 

 V03.82









 Office Visit  2014  1:10p  [facility]  Sushma Hameed M.D.  
[address]758  847.0



     - Jorge Luis      









 847.0









 Office Visit  2014  2:30p  [facility]  Sushma Hameed  [address]
  250.00



     - Jorge Luis HERRERA    









 272.1

 

 327.23

 

 244.8

 

 493.90









 Office Visit  2013 11:10a  [facility]  Sushma Hameed  [address]
  493.90



     - Jorge Luis HERRERA    









 477.9

 

 244.8

 

 250.02









 Office Visit  2013  9:15a  [facility]  Ijeoma Hurt  
[address]  841.0



     Of [place] HERRERA    

 

 Office Visit  2012  8:15a  [facility]  Ijeoma Hurt  
29155  719.44



     Of [facility] HERRERA    







Plan of Care

Future Appointment(s):2018 10:10 am - Sushma Hameed M.D. at [facility] 
Internal Medicine [facility]2018 10:30 am - Lindsey Johnston MD at 
[facility]2018 - Sushma Hameed M.D.E11.9 
Type 2 diabetes mellitus without complicationsNew Medication:Januvia 100 mgNew 
Labs:Urine Microalbumin RandomComments:Need to work on weight loss , which will 
help your diabetes , blood pressure, joints etcI10 Essential (primary) 
hypertensionNew Medication:Lisinopril 5 mgComments:discussed youe blood 
pressure is high so I would suggest adding a BP medication that also helps 
protect the kidneys long term with diabetescan cause high potassium and affect 
kidney function so please do the blood work a week before I see you and pay 
attention to diet ( do not eat high potassium foods)Follow up:2 wksM25.512 Pain 
in left shoulderReferral:Gary Jordan MD, Surgery,Orthopedic

## 2018-07-23 NOTE — ED
Shortness of Breath





- HPI Summary


HPI Summary: 


This is soniae Kel Mathew documenting for attending Dr. Emily Beavers MD.





A 59 y/o female presents to ED c/o sudden onset of SOB. Additionally c/o of non-

productive cough. According to the patient, she experienced and currently 

experiences suddent onset of dyspnea. As per triage, "Sudden onset of dyspnea, 

non productive cough. In triage, pt is severely SOB, 88% on RA. Upgraded triage 

to room immediately". Pt denies any fever, however, has chills. Pt has CPAP 

machine at home. Pt got off steroids last week. 





- History of Current Complaint


Chief Complaint: EDShortnessOfBreath


Time Seen by Provider: 07/22/18 23:42


Hx Obtained From: Patient


Onset/Duration: Sudden Onset, Still Present


Timing: Constant


Dyspnea At: Rest


Aggrevating Factors: Other - COUGH


Alleviating Factors: Nothing


Associated Signs & Symptoms: Cough (Nonproductive), Wheezing, Chills





- Allergy/Home Medications


Allergies/Adverse Reactions: 


 Allergies











Allergy/AdvReac Type Severity Reaction Status Date / Time


 


levofloxacin Allergy Severe Itching Verified 07/23/18 00:35














PMH/Surg Hx/FS Hx/Imm Hx


Endocrine/Hematology History: Reports: Hx Diabetes - glucose typically runs 80'

s - 130's, Hx Thyroid Disease


Cardiovascular History: Reports: Hx Hypercholesterolemia, Other Cardiovascular 

Problems/Disorders - increased heart rate 2 x in her life


   Denies: Hx Deep Vein Thrombosis, Hx Embolism, Hx Hypertension, Hx Myocardial 

Infarction, Hx Pacemaker/ICD


Respiratory History: Reports: Hx Asthma - well controlled, Hx Chronic 

Obstructive Pulmonary Disease (COPD), Hx Pneumonia, Hx Sleep Apnea


GI History: Reports: Hx Gall Bladder Disease - cholecystectomy 27 yrs ago, 

Other GI Disorders - diarrhea w/certain foods (ie. chips)


   Denies: Hx Crohn's Disease


 History: Reports: Other  Problems/Disorders - Frequent UTI's


   Denies: Hx Renal Disease


Musculoskeletal History: Reports: Hx Arthritis - joints, not in back


   Denies: Hx Back Problems


Sensory History: Reports: Hx Contacts or Glasses


   Denies: Hx Hearing Aid


Opthamlomology History: Reports: Hx Contacts or Glasses


Neurological History: Reports: Hx Migraine


Psychiatric History: Reports: Hx Anxiety


   Denies: Hx Panic Disorder





- Cancer History


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


Hx Chemotherapy: No


Hx Radiation Therapy: Yes





- Surgical History


Surgery Procedure, Year, and Place: 3-C SECTION ;  D&C; CYST-COCCYX REMOVED; 

GALLBLADDER- 24 YRS AGO,HYSTERECTOMY; Hernia Repain





- Immunization History


Date of Tetanus Vaccine: >10 years


Date of Influenza Vaccine: fall 2013


Infectious Disease History: Unable to Obtain/Confirm


Infectious Disease History: 


   Denies: Hx Shingles, Traveled Outside the [place] in Last 30 Days





- Family History


Known Family History: Positive: Other - Cancer


   Negative: Seizure Disorder





- Social History


Alcohol Use: None


Hx Substance Use: No


Substance Use Type: Reports: None


Hx Tobacco Use: Yes


Smoking Status (MU): Former Smoker


Type: Cigarettes


Have You Smoked in the Last Year: No





Review of Systems


Positive: Chills.  Negative: Fever


Positive: Shortness Of Breath, Cough


All Other Systems Reviewed And Are Negative: Yes





Physical Exam





- Summary


Physical Exam Summary: 


VITAL SIGNS: Reviewed.


GENERAL: Patient is a well-developed and nourished FEMALE who is lying 

comfortable in the stretcher. Patient is not in any acute respiratory distress. 

Morbidly obese


HEAD AND FACE: No signs of trauma. No ecchymosis, hematomas or skull 

depressions. No sinus tenderness.


EYES: PERRLA, EOMI x 2, No injected conjunctiva, no nystagmus.


EARS: Hearing grossly intact. Ear canals and tympanic membranes are within 

normal limits.


MOUTH: Oropharynx within normal limits.


NECK: Supple, trachea is midline, no adenopathy, no JVD, no carotid bruit, no c-

spine tenderness, neck with full ROM.


CHEST: Symmetric, no tenderness at palpation. Decreased breathe sounds 

bilaterally.


LUNGS: Clear to auscultation bilaterally. No wheezing or crackles. Mild 

respiratory distress. Bilateral rales.


CVS: Regular rate and rhythm, S1 and S2 present, no murmurs or gallops 

appreciated.


ABDOMEN: Soft, non-tender. No signs of distention. No rebound no guarding, and 

no masses palpated. Bowel sounds are normal.


EXTREMITIES: FROM in all major joints, bilateral plus 2 pedal edema, no 

cyanosis or clubbing.


NEURO: Alert and oriented x 3. No acute neurological deficits. Speech is normal 

and follows commands.


SKIN: Dry and warm





Triage Information Reviewed: Yes


Vital Signs On Initial Exam: 


 Initial Vitals











Temp Pulse Resp BP Pulse Ox


 


 99.5 F   119   24   125/92   96 


 


 07/22/18 23:39  07/22/18 23:39  07/22/18 23:39  07/22/18 23:39  07/22/18 23:39











Vital Signs Reviewed: Yes





Diagnostics





- Vital Signs


 Vital Signs











  Temp Pulse Resp BP Pulse Ox


 


 07/22/18 23:55   115  20   99


 


 07/22/18 23:39  99.5 F  119  24  125/92  96














- Laboratory


Result Diagrams: 


 07/23/18 00:11





 07/23/18 00:11


Lab Statement: Any lab studies that have been ordered have been reviewed, and 

results considered in the medical decision making process.





- Radiology


  ** CXR


Radiology Interpretation Completed By: ED Physician - Bilateral basil 

infiltrate pnemonia. Pending official report.





- EKG


  ** 0003


Cardiac Rate: Tachycardia - 114 BPM


EKG Rhythm: Sinus Tachycardia


EKG Interpretation: Normal axis. Normal interval. No ischemic changes





Course/Dx





- Course


Course Of Treatment: Patient was not given IV fluid because of the possibility 

of CHF. Pt also has 2+ pedal edema.





- Diagnoses


Provider Diagnoses: 


 COPD (chronic obstructive pulmonary disease), Pneumonia








- Physician Notifications


Discussed Care of Patient With: Fred Frankenberg


Time Discussed With Above Provider: 01:13


Instructed by Provider To: Other - Accepts patient for admission.





Discharge





- Sign-Out/Discharge


Documenting (check all that apply): Patient Departure - ADMIT





- Discharge Plan


Condition: Stable


Disposition: ADMITTED TO [facility]


Referrals: 


Sushma Hameed MD [Primary Care Provider] -

## 2018-07-23 NOTE — RAD
HISTORY: SOB



COMPARISONS: November 18, 2017, CTA dated July 23, 2018.



VIEWS: 1: frontal portable view of the chest at 12:04 AM



FINDINGS:

LINES AND TUBES: None.

CARDIOMEDIASTINAL SILHOUETTE: The cardiomediastinal silhouette is stable.

PLEURA: The costophrenic angles are sharp. No pleural abnormalities are noted.

LUNG PARENCHYMA: There is patchy alveolar opacification lung bases bilaterally.

ABDOMEN: The upper abdomen is clear. There is no subphrenic gas.

BONES AND SOFT TISSUES: No bone or soft tissue abnormalities are noted.



IMPRESSION: PATCHY BIBASILAR ATELECTASIS VERSUS CONSOLIDATION.



R0

## 2018-07-23 NOTE — PN
Subjective


Date of Service: 07/23/18


Interval History: 





Ms. Vazquez reports feeling very tired as she did not sleep last night.  She 

also continues to feel short of breath with a cough.  She denies chest pain, 

nausea, or abdominal pain.  She reports that she does feel better than when she 

first arrived yesterday.











Objective


Active Medications: 





Acetaminophen (Tylenol Tab*)  650 mg PO Q6H PRN


Albuterol (Ventolin Hfa Inhaler*)  2 puff INH Q6HR PRN


Albuterol/Ipratropium (Duoneb (Albuterol 2.5 Mg/Ipratropium 0.5 Mg))  1 neb INH 

QID MAKENNA


Atorvastatin Calcium (Lipitor*)  20 mg PO 1700 MAKENNA


Cholestyramine Resin (Questran*)  4 gm PO DAILY MAKENNA


Docusate Sodium (Colace Cap*)  200 mg PO BID MAKENNA


Guaifenesin (Mucinex*)  1,200 mg PO BID MAKENNA


Heparin Sodium (Porcine) (Heparin Vial(*))  5,000 units SUBCUT Q8HR MAKENNA


Azithromycin 500 mg/ Sodium (Chloride)  250 mls @ 250 mls/hr IVPB Q24H MAKENNA


Sodium Chloride (Ns 0.9% 1000 Ml*)  1,000 mls @ 125 mls/hr IV PER RATE MAKENNA


Sodium Chloride (Ns 0.9% 1000 Ml*)  1,250 mls @ 0 mls/hr IV WIDE OPEN MAKENNA


Ceftriaxone Sodium 1,000 mg/ (Sodium Chloride)  50 mls @ 200 mls/hr IVPB Q24H 

MAKENNA


Azithromycin 500 mg/ Sodium (Chloride)  250 mls @ 250 mls/hr IVPB Q24H [facility]


Insulin Human Lispro (Humalog*)  0 units SUBCUT ACHS MAKENNA; Protocol


Levothyroxine Sodium (Synthroid Tab*)  175 mcg PO DAILY@0600 [facility]


Melatonin (Melatonin)  3 mg PO BEDTIME PRN; Protocol


Metformin HCl (Glucophage*)  850 mg PO BID MAKENNA


Omeprazole (Prilosec Cap*)  20 mg PO DAILY@0600 [facility]


Ondansetron HCl (Zofran Odt Tab*)  4 mg PO Q6H PRN


Sitagliptin Phosphate (Januvia (Nf))  50 mg PO DAILY MAKENNA; Protocol


Tramadol HCl (Ultram*)  50 mg PO Q6H PRN





Vital Signs:











Temp Pulse Resp BP Pulse Ox


 


 98.1 F   102   22   132/59   97 


 


 07/23/18 07:19  07/23/18 07:19  07/23/18 07:19  07/23/18 07:19  07/23/18 07:19











Oxygen Devices in Use Now: Nasal Cannula


Appearance: Female lying in bed, appears tired but in NAD


Eyes: No Scleral Icterus


Ears/Nose/Mouth/Throat: Mucous Membranes Moist


Neck: Trachea Midline


Respiratory: Symmetrical Chest Expansion and Respiratory Effort, - - Rhonchi in 

bases noted


Cardiovascular: NL Sounds; No Murmurs; No JVD, No Edema


Abdominal: NL Sounds; No Tenderness; No Distention


Lymphatic: No Cervical Adenopathy


Extremities: No Edema


Skin: No Rash or Ulcers


Neurological: Alert and Oriented x 3, NL Muscle Strength and Tone


Nutrition: Taking PO's


Result Diagrams: 


 07/23/18 08:50





 07/23/18 08:50





Assess/Plan/Problems-Billing


Assessment: 





Ms. Vazquez is a 59 yo F with a PMH of COPD, DM, and HTN who was admitted with 

sepsis secondary to pneumonia with acute hypoxic respiratory failure.








- Patient Problems


(1) Pneumonia


Comment: 


- With sepsis based on 2 SIRS criteria, mild hypoxia, and mild ELIZABETH.


- Acute hypoxic respiratory failure stable, remains on 3L NC.


- Continue ceftriaxone and azithromycin.   





(2) COPD (chronic obstructive pulmonary disease)


Comment: 


- No wheezing to suggest exacerbation.


- Continue albuterol prn.   





(3) Diabetes mellitus


Comment: 


- Uncontrolled.   this AM, likely secondary to high dose steroids on 

arrival.


- Hold metformin and sitagliptin.  Continue lispro SSI coverage with meals. 

Start low dose lantus this AM, adjust as needed.   





(4) Hypertension


Comment: 


- SBP 130s, in 90s overnight.


- Hold diltiazem during acute illness. Continue IVF.   





(5) Dyslipidemia


Comment: 


- Continue atorvastatin and cholestyramine.   





(6) Hypothyroid


Comment: 


- TSH 3.25.


- Continue levothyroxine.   





(7) ADÁN (obstructive sleep apnea)


Comment: 


- Continue CPAP/BiPAP qPM.   





(8) DVT prophylaxis


Comment: 


- Heparin SQ.   





(9) Full code status


Current Visit: No   Comment: 


   


Status and Disposition: 





Inpatient with expected LOS > 2 days.  Anticipate discharge to home when 

medically stable.

## 2018-07-23 NOTE — XMS REPORT
Ysabel Vazquez

 Created on:2018



Patient:Ysabel Vazquez

Sex:Female

:1958

External Reference #:[id number]





Demographics







 Address  [address]



   [place] [address]

 

 Mobile Phone  [phone]

 

 Email Address  [email or web address]

 

 Preferred Language  English

 

 Marital Status  Not  Or 

 

 Mosque Affiliation  Unknown

 

 Race  White

 

 Ethnic Group  Not  Or 









Author







 Organization  [facility]

 

 Address  [address]



   [place] [address]

 

 Phone  [phone]









Support







 Name  Relationship  Address  Phone

 

 Ijeoma Hightower  Unavailable  Unavailable  [phone]









Care Team Providers







 Name  Role  Phone

 

 Sushma Hameed MD  Primary Care Physician  Unavailable









Payers







 Type  Date  Identification Numbers  Payment Provider  Subscriber

 

 Commercial  Effective:  Policy Number: [id number]  BS Neela Vazquez



   2012      









 PayID: [id number]  [address] [address]









 [place] [address]









 Medigap Part B  Effective:  Policy Number:  Blue Shield Ppo  Ysabel GUADARRAMA George



   2008  [id number]    









 Expires: 2011  PayID: [id number]  [address] [address]









 [place] [address]









 Medigap Part B  Effective: 2011  Policy Number:  BS Neela Vazquez



     [id number]    









 Expires: 2011  PayID: [id number]  [address] [address]









 [place] [id number]









 Workers Compensation  Effective:  Group Number:  Lisaerik GUADARRAMA George



   2014    Center  









 Onset: 2014  [address] [place] [address]







Problems







 Date  Description  Provider  Status

 

 Onset: 2014  Pure hyperglyceridemia  Sushma Hameed M.D.  Active

 

 Onset: 2014  Type 2 diabetes mellitus  Sushma Hameed M.D.  Active

 

 Onset: 2014  Obstructive sleep apnea syndrome  Sushma Hameed M.D.  
Active

 

 Onset: 2014  Hypothyroidism  Sushma Hameed M.D.  Active









   Note: XRT for graves









 Onset: 10/09/2014  Obstructive sleep apnea of adult  Iván Newby M.D.  Active

 

 Onset: 10/09/2014  Asthma without status asthmaticus  Iván Newby M.D.  Active

 

 Onset: 2015  Endometrial carcinoma  Sushma Haemed M.D.  Active









   Note: [id number] s/p hysterectomy & adjuvant XRT ( intravaginal bracytherapy )









 Onset: 2015  Former heavy tobacco smoker  Sushma Hameed M.D.  Active









   Note: 32 pk yr quit   CTA chest nl in 12/15









 Onset: 2015  Psoriasis  Sushma Hameed M.D.  Active









   Note: stable









 Onset: 2016  Morbid obesity  Lindsey Johnston MD  Active

 

 Onset: 2016  Osteoarthritis of knee  Sushma Hameed M.D.  Active

 

 Onset: 2017  Chronic obstructive lung disease  Sushma Hameed M.D.  
Active

 

 Onset: 2015  Hernia of anterior abdominal wall  Sushma Hameed M.D.  
Resolved









 Resolved: 2016









   Note: 6.2 cm conting loops of bowel







Family History







 Date  Family Member(s)  Problem(s)  Comments

 

   General  lung ca  mother

 

   General  breast ca  sister at age 60

 

   General  Diabetes, Non Insulin Dependent  sister ( age 62)

 

   General  Bladder Cancer  brother at age 63

 

   Children  3  







Social History







 Type  Date  Description  Comments

 

 Marital Status    Single  

 

 Lives With    Alone  Has 3 children

 

 Occupation    Currently Working  [employer] (



       personal needs )

 

 Work Status    Currently Working  

 

 Cigarette Use    Former Cigarette Smoker  

 

 ETOH Use    Denies alcohol use  

 

 Smoking    Patient is a former smoker  32 pk yr quit in 

 

 Recreational Drug Use    Denies Drug Use  

 

 Daily Caffeine    Consumes on average 1 cup of  



     hot tea per day  

 

 Exercise Type/Frequency    Exercises rarely  

 

 Exercise Type/Frequency    Exercises sporadically  walking







Allergies, Adverse Reactions, Alerts







 Date  Description  Reaction  Status  Severity  Comments

 

 2014  Levaquin  Urticaria  active    

 

 2013  NKDA    inactive    







Medications







 Medication  Date  Status  Form  Strength  Qnty  SIG  Indications  Ordering



                 Provider

 

 Januvia    Active  Tablets  100mg  90tab  1 by mouth  E11.9          s  every day    JAVIER Hameed

 

 Lisinopril    Active  Tablets  5mg  30tab  1 by mouth  I10          s  every day    JAVIER Hameed

 

                 

 

 Prednisone    Hx  Tablets  10mg  30tab  30mg daily  J44.1          s  for 1 week,    Quang Johnston          20mg daily    MD



             for 1 week,    



   /          10 mg daily    



             for 1 week    

 

 Cholestyramine    Active  Packet  4gm  1unit  4 gms every    Sushma        s  day as    Zari,



             needed    M.DKelley

 

 Atorvastatin    Active  Tablets  20mg  90tab  take one  E78.2  Sushma



 Calcium          s  tablet by    Zari,



             mouth at    M.D.



             bedtime    

 

 Cardizem CD    Active  Caps ER  120mg  90cap  1 by mouth  R00.2  Sushma



       24HR    s  every day    JAVIER Hameed

 

 Symbicort    Active  Aerosol  160-4.5mc  1unit  1 puffs  J45.909  Lindsey      g/Act  s  puffs twice    erik Johnston MD Lite    Active  Device    1unit  check    Sushma



 Blood Glucose          s  fingerstick    Zari,



 Monitoring            three times    M.D.



 System            daily or as    



             needed - DX:    



             250.00    

 

 Freestyle Lite    Active  Strips    100un  test up to    Sushma



 Test          its  3times a day    Zari,



             or as needed    M.D.



              dx code:    



             250.00    

 

 Freestyle    Active  Misc    100un  Test three    Sushma



 Lancets          its  times daily    Zari,



             or as needed    M.D.



             - .00    

 

 Metformin HCL    Active  Tablets  850mg  60tab  take one            s  tablet by    Zari



             mouth twice    M.D.



             a day    

 

 Albuterol    Active  Nebulizer  (2.5mg/3M  225ml  as needed 4    Lindsey



 Sulfate  /      L) 0.083%    times  a day    MD Vickie

 

 Ventolin HFA    Active  Aerosol  108(90Bas  1unit  2 puffs by    Lindsey



   /      e)  s  mouth four    Vickie,



         mcg/Act    times a day    MD



             as needed    

 

 Mucinex    Active  Tablets ER  600mg  60tab  1 tab bid po    Unknown



       12HR    s  prn    

 

 Imodium A-D    Active  Tablets  2mg        Unknown



   /0000              

 

 Levothyroxine    Active  Tablets  175mcg  90tab  take one    Sushma



 Sodium          s  tablet by    Hameed



             mouth every    M.D.



             day    

 

 Bipap  00  Active  Device      use at at  G47.33  Unknown



   /0000          bedtime    

 

 Bipap Mask And  0000  Active  Device      bipap  G47.33  Unknown



 Supplies  /0000          supplies -    



             headgear,    



             cushion,    



             tubing,    



             filters,    



             water    



             chamber for    



             sleep apnea    



             dx G43.77    

 

                 

 

 Doxycycline    Hx  Capsules  100mg  14cap  one tablet  J44.1  Lindsey



 Hyclate          s  twice daily    Vickie,



   -          for 7 days.    MD



                 

 

 Januvia    Hx  Tablets  50mg  90tab  1 by mouth  E11.9  Sushma



           s  every day    Zari,



   -              M.D.



                 

 

 Cephalexin    Hx  Tablets  500mg    1 tab every    Sushma          6 hrs X 7    Hameed,



   -          days    M.D.



                 

 

 Atorvastatin  03/15  Hx  Tablets  20mg  90tab  take one  E78.2  Sushma



 Calcium  /2016        s  tablet by    Zari,



   -          mouth at    M.D.



             bedtime    



                 

 

 Januvia  03/15  Hx  Tablets  25mg  30tab  take one  E11.9  Sushma



           s  tablet by    Zari,



   -          mouth every    M.D.



   11/27          day    



   /              

 

 Fluconazole    Hx  Tablets  100mg  10tab  1 tab by  110.8  Sushma



           s  mouth daily    aZri,



   -          x 10 days    M.D.



                 

 

 Warfarin Sodium    Hx  Tablets  10mg  60tab  Take one by    Sushma



           s  mouth 5 days    Zari,



   -          a week    M.D.



             (takes 5 mg.    



             2 days a    



             week) or as    



             directed    

 

 Freestyle Lite    Hx      100un  use as    Sushma



 Test Strip          its  directed    Hameed,



   -          three times    M.D.



             daily or as    



             needed dx:    



             250.00    

 

 Ferrous Fumarate    Hx  Tablets  324mg  60tab  1 tab daily    Sushma



   /        s      Zari,



   -              M.D.



   03/15              



   /2016              

 

 Stacy Contour    Hx  Strips    100un  test three    Sushma



 Blood Glucose          its  times daily    Hameed,



 Test Strips  -          or as needed    M.D.



                 

 

 Stacy Microlet    Hx  Misc    100un  three times    Sushma



 Lancets          its  daily or as    Hameed,



   -          needed    M.D.



                 

 

 Polytrim    Hx  Solution  46284-4.1  1unit  1 drop both  372.00  Christopher



         Unit/ML-%  s  eyes every    French,



   -          four hours    NP



             while awake    



             x's 5 days    

 

 Mometasone    Hx  Cream  0.1%  1unit  apply to  691.8  Sushma



 Fur        s  affected    Zari,



   -          areas twice    M.D.



   06/10          a day 10    



   /2015          days only    

 

 Keflex    Hx  Capsules  500mg  40cap  1 by mouth            s  four times a    Ordering



   -          day for 10    Provider



             days.    



   /              

 

 Olux    Hx  Foam  0.05%  1unit  apply daily  696.8  Sushma        s  X 10 days    Zari



   -              M.DKelley



                 

 

 Gemfibrozil    Hx  Tablets  600mg  180ta  Take One            bs  Tablet By    Zari,



   -          Mouth Twice    M.D.



             A Day    



   /              

 

 Advair Diskus    Hx  Aerosol  500-50mcg    1 puff bid  V72.84        /Dose        Zari



   -              M.D.



                 

 

 Metformin HCL    Hx  Tablets  500mg  180ta  1 po bid  V72.84          bs      Zari



   -              M.DKelley



                 

 

 Metformin HCL    Hx  Tablets  500mg  45tab  1 po bid X 1  V72.84  Maxim Reese



   /        s  week and    REYNALDO Mason,



   -          then 2 tab    M.D.,FACP



             in Am and           tab in PM    

 

 Advair Diskus    Hx  Aerosol  250-50mcg  1unit  1 puff bid  V72.84  
Sushma      /Dose  s      Zari



   -              M.DKelley



                 

 

 Azithromycin    Hx  Tablets  250mg    as directed    Unknown



   /0000              



   -              



                 

 

 Prednisone    Hx  Tablets  20mg    2 po qd    Unknown



   /0000              



   -              



                 

 

 Metformin HCL    Hx  Tablets  500mg    1 po qd    Unknown



   /0000              



   -              



                 

 

 Coumadin    Hx  Tablets  5mg  150ta  as directed.            bs      Quang Hameed M.D.



                 







Immunizations







 CPT Code  Status  Date  Vaccine  Reaction  Lot #

 

 [id number]  Given  2017  Influenza Virus Vaccine, Quadrivalent,    



       Split, Preservative Free    

 

 84560  Given  10/18/2016  Influenza Virus Vaccine, Quadrivalent,    [id number]



       Split Virus, Im Use    

 

 36075  Given  2015  Influenza Virus Vaccine, Quadrivalent,    [id number]



       Split, Preservative Free    

 

 29965  Given  2015  Pneumococcal Conjugate Vaccine 13    [id number]



       Valent For Intramuscular Use    

 

 25406  Given  10/06/2014  Influenza Virus Vaccine, Quadrivalent,  no reaction  
[id number]



       Split, Preservative Free    

 

 00819  Given  2014  Pneumonia Vaccine    [id number]







Vital Signs







 Date  Vital  Result  Comment

 

 2018  Height  61 inches  5'1"









 Respiratory Rate  20 /min  

 

 Pain Level  9  









 2018  Height  61 inches  5'1"









 Weight  332.00 lb  

 

 Heart Rate  83 /min  

 

 BP Systolic Sitting  160 mmHg  

 

 BP Diastolic Sitting  80 mmHg  

 

 O2 % BldC Oximetry  95 %  

 

 BMI (Body Mass Index)  62.7 kg/m2  









 2018  Height  61 inches  5'1"









 Weight  333.00 lb  

 

 Heart Rate  88 /min  

 

 BP Systolic Sitting  134 mmHg  

 

 BP Diastolic Sitting  70 mmHg  

 

 Respiratory Rate  14 /min  

 

 O2 % BldC Oximetry  95 %  

 

 BMI (Body Mass Index)  62.9 kg/m2  









 2017  Weight  329.00 lb  









 Heart Rate  89 /min  

 

 Body Temperature  97.8 F  

 

 O2 % BldC Oximetry  95 %  









 2017  Height  61 inches  5'1"









 Weight  314.00 lb  

 

 Heart Rate  93 /min  

 

 BP Systolic  120 mmHg  

 

 BP Diastolic  70 mmHg  

 

 Body Temperature  97.5 F  

 

 O2 % BldC Oximetry  96 %  

 

 BMI (Body Mass Index)  59.3 kg/m2  









 2017  Height  61 inches  5'1"









 Weight  317.50 lb  no shoes

 

 Heart Rate  86 /min  

 

 BP Systolic Sitting  128 mmHg  Lfa lrg cuff

 

 BP Diastolic Sitting  80 mmHg  Lfa lrg cuff

 

 BP Systolic Standing  130 mmHg  Lfa lrg cuff

 

 BP Diastolic Standing  84 mmHg  Lfa lrg cuff

 

 Respiratory Rate  20 /min  

 

 BMI (Body Mass Index)  60.0 kg/m2  









 10/19/2016  Height  61 inches  5'1"









 Weight  306.00 lb  

 

 Heart Rate  106 /min  

 

 BP Systolic Sitting  126 mmHg  

 

 BP Diastolic Sitting  78 mmHg  

 

 Respiratory Rate  18 /min  

 

 Body Temperature  98.6 F  

 

 BMI (Body Mass Index)  57.8 kg/m2  









 10/18/2016  Height  61 inches  5'1"









 Weight  309.00 lb  

 

 Heart Rate  96 /min  

 

 BP Systolic  146 mmHg  

 

 BP Diastolic  74 mmHg  

 

 BP Systolic Recheck  138 mmHg  

 

 BP Diastolic Recheck  72 mmHg  

 

 Body Temperature  99.1 F  

 

 O2 % BldC Oximetry  97 %  

 

 BMI (Body Mass Index)  58.4 kg/m2  









 2016  Height  61 inches  5'1"









 Weight  303.00 lb  

 

 Heart Rate  78 /min  

 

 BP Systolic Sitting  138 mmHg  

 

 BP Diastolic Sitting  78 mmHg  

 

 Respiratory Rate  16 /min  

 

 O2 % BldC Oximetry  98 %  

 

 BMI (Body Mass Index)  57.2 kg/m2  









 2016  Height  61 inches  5'1"









 Weight  303.00 lb  

 

 Heart Rate  83 /min  

 

 BP Systolic  140 mmHg  

 

 BP Diastolic  70 mmHg  

 

 Body Temperature  97.9 F  

 

 O2 % BldC Oximetry  96 %  

 

 BMI (Body Mass Index)  57.2 kg/m2  









 03/15/2016  Weight  311.00 lb  









 Heart Rate  86 /min  

 

 BP Systolic Sitting  151 mmHg  

 

 BP Diastolic Sitting  73 mmHg  

 

 O2 % BldC Oximetry  97 %  









 2016  Height  61 inches  5'1"









 Weight  307.00 lb  

 

 Heart Rate  102 /min  

 

 BP Systolic  132 mmHg  Ra lower, reg cuff

 

 BP Diastolic  78 mmHg  Ra lower, reg cuff

 

 BP Systolic Sitting  130 mmHg  LA lower, reg cuff

 

 BP Diastolic Sitting  78 mmHg  LA lower, reg cuff

 

 BP Systolic Standing  128 mmHg  Ra lower, reg cuff

 

 BP Diastolic Standing  780 mmHg  Ra lower, reg cuff

 

 Respiratory Rate  16 /min  

 

 BMI (Body Mass Index)  58.0 kg/m2  









 2015  Weight  310.00 lb  









 Heart Rate  92 /min  

 

 BP Systolic Sitting  128 mmHg  

 

 BP Diastolic Sitting  84 mmHg  

 

 Respiratory Rate  14 /min  

 

 Body Temperature  98.3 F  

 

 O2 % BldC Oximetry  97 %  









 2015  Weight  297.00 lb  









 Heart Rate  91 /min  

 

 BP Systolic Sitting  128 mmHg  

 

 BP Diastolic Sitting  62 mmHg  

 

 Body Temperature  97.7 F  

 

 O2 % BldC Oximetry  96 %  









 2015  Height  60.25 inches  5'0.25"









 Weight  298.00 lb  

 

 Heart Rate  95 /min  

 

 BP Systolic  128 mmHg  

 

 BP Diastolic  80 mmHg  

 

 Respiratory Rate  16 /min  

 

 O2 % BldC Oximetry  98 %  

 

 BMI (Body Mass Index)  57.7 kg/m2  









 2015  Height  60.25 inches  5'0.25"









 Weight  298.00 lb  

 

 Heart Rate  87 /min  

 

 BP Systolic  147 mmHg  

 

 BP Diastolic  80 mmHg  

 

 Body Temperature  98.5 F  

 

 BMI (Body Mass Index)  57.7 kg/m2  









 06/10/2015  Height  60.25 inches  5'0.25"









 Weight  298.00 lb  

 

 Heart Rate  90 /min  

 

 BP Systolic Sitting  140 mmHg  

 

 BP Diastolic Sitting  84 mmHg  

 

 Body Temperature  98.3 F  

 

 O2 % BldC Oximetry  96 %  

 

 BMI (Body Mass Index)  57.7 kg/m2  









 2015  Weight  316.00 lb  









 Heart Rate  100 /min  

 

 BP Systolic Sitting  126 mmHg  

 

 BP Diastolic Sitting  80 mmHg  

 

 Body Temperature  97.3 F  

 

 O2 % BldC Oximetry  94 %  









 2015  Height  60.25 inches  5'0.25"









 Weight  312.50 lb  

 

 Heart Rate  93 /min  

 

 BP Systolic Sitting  134 mmHg  

 

 BP Diastolic Sitting  70 mmHg  

 

 Body Temperature  97.9 F  

 

 BMI (Body Mass Index)  60.5 kg/m2  









 2014  Height  60.25 inches  5'0.25"









 Weight  319.00 lb  

 

 Heart Rate  80 /min  

 

 BP Systolic Sitting  130 mmHg  left forearm

 

 BP Diastolic Sitting  78 mmHg  left forearm

 

 Respiratory Rate  16 /min  

 

 Body Temperature  99.0 F  

 

 O2 % BldC Oximetry  97 %  Room air

 

 BMI (Body Mass Index)  61.8 kg/m2  









 2014  Weight  329.00 lb  









 Heart Rate  72 /min  

 

 BP Systolic Sitting  128 mmHg  

 

 BP Diastolic Sitting  84 mmHg  









 10/09/2014  Height  60.25 inches  5'0.25"









 Weight  330.00 lb  w/shoes

 

 Heart Rate  100 /min  

 

 BP Systolic Sitting  140 mmHg  L forearm

 

 BP Diastolic Sitting  68 mmHg  L forearm

 

 Respiratory Rate  16 /min  

 

 Body Temperature  100.4 F  

 

 O2 % BldC Oximetry  95 %  

 

 BMI (Body Mass Index)  63.9 kg/m2  

 

 Neck Circumference in inches  17  









 10/06/2014  Height  60.25 inches  5'0.25"









 Weight  330.00 lb  

 

 Heart Rate  88 /min  

 

 BP Systolic Sitting  134 mmHg  

 

 BP Diastolic Sitting  82 mmHg  

 

 BMI (Body Mass Index)  63.9 kg/m2  









 2014  Height  60.25 inches  5'0.25"









 Weight  324.50 lb  

 

 Heart Rate  93 /min  

 

 BP Systolic Sitting  126 mmHg  

 

 BP Diastolic Sitting  76 mmHg  

 

 BMI (Body Mass Index)  62.8 kg/m2  









 2014  Weight  318.00 lb  









 Heart Rate  102 /min  

 

 BP Systolic Sitting  132 mmHg  

 

 BP Diastolic Sitting  80 mmHg  









 2014  Height  60.25 inches  5'0.25"









 Weight  328.00 lb  

 

 Heart Rate  79 /min  

 

 BP Systolic Standing  128 mmHg  

 

 BP Diastolic Standing  68 mmHg  

 

 Body Temperature  97.2 F  

 

 O2 % BldC Oximetry  94 %  

 

 BMI (Body Mass Index)  63.5 kg/m2  









 2013  Height  60.25 inches  5'0.25"









 Weight  332.00 lb  

 

 Heart Rate  93 /min  

 

 BP Systolic Sitting  110 mmHg  

 

 BP Diastolic Sitting  82 mmHg  

 

 Body Temperature  98.2 F  

 

 BMI (Body Mass Index)  64.3 kg/m2  







Results







 Test  Date  Test  Result  H/L  Range  Note

 

 Laboratory test finding  2018  Hemoglobin A1c  7.3  High  5-7  

 

 Laboratory test finding  2017  Hemoglobin A1c  6.8    5-7  

 

 Rapid Influenza A & B  2017  Influenza A Molecular  NEGATIVE    Negative
  1



 Molecular            









 Influenza B Molecular  NEGATIVE    Negative  









 Laboratory test  2017  Rapid Influenza A B  SEE RESULT BELOW      2



 finding    Antigen        

 

 Laboratory test  2017  TSH (Thyroid Stim  3.25 mcIU/mL    0.34-5.60  



 finding    Horm)        









 T3 Free  2.50 pg/mL    2.5-3.9  

 

 Free T4 (Free Thyroxine)  0.99 ng/dL    0.61-1.12  









 Laboratory test  2017  Hemoglobin A1c (Glyco  6.3 %  High  Less than 6.0
  3, 4



 finding    HGB)        









 Hepatitis C Antibody  Nonreactive    Nonreactive  3









 Lipid Profile (Trig/Chol/HDL)  2017  Triglycerides  254 mg/dL      3, 5









 Cholesterol  158 mg/dL      3, 6

 

 HDL Cholesterol  46.6 mg/dL      3, 7

 

 LDL Cholesterol  61 mg/dL      3, 8









 Liver Function Panel  10/18/2016  Total Protein  6.5 g/dL    6.4-8.9  









 Albumin  3.7 g/dL    3.2-5.2  

 

 Globulin  2.8 g/dL    2-4  

 

 Albumin/Globulin Ratio  1.3    1-3  

 

 Total Bilirubin  0.40 mg/dL    0.2-1.0  

 

 Direct Bilirubin  0.10 mg/dL    0.03-0.18  

 

 Indirect Bilirubin  0.3 mg/dL    0.3-1.0  

 

 Alkaline Phosphatase  113 U/L  High    

 

 Alt  33 U/L    7-52  

 

 Ast  23 U/L    13-39  









 Urine Microalbumin Random  10/18/2016  Urine Creatinine  145.54 mg/dL      









 Ur Microalbumin (mg/L)  < 15.0 mg/L      

 

 Urine Microalbumin/Creatinine  TNP ug/mg    <31  9









 Lipid Profile (Trig/Chol/HDL)  2016  Triglycerides  132 mg/dL      10









 Cholesterol  207 mg/dL      11

 

 HDL Cholesterol  33.5 mg/dL      12

 

 LDL Cholesterol  147 mg/dL      13









 Laboratory test  2016  Hemoglobin A1c (Glyco  6.0 %    Less than 6.0  14



 finding    HGB)        

 

 CBC Auto Diff  2016  White Blood Count  9.1 10^3/uL    3.5-10.8  









 Red Blood Count  4.15 10^6/uL    4.0-5.4  

 

 Hemoglobin  12.7 g/dL    12.0-16.0  

 

 Hematocrit  39 %    35-47  

 

 Mean Corpuscular Volume  94 fL    80-97  

 

 Mean Corpuscular Hemoglobin  31 pg    27-31  

 

 Mean Corpuscular HGB Conc  33 g/dL    31-36  

 

 Red Cell Distribution Width  14 %    10.5-15  

 

 Platelet Count  371 10^3/uL    150-450  

 

 Mean Platelet Volume  8 um3    7.4-10.4  

 

 Abs Neutrophils  6.8 10^3/uL    1.5-7.7  

 

 Abs Lymphocytes  1.5 10^3/uL    1.0-4.8  

 

 Abs Monocytes  0.6 10^3/uL    0-0.8  

 

 Abs Eosinophils  0.2 10^3/uL    0-0.6  

 

 Abs Basophils  0.1 10^3/uL    0-0.2  

 

 Abs Nucleated RBC  0.01 10^3/uL      

 

 Granulocyte %  74.4 %    38-83  

 

 Lymphocyte %  16.1 %  Low  25-47  

 

 Monocyte %  6.9 %    1-9  

 

 Eosinophil %  2.0 %    0-6  

 

 Basophil %  0.6 %    0-2  

 

 Nucleated Red Blood Cells %  0.1      









 Urinalysis Profile  2016  Urine Color  Straw      









 Urine Appearance  Clear      

 

 Urine Specific Gravity  1.008  Low  1.010-1.030  

 

 Urine pH  5.0    5-9  

 

 Urine Urobilinogen  Negative    Negative  

 

 Urine Ketones  Negative    Negative  

 

 Urine Protein  Negative    Negative  

 

 Urine Leukocytes  Negative    Negative  

 

 Urine Blood  Negative    Negative  

 

 Urine Nitrite  Negative    Negative  

 

 Urine Bilirubin  Negative    Negative  

 

 Urine Glucose  Negative    Negative  









 Inr/Protime  2016  Inr  0.96    0.89-1.11  

 

 Laboratory test finding  2016  Partial Thrombo Time  52.7 seconds  High  
26.0-36.3  



     PTT        









 D Dimer Quantitative  < 200 ng/mL    Less Than 230  15

 

 Lactic Acid  1.1 mmol/L    0.5-2.0  16

 

 Troponin-I (TnI)  0.00 ng/mL    <0.03  17









 Comp Metabolic Panel  2016  Sodium  135 mmol/L    133-145  









 Potassium  3.9 mmol/L    3.5-5.0  

 

 Chloride  102 mmol/L    101-111  

 

 Co2 Carbon Dioxide  23 mmol/L    22-32  

 

 Anion Gap  10 mmol/L    2-11  

 

 Glucose  153 mg/dL  High    

 

 Blood Urea Nitrogen  22 mg/dL    6-24  

 

 Creatinine  0.74 mg/dL    0.51-0.95  

 

 BUN/Creatinine Ratio  29.7  High  8-20  

 

 Calcium  9.4 mg/dL    8.6-10.3  

 

 Total Protein  7.6 g/dL    6.4-8.9  

 

 Albumin  4.0 g/dL    3.2-5.2  

 

 Globulin  3.6 g/dL    2-4  

 

 Albumin/Globulin Ratio  1.1    1-3  

 

 Total Bilirubin  0.40 mg/dL    0.2-1.0  

 

 Alkaline Phosphatase  81 U/L      

 

 Alt  9 U/L    7-52  

 

 Ast  11 U/L  Low  13-39  

 

 Egfr Non-  80.6    >60  

 

 Egfr   103.7    >60  18









 Laboratory test finding  2016  Magnesium  2.1 mg/dL    1.9-2.7  









 Amylase  35 U/L      

 

 C Reactive Protein  6.75 mg/L  High  < 5.00  19

 

 TSH (Thyroid Stim Horm)  1.47 ?IU/mL    0.34-5.60  

 

 Lipase  81 U/L    11.0-82.0  









 Laboratory test finding  2016  HPV Rna Ww/Reflex  Negative    Negative  
20, 21



     Genotype        









 Cytology  SEE RESULT BELOW      20, 22









 Comp Metabolic Panel  2016  Sodium  135 mmol/L    133-145  









 Potassium  4.3 mmol/L    3.5-5.0  

 

 Chloride  99 mmol/L  Low  101-111  

 

 Co2 Carbon Dioxide  29 mmol/L    22-32  

 

 Anion Gap  7 mmol/L    2-11  

 

 Glucose  131 mg/dL  High    

 

 Blood Urea Nitrogen  21 mg/dL    6-24  

 

 Creatinine  0.79 mg/dL    0.51-0.95  

 

 BUN/Creatinine Ratio  26.6  High  8-20  

 

 Calcium  9.7 mg/dL    8.6-10.3  

 

 Total Protein  7.8 g/dL    6.4-8.9  

 

 Albumin  4.2 g/dL    3.2-5.2  

 

 Globulin  3.6 g/dL    2-4  

 

 Albumin/Globulin Ratio  1.2    1-3  

 

 Total Bilirubin  0.40 mg/dL    0.2-1.0  

 

 Alkaline Phosphatase  85 U/L      

 

 Alt  11 U/L    7-52  

 

 Ast  11 U/L  Low  13-39  

 

 Egfr Non-  75.0    >60  

 

 Egfr   96.5    >60  23









 CBC Auto Diff  2016  White Blood Count  6.9 10^3/uL    3.5-10.8  









 Red Blood Count  4.42 10^6/uL    4.0-5.4  

 

 Hemoglobin  13.6 g/dL    12.0-16.0  

 

 Hematocrit  41 %    35-47  

 

 Mean Corpuscular Volume  94 fL    80-97  

 

 Mean Corpuscular Hemoglobin  31 pg    27-31  

 

 Mean Corpuscular HGB Conc  33 g/dL    31-36  

 

 Red Cell Distribution Width  14 %    10.5-15  

 

 Platelet Count  372 10^3/uL    150-450  

 

 Mean Platelet Volume  8 um3    7.4-10.4  

 

 Abs Neutrophils  4.3 10^3/uL    1.5-7.7  

 

 Abs Lymphocytes  1.9 10^3/uL    1.0-4.8  

 

 Abs Monocytes  0.5 10^3/uL    0-0.8  

 

 Abs Eosinophils  0.2 10^3/uL    0-0.6  

 

 Abs Basophils  0.1 10^3/uL    0-0.2  

 

 Abs Nucleated RBC  0.01 10^3/uL      

 

 Granulocyte %  61.7 %    38-83  

 

 Lymphocyte %  26.9 %    25-47  

 

 Monocyte %  7.9 %    1-9  

 

 Eosinophil %  2.7 %    0-6  

 

 Basophil %  0.8 %    0-2  

 

 Nucleated Red Blood Cells %  0.1      









 Laboratory test  2016  D Dimer Quantitative  < 200 ng/mL    Less Than 
230  24



 finding            

 

 Laboratory test  03/15/2016  Hemoglobin A1c  7.2  High  5-7  



 finding            

 

 CBC Auto Diff  2015  White Blood Count  9.6 10^3/uL    3.5-10.8  









 Red Blood Count  4.44 10^6/uL    4.0-5.4  

 

 Hemoglobin  13.7 g/dL    12.0-16.0  

 

 Hematocrit  41 %    35-47  

 

 Mean Corpuscular Volume  93 fL    80-97  

 

 Mean Corpuscular Hemoglobin  31 pg    27-31  

 

 Mean Corpuscular HGB Conc  33 g/dL    31-36  

 

 Red Cell Distribution Width  15 %    10.5-15  

 

 Platelet Count  360 10^3/uL    150-450  

 

 Mean Platelet Volume  7 um3  Low  7.4-10.4  

 

 Abs Neutrophils  6.8 10^3/uL    1.5-7.7  

 

 Abs Lymphocytes  1.9 10^3/uL    1.0-4.8  

 

 Abs Monocytes  0.7 10^3/uL    0-0.8  

 

 Abs Eosinophils  0.1 10^3/uL    0-0.6  

 

 Abs Basophils  0.1 10^3/uL    0-0.2  

 

 Abs Nucleated RBC  0.01 10^3/uL      

 

 Granulocyte %  70.9 %    38-83  

 

 Lymphocyte %  20.3 %  Low  25-47  

 

 Monocyte %  6.9 %    1-9  

 

 Eosinophil %  1.3 %    0-6  

 

 Basophil %  0.6 %    0-2  

 

 Nucleated Red Blood Cells %  0.1      









 Inr/Protime  2015  Inr  0.97    0.89-1.11  

 

 Laboratory test finding  2015  Lactic Acid  1.6 mmol/L    0.5-2.0  25

 

 Comp Metabolic Panel  2015  Sodium  136 mmol/L    133-145  









 Potassium  4.5 mmol/L    3.5-5.0  

 

 Chloride  98 mmol/L  Low  101-111  

 

 Co2 Carbon Dioxide  30 mmol/L    22-32  

 

 Anion Gap  8 mmol/L    2-11  

 

 Glucose  194 mg/dL  High    

 

 Blood Urea Nitrogen  23 mg/dL    6-24  

 

 Creatinine  0.88 mg/dL    0.51-0.95  

 

 BUN/Creatinine Ratio  26.1  High  8-20  

 

 Calcium  10.0 mg/dL    8.6-10.3  

 

 Total Protein  7.9 g/dL    6.4-8.9  

 

 Albumin  4.3 g/dL    3.2-5.2  

 

 Globulin  3.6 g/dL    2-4  

 

 Albumin/Globulin Ratio  1.2    1-3  

 

 Total Bilirubin  0.50 mg/dL    0.2-1.0  

 

 Alkaline Phosphatase  81 U/L      

 

 Alt  11 U/L    7-52  

 

 Ast  10 U/L  Low  13-39  

 

 Egfr Non-  66.2    >60  

 

 Egfr   85.2    >60  26









 Laboratory test finding  2015  Magnesium  2.1 mg/dL    1.9-2.7  









 Troponin-I (TnI)  0.00 ng/mL    <0.03  27

 

 TSH (Thyroid Stim Horm)  1.41 ?IU/mL    0.34-5.60  









 Laboratory test finding  2015  Inr/Protime  1.97  High  0.89-1.11  28

 

 Laboratory test finding  10/27/2015  Inr/Protime  2.06  High  0.78-1.07  

 

 Inr/Protime  10/20/2015  Inr  2.33  High  0.78-1.07  

 

 Laboratory test finding  10/13/2015  Inr/Protime  3.16  High  0.78-1.07  

 

 Laboratory test finding  10/06/2015  Inr/Protime  1.74  High  0.78-1.07  

 

 Laboratory test finding  2015  Inr/Protime  1.79  High  0.78-1.07  

 

 Laboratory test finding  2015  Hemoglobin A1c  6.8    5-7  

 

 Laboratory test finding  2015  Inr/Protime  2.29  High  0.78-1.07  

 

 Laboratory test finding  2015  Inr/Protime  2.23  High  0.78-1.07  

 

 Laboratory test finding  2015  Inr/Protime  1.57  High  0.78-1.07  

 

 Laboratory test finding  2015  Inr/Protime  2.98  High  0.78-1.07  

 

 Laboratory test finding  2015  Inr/Protime  2.06  High  0.78-1.07  

 

 Inr/Protime  2015  Inr  2.13  High  0.78-1.07  

 

 Inr/Protime  2015  Inr  1.61  High  0.78-1.07  

 

 Protime W/ Inr  2015  Inr  1.3      

 

 Inr/Protime  2015  Inr  1.61  High  0.78-1.07  

 

 CBC Auto Diff  06/10/2015  White Blood Count  7.3 10^3/uL    4.8-10.8  









 Red Blood Count  3.84 10^6/uL  Low  4.0-5.4  

 

 Hemoglobin  11.3 g/dL  Low  12.0-16.0  

 

 Hematocrit  35 %    35-47  

 

 Mean Corpuscular Volume  92 fL    80-97  

 

 Mean Corpuscular Hemoglobin  30 pg    27-31  

 

 Mean Corpuscular HGB Conc  32 g/dL    31-36  

 

 Red Cell Distribution Width  16 %  High  10.5-15  

 

 Platelet Count  453 10^3/uL  High  150-450  

 

 Mean Platelet Volume  7 um3  Low  7.4-10.4  

 

 Abs Neutrophils  4.2 10^3/uL    1.5-7.7  

 

 Abs Lymphocytes  2.0 10^3/uL    1.0-4.8  

 

 Abs Monocytes  0.6 10^3/uL    0-0.8  

 

 Abs Eosinophils  0.4 10^3/uL    0-0.6  

 

 Abs Basophils  0.1 10^3/uL    0-0.2  

 

 Abs Nucleated RBC  0.01 10^3/uL      

 

 Granulocyte %  58.0 %    38-83  

 

 Lymphocyte %  27.1 %    25-47  

 

 Monocyte %  8.4 %    1-9  

 

 Eosinophil %  5.6 %    0-6  

 

 Basophil %  0.9 %    0-2  

 

 Nucleated Red Blood Cells %  0.1      









 Basic Metabolic Panel  06/10/2015  Sodium  134 mmol/L    133-145  









 Potassium  4.7 mmol/L    3.5-5.0  

 

 Chloride  99 mmol/L  Low  101-111  

 

 Co2 Carbon Dioxide  29 mmol/L    22-32  

 

 Anion Gap  6 mmol/L    2-11  

 

 Glucose  160 mg/dL  High    

 

 Blood Urea Nitrogen  21 mg/dL    6-24  

 

 Creatinine  0.62 mg/dL    0.51-0.95  

 

 BUN/Creatinine Ratio  33.9  High  8-20  

 

 Calcium  9.8 mg/dL    8.6-10.3  

 

 Egfr Non-  99.2    >60  

 

 Egfr   127.6    >60  29









 Laboratory test finding  06/10/2015  Magnesium  2.1 mg/dL    1.9-2.7  

 

 Protime W/ Inr  06/10/2015  Prothrombin Time  27.8      









 Inr  2.3      









 Laboratory test finding  04/10/2015  Free T4  0.75 ng/mL    0.61-1.12  30, 31









 Free T3  2.70 pg/mL    2.5-3.9  30, 32

 

 TSH (Thyroid Stimulating Horm)  1.66 IU/mL    0.34-5.60  30, 33









 Lipid Profile (Trig/Chol/HDL)  04/10/2015  Triglycerides  156 mg/dL      30, 34









 Cholesterol  218 mg/dL      30, 35

 

 HDL Cholesterol  42.7 mg/dL      30, 36

 

 LDL Cholesterol  144 mg/dL      30, 37









 CBC Auto Diff  04/10/2015  White Blood Count  8.1 10^3/uL    4.8-10.8  30









 Red Blood Count  4.34 10^6/uL    4.0-5.4  30

 

 Hemoglobin  13.2 g/dL    12.0-16.0  30

 

 Hematocrit  40 %    35-47  30

 

 Mean Corpuscular Volume  92 fL    80-97  30

 

 Mean Corpuscular Hemoglobin  30 pg    27-31  30

 

 Mean Corpuscular HGB Conc  33 g/dL    31-36  30

 

 Red Cell Distribution Width  15 %    10.5-15  30

 

 Platelet Count  338 10^3/uL    150-450  30

 

 Mean Platelet Volume  8 um3    7.4-10.4  30

 

 Abs Neutrophils  6.0 10^3/uL    1.5-7.7  30

 

 Abs Lymphocytes  1.4 10^3/uL    1.0-4.8  30

 

 Abs Monocytes  0.5 10^3/uL    0-0.8  30

 

 Abs Eosinophils  0.1 10^3/uL    0-0.6  30

 

 Abs Basophils  0.1 10^3/uL    0-0.2  30

 

 Abs Nucleated RBC  0 10^3/uL      30

 

 Granulocyte %  74.3 %    38-83  30

 

 Lymphocyte %  16.8 %  Low  25-47  30

 

 Monocyte %  6.7 %    1-9  30

 

 Eosinophil %  1.4 %    0-6  30

 

 Basophil %  0.8 %    0-2  30

 

 Nucleated Red Blood Cells %  0      30









 Urine Microalbumin Random  04/10/2015  Ur Microalbumin (mg/L)  9.0 mg/L      30









 Urine Creatinine  74.15 mg/dL      30

 

 Urine Microalbumin/Creatinine  12.1    Less Than 31  30









 Laboratory test finding  2015  Hemoglobin A1c  6.7    5-7  

 

 Laboratory test finding  2015  TSH (Thyroid Stim Horm)  <pending>      









 Free T4 (Free Thyroxine)  <pending>      

 

 T3 Free  <pending>      









 CBC Auto Diff  2015  White Blood Count  15.9 10^3/uL  High  4.8-10.8  









 Red Blood Count  4.79 10^6/uL    4.0-5.4  

 

 Hemoglobin  14.2 g/dL    12.0-16.0  

 

 Hematocrit  43 %    35-47  

 

 Mean Corpuscular Volume  91 fL    80-97  

 

 Mean Corpuscular Hemoglobin  30 pg    27-31  

 

 Mean Corpuscular HGB Conc  33 g/dL    31-36  

 

 Red Cell Distribution Width  15 %    10.5-15  

 

 Platelet Count  469 10^3/uL  High  150-450  

 

 Mean Platelet Volume  8 um3    7.4-10.4  

 

 Abs Neutrophils  13.5 10^3/uL  High  1.5-7.7  

 

 Abs Lymphocytes  1.6 10^3/uL    1.0-4.8  

 

 Abs Monocytes  0.6 10^3/uL    0-0.8  

 

 Abs Eosinophils  0.1 10^3/uL    0-0.6  

 

 Abs Basophils  0.1 10^3/uL    0-0.2  

 

 Abs Nucleated RBC  0.01 10^3/uL      

 

 Granulocyte %  84.7 %  High  38-83  

 

 Lymphocyte %  10.1 %  Low  25-47  

 

 Monocyte %  3.9 %    1-9  

 

 Eosinophil %  0.8 %    0-6  

 

 Basophil %  0.5 %    0-2  

 

 Nucleated Red Blood Cells %  0      









 Comp Metabolic Panel  2015  Sodium  135 mmol/L    133-145  









 Potassium  3.8 mmol/L    3.5-5.0  

 

 Chloride  101 mmol/L    101-111  

 

 Co2 Carbon Dioxide  24 mmol/L    22-32  

 

 Anion Gap  10 mmol/L    2-11  

 

 Glucose  295 mg/dL  High    

 

 Blood Urea Nitrogen  13 mg/dL    6-24  

 

 Creatinine  0.83 mg/dL    0.51-0.95  

 

 BUN/Creatinine Ratio  15.7    8-20  

 

 Calcium  9.3 mg/dL    8.6-10.3  

 

 Total Protein  7.1 g/dL    6.4-8.9  

 

 Albumin  3.9 g/dL    3.2-5.2  

 

 Globulin  3.2 g/dL    2-4  

 

 Albumin/Globulin Ratio  1.2    1-3  

 

 Total Bilirubin  0.40 mg/dL    0.2-1.0  

 

 Alkaline Phosphatase  78 U/L      

 

 Alt  10 U/L    7-52  

 

 Ast  11 U/L  Low  13-39  

 

 Egfr Non-  71.1    >60  

 

 Egfr   91.5    >60  38









 Laboratory test finding  2015  Lipase  103 U/L  High  11.0-82.0  









 C Reactive Protein  3.68 mg/L    < 5.00  39









 Blood Culture  2015  Blood Culture  (SEE NOTE)      40

 

 CBC Auto Diff  2015  White Blood Count  6.8 10^3/uL    4.8-10.8  









 Red Blood Count  3.92 10^6/uL  Low  4.0-5.4  

 

 Hemoglobin  11.7 g/dL  Low  12.0-16.0  

 

 Hematocrit  35 %    35-47  

 

 Mean Corpuscular Volume  90 fL    80-97  

 

 Mean Corpuscular Hemoglobin  30 pg    27-31  

 

 Mean Corpuscular HGB Conc  33 g/dL    31-36  

 

 Red Cell Distribution Width  15 %    10.5-15  

 

 Platelet Count  301 10^3/uL    150-450  

 

 Mean Platelet Volume  8 um3    7.4-10.4  

 

 Abs Neutrophils  5.1 10^3/uL    1.5-7.7  

 

 Abs Lymphocytes  0.8 10^3/uL  Low  1.0-4.8  

 

 Abs Monocytes  0.8 10^3/uL    0-0.8  

 

 Abs Eosinophils  0 10^3/uL    0-0.6  

 

 Abs Basophils  0 10^3/uL    0-0.2  

 

 Abs Nucleated RBC  0 10^3/uL      

 

 Granulocyte %  75.0 %    38-83  

 

 Lymphocyte %  12.5 %  Low  25-47  

 

 Monocyte %  11.4 %  High  1-9  

 

 Eosinophil %  0.6 %    0-6  

 

 Basophil %  0.5 %    0-2  

 

 Nucleated Red Blood Cells %  0      









 Comp Metabolic Panel  2015  Sodium  133 mmol/L    133-145  









 Potassium  4.0 mmol/L    3.5-5.0  

 

 Chloride  101 mmol/L    101-111  

 

 Co2 Carbon Dioxide  25 mmol/L    22-32  

 

 Anion Gap  7 mmol/L    2-11  

 

 Glucose  162 mg/dL  High    

 

 Blood Urea Nitrogen  18 mg/dL    6-24  

 

 Creatinine  0.68 mg/dL    0.51-0.95  

 

 BUN/Creatinine Ratio  26.5  High  8-20  

 

 Calcium  9.6 mg/dL    8.6-10.3  

 

 Total Protein  7.0 g/dL    6.4-8.9  

 

 Albumin  3.9 g/dL    3.2-5.2  

 

 Globulin  3.1 g/dL    2-4  

 

 Albumin/Globulin Ratio  1.3    1-3  

 

 Total Bilirubin  0.40 mg/dL    0.2-1.0  

 

 Alkaline Phosphatase  77 U/L      

 

 Alt  11 U/L    7-52  

 

 Ast  11 U/L  Low  13-39  

 

 Egfr Non-  89.5    >60  

 

 Egfr   115.1    >60  41









 Laboratory test finding  2015  Lactic Acid  1.0 mmol/L    0.5-2.2  









 Blood Culture  (SEE NOTE)      42









 Rapid Influenza A B  2015  Rapid Influenza A   B  (SEE NOTE)      43



 Antigen    Antigen        

 

 Laboratory test finding  2014  Blood Urea Nitrogen  13 mg/dL    6-24  

 

 Creatinine  2014  Creatinine  0.66 mg/dL    0.51-0.95  









 Egfr Non-  92.6    >60  

 

 Egfr   119.1    >60  44









 CBC Auto Diff  10/31/2014  White Blood Count  23.2 10^3/uL  High  4.8-10.8  









 Red Blood Count  4.06 10^6/uL    4.0-5.4  

 

 Hemoglobin  12.5 g/dL    12.0-16.0  

 

 Hematocrit  38 %    35-47  

 

 Mean Corpuscular Volume  92 fL    80-97  

 

 Mean Corpuscular Hemoglobin  31 pg    27-31  

 

 Mean Corpuscular HGB Conc  33 g/dL    31-36  

 

 Red Cell Distribution Width  14 %    10.5-15  

 

 Platelet Count  349 10^3/uL    150-450  

 

 Mean Platelet Volume  7 um3  Low  7.4-10.4  

 

 Abs Neutrophils  20.9 10^3/uL  High  1.5-7.7  

 

 Abs Lymphocytes  1.1 10^3/uL    1.0-4.8  

 

 Abs Monocytes  1.0 10^3/uL  High  0-0.8  

 

 Abs Eosinophils  0 10^3/uL    0-0.6  

 

 Abs Basophils  0.1 10^3/uL    0-0.2  

 

 Abs Nucleated RBC  0 10^3/uL      

 

 Granulocyte %  90.4 %  High  38-83  

 

 Lymphocyte %  4.9 %  Low  25-47  

 

 Monocyte %  4.4 %    1-9  

 

 Eosinophil %  0 %    0-6  

 

 Basophil %  0.3 %    0-2  

 

 Nucleated Red Blood Cells %  0      









 Comp Metabolic Panel  10/31/2014  Sodium  133 mmol/L    133-145  









 Potassium  4.3 mmol/L    3.7-5.6  

 

 Chloride  96 mmol/L  Low  101-111  

 

 Co2 Carbon Dioxide  28 mmol/L    22-32  

 

 Anion Gap  9 mmol/L    2-11  

 

 Glucose  206 mg/dL  High    

 

 Blood Urea Nitrogen  17 mg/dL    6-24  

 

 Creatinine  0.89 mg/dL    0.51-0.95  

 

 BUN/Creatinine Ratio  19.1    8-20  

 

 Calcium  9.2 mg/dL    8.6-10.3  

 

 Total Protein  7.3 g/dL    6.4-8.9  

 

 Albumin  3.8 g/dL    3.2-5.2  

 

 Globulin  3.5 g/dL    2-4  

 

 Albumin/Globulin Ratio  1.1    1-3  

 

 Total Bilirubin  0.60 mg/dL    0.2-1.0  

 

 Alkaline Phosphatase  83 U/L      

 

 Alt  13 U/L    7-52  

 

 Ast  16 U/L    13-39  

 

 Egfr Non-  65.6    >60  

 

 Egfr   84.4    >60  45









 Inr/Protime  10/31/2014  Inr  1.15  High  0.85-1.06  

 

 Laboratory test finding  10/31/2014  Activated Partial  42.9 seconds  High  
24.0-36.1  



     Thrombo Time        









 Lactic Acid  1.9 mmol/L    0.5-2.2  









 Arterial Blood Gas  10/09/2014  PH Arterial  7.37    7.35-7.45  46









 Pco2 Arterial  48 mmHg  High  35-45  46

 

 Po2 Arterial  59 mmHg  Low    46

 

 O2 Saturation Arterial  90.3 %  Low  95-98  46

 

 Base Excess Arterial  1.7    -2.0-2.0  46, 47

 

 Hco3 Arterial  26.0 mmol/L    19-31  46









 Order  10/06/2014  EKG  <pending>      

 

 Lipid Profile (Trig/Chol/HDL)  2014  Triglycerides  172 mg/dL      48









 Cholesterol  218 mg/dL      49

 

 HDL Cholesterol  39.3 mg/dL      50

 

 LDL Cholesterol  144 mg/dL      51









 Hepatitis B Helen AB  2014  Hepatitis B Surface AB  Nonreactive    
Nonreactive  



 Titer            









 Hep B Surf AB Level  < 3.10 mIU/mL    <12  52









 CBC Auto Diff  2014  White Blood Count  7.5 10^3/uL    4.8-10.8  









 Red Blood Count  4.07 10^6/uL    4.0-5.4  

 

 Hemoglobin  12.9 g/dL    12.0-16.0  

 

 Hematocrit  38 %    35-47  

 

 Mean Corpuscular Volume  94 fL    80-97  

 

 Mean Corpuscular Hemoglobin  32 pg  High  27-31  

 

 Mean Corpuscular HGB Conc  34 g/dL    31-36  

 

 Red Cell Distribution Width  14 %    10.5-15  

 

 Platelet Count  338 10^3/uL    150-450  

 

 Mean Platelet Volume  7 um3  Low  7.4-10.4  

 

 Abs Neutrophils  5.0 10^3/uL    1.5-7.7  

 

 Abs Lymphocytes  1.7 10^3/uL    1.0-4.8  

 

 Abs Monocytes  0.6 10^3/uL    0-0.8  

 

 Abs Eosinophils  0.2 10^3/uL    0-0.6  

 

 Abs Basophils  0.1 10^3/uL    0-0.2  

 

 Abs Nucleated RBC  0 10^3/uL      

 

 Granulocyte %  66.3 %    38-83  

 

 Lymphocyte %  23.3 %  Low  25-47  

 

 Monocyte %  7.5 %    1-9  

 

 Eosinophil %  2.2 %    0-6  

 

 Basophil %  0.7 %    0-2  

 

 Nucleated Red Blood Cells %  0      









 Laboratory test finding  2014  Hemoglobin A1c  6.7    5-7  

 

 Lipid Profile (Trig/Chol/HDL)  2014  Triglycerides  357 mg/dL      53, 54









 Cholesterol  217 mg/dL      53, 55

 

 HDL Cholesterol  42.4 mg/dL      53, 56

 

 LDL Cholesterol  103 mg/dL      53, 57









 Laboratory test  2014  TSH (Thyroid  2.11 IU/mL    0.34-5.60  53, 58



 finding    Stimulating Horm)        

 

 Laboratory test  2014  Hemoglobin A1c  6.9    5-7  



 finding            

 

 Urinalysis  2014  Urine Color  Yellow      









 Urine Appearance  Clear      

 

 Urine Specific Gravity  1.005  Low  1.010-1.030  

 

 Urine Esterase  Negative    Negative  

 

 Urine Nitrate  Negative    Negative  

 

 Urine Urobilinogen  Negative E.U./dL    Negative  

 

 Urine Protein  Negative mg/dL    Negative  

 

 Urine pH  5.5    5-9  

 

 Urine Blood  Negative    Negative  

 

 Urine Ketones  Negative mg/dL    Negative  

 

 Urine Bilirubin  Negative    Negative  

 

 Urine Glucose  Negative mg/dL    Negative  









 Urine Culture And Sensitivities  2014  Urine Culture  (SEE NOTE)      59

 

 Blood Culture  2014  Blood Culture  (SEE NOTE)      60

 

 Laboratory test finding  2014  Lactic Acid  1.2 mmol/L    0.5-2.2  









 Blood Culture  (SEE NOTE)      61









 Rapid Influenza A B  2014  Rapid Influenza A   B  (SEE NOTE)      62



 Antigen    Antigen        

 

 CBC Auto Diff  2014  White Blood Count  9.5 10^3/uL    4.8-10.8  









 Red Blood Count  4.20 10^6/uL    4.0-5.4  

 

 Hemoglobin  12.7 g/dL    12.0-16.0  

 

 Hematocrit  39 %    35-47  

 

 Mean Corpuscular Volume  93 fL    80-97  

 

 Mean Corpuscular Hemoglobin  30 pg    27-31  

 

 Mean Corpuscular HGB Conc  33 g/dL    31-36  

 

 Red Cell Distribution Width  14 %    10.5-15  

 

 Platelet Count  328 10^3/uL    150-450  

 

 Mean Platelet Volume  8 um3    7.4-10.4  

 

 Abs Neutrophils  6.1 10^3/uL    1.5-7.7  

 

 Abs Lymphocytes  2.6 10^3/uL    1.0-4.8  

 

 Abs Monocytes  0.6 10^3/uL    0-0.8  

 

 Abs Eosinophils  0.2 10^3/uL    0-0.6  

 

 Abs Basophils  0.1 10^3/uL    0-0.2  

 

 Abs Nucleated RBC  0.01 10^3/uL      

 

 Granulocyte %  64.0 %    38-83  

 

 Lymphocyte %  27.1 %    25-47  

 

 Monocyte %  6.3 %    1-9  

 

 Eosinophil %  1.7 %    0-6  

 

 Basophil %  0.9 %    0-2  

 

 Nucleated Red Blood Cells %  0.1      









 Comp Metabolic Panel  2014  Sodium  136 mmol/L    133-145  









 Potassium  4.3 mmol/L    3.7-5.6  

 

 Chloride  100 mmol/L  Low  101-111  

 

 Co2 Carbon Dioxide  28 mmol/L    22-32  

 

 Anion Gap  8 mmol/L    2-11  

 

 Glucose  151 mg/dL  High    

 

 Blood Urea Nitrogen  12 mg/dL    6-24  

 

 Creatinine  0.73 mg/dL    0.51-0.95  

 

 BUN/Creatinine Ratio  16.4    8-20  

 

 Calcium  9.1 mg/dL    8.6-10.3  

 

 Total Protein  6.8 g/dL    6.4-8.9  

 

 Albumin  3.9 g/dL    3.2-5.2  

 

 Globulin  2.9 g/dL    2-4  

 

 Albumin/Globulin Ratio  1.3    1-3  

 

 Total Bilirubin  0.70 mg/dL    0.2-1.0  

 

 Alkaline Phosphatase  76 U/L      

 

 Alt  20 U/L    7-52  

 

 Ast  16 U/L    13-39  

 

 Egfr Non-  82.8    >60  

 

 Egfr   106.4    >60  63









 Inr/Protime  2014  Inr  0.91    0.85-1.06  

 

 Laboratory test finding  2014  Activated Partial  28.5 seconds    24.0-
36.1  



     Thrombo Time        

 

 Laboratory test finding  2013  TSH (Thyroid  3.58 miu/mL    0.34-5.60  64



     Stimulating Horm)        

 

 Urine Microalbumin  2013  Ur Microalbumin (mg/L)  23.0 mg/L      65



 Random            









 Urine Creatinine  160.2 mg/dL      

 

 Urine Microalbumin/Creatinine  14.4    Less Than 31  









 Lipid Profile (Trig/Chol/HDL)  2013  Triglycerides  751 mg/dL  High  40-
200  









 Cholesterol  271 mg/dL  High  Less than 200  

 

 HDL Cholesterol  50 mg/dL    40-60  66

 

 Cholesterol/HDL Ratio  5.4 Average  High  1-4.44  

 

 LDL Cholesterol  (SEE NOTE)    Less Than 100  67









 Comp Metabolic Panel  2013  Sodium  137 mmol/L    133-145  









 Potassium  4.3 mmol/L    3.5-5.0  

 

 Chloride  102 mmol/L    101-111  

 

 Co2 Carbon Dioxide  27.0 mmol/L    22-32  

 

 Anion Gap  8.0 mmol/L    2-11  

 

 Glucose  169 mg/dL  High    

 

 Blood Urea Nitrogen  9 mg/dL    6-24  

 

 Creatinine  0.70 mg/dL    0.50-1.40  

 

 BUN/Creatinine Ratio  12.9    8-20  

 

 Calcium  9.0 mg/dL    8.1-9.9  

 

 Total Protein  6.2 g/dL    6.2-8.1  

 

 Albumin  3.6 g/dL    3.6-5.4  

 

 Globulin  2.6 g/dL    2-4  

 

 Albumin/Globulin Ratio  1.4    1-3  

 

 Total Bilirubin  0.8 mg/dL    0.4-1.5  

 

 Alkaline Phosphatase  61 U/L      

 

 Alt  40 U/L    14-54  

 

 Ast  32 U/L    12-42  

 

 Egfr Non-  86.9    >60  

 

 Egfr   111.7    >60  68









 Laboratory test finding  2013  Hemoglobin A1c  7.2  High  5-7  

 

 Laboratory test finding  10/03/2010  Hemoglobin A1c  5.8 %    Less Than 6.0  69
, 70









 1  : [id number]

 

 2  SEE RESULT BELOW



   -----------------------------------------------------------------------------
---------------



   Name:  YSABEL VAZQUEZ                    : 1958    Attend Dr: Ruddy Herzog MD



   Acct:  [id number]  Unit: [id number]  AGE: 59            [facility]  [facility]



   Re17                        SEX: F             Status:    REG ER



   -----------------------------------------------------------------------------
---------------



   



   SPEC: [id number]         ALLIE:   17-    SUBM DR: Ruddy Herzog MD



   REQ:  [id number]              RECD:   



   STATUS: COMP             OTHR DR: Sushma Hameed MD



   _



   SOURCE: [facility]     [facility]:



   ORDERED:  Flu A B Request



   



   -----------------------------------------------------------------------------
---------------



   Procedure                         Result                         Reported   
        Site



   -----------------------------------------------------------------------------
---------------



   Rapid Influenza A   B Request  Final                             1549      ML



   Specimen received for Influenza A/B Molecular testing



   



   -----------------------------------------------------------------------------
---------------



   * ML - MAIN LAB ([facility])



   .



   



   



   



   



   



   



   



   



   



   



   



   



   



   



   



   



   



   



   



   



   



   



   



   



   



   



   



   ** END OF REPORT **



   



   * ML=Testing performed at Main Lab



   DEPARTMENT OF PATHOLOGY,  [address] [place] [address]



   Phone # [phone]      Fax #[phone]



   Elvis Mccray M.D. Director     [facility] # [id number]

 

 3  FASTING

 

 4  Therapeutic target for the treatment of diabetes



   [facility] patients is <7% HBA1C, and in selective



   patients <6.0%.Please refer to American Diabetes



   Association Diabetic care guidelines for further



   information.

 

 5  Desirable <150



   Borderline high 150-199



   High 200-499



   Very High >500

 

 6  Desirable <200



   Borderline high 200-239



   High >239

 

 7  Low <40



   Desirable: 40-60



   High: >60

 

 8  Desirable: <100 mg/dL



   Near Optimal: 100-129 mg/dL



   Borderline High: 130-159 mg/dL



   High: 160-189 mg/dL



   Very High: >189 mg/dL

 

 9  Unable to calculate due to low microalbumin

 

 10  Desirable <150



   Borderline high 150-199



   High 200-499



   Very High >500

 

 11  Desirable <200



   Borderline high 200-239



   High >239

 

 12  Low <40



   Desirable: 40-60



   High: >60

 

 13  Desirable: <100 mg/dL



   Near Optimal: 100-129 mg/dL



   Borderline High: 130-159 mg/dL



   High: 160-189 mg/dL



   Very High: >189 mg/dL

 

 14  Therapeutic target for the treatment of diabetes



   [facility] patients is <7% HBA1C, and in selective



   patients <6.0%.Please refer to American Diabetes



   Association Diabetic care guidelines for further



   information.

 

 15  **Please note:



   The following may produce a false positive D Dimer test:



   - Rheumatoid factor greater than 60 IU/ml



   - Plasma hemoglobin greater than 0.05 gm/dl



   - Bilirubin greater than 50 mg/dl



   - Lipids greater than 1000 mg/dl



   - FDP greater than 20 ug/ml

 

 16  [facility] Severe Sepsis and Septic Shock Management Bundle Measure



   requires all lactic acids initially measuring >2.0 mmol/L be



   repeated.

 

 17  Reference Range and Interpretation:



   TnI (ng/mL)             Interpretation



   Less Than 0.03 ng/mL    Not supportive of diagnosis of MI



   0.03 - 0.50 ng/mL       Indeterminate: suggest serial



   studies if clinically indicated.



   Greater than 0.5 ng/mL  Consistent with diagnosis of MI

 

 18  *******Because ethnic data is not always readily available,



   this report includes an eGFR for both -Americans and



   non- Americans.****



   The National Kidney Disease Education Program (NKDEP) does



   not endorse the use of the MDRD equation for patients that



   are not between the ages of 18 and 70, are pregnant, have



   extremes of body size, muscle mass, or nutritional status,



   or are non- or non-.



   According to the National Kidney Foundation, irrespective of



   diagnosis, the stage of the disease is based on the level of



   kidney function:



   Stage Description                      GFR(mL/min/1.73 m(2))



   1     Kidney damage with normal or decreased GFR       90



   2     Kidney damage with mild decrease in GFR          60-89



   3     Moderate decrease in GFR                         30-59



   4     Severe decrease in GFR                           15-29



   5     Kidney failure                       <15 (or dialysis)

 

 19  Acute inflammation:  >10.00

 

 20  [id number]

 

 21  The high-risk HPV types detected by the assay include: 16,



   18, 31, 33, 35, 39, 45, 51, 52, 56, 58, 59, 66, and 68.

 

 22  SEE RESULT BELOW



   -----------------------------------------------------------------------------
---------------



   Name:  YSABEL VAZQUEZ                    : 1958    Attend Dr: Reji Garza MD



   Acct:  [id number]  Unit: [id number]  AGE: 57            Location:  [facility]



   Re16                        SEX: F             Status:    REG REF



   -----------------------------------------------------------------------------
---------------



   



   SPEC: [id number]            ALLIE: 16-30      [facility] DR: Reji Garza MD



   REQ:  [id number]             RECD: 



   STATUS: GUILLERMINA HUGGINS DR: Alexander Hameed MD



   _



   ORDERED:  IMAGE ANALYSIS, HPV/Thin Prep, HPV 16/18 GENE



   COMMENTS: [id number]



   



   FINAL DIAGNOSIS



   



   Negative for Intraepithelial lesion or Malignancy



   A. Vaginal



   Specimen Adequacy:



   Satisfactory of evaluation



   Patient Information:



   HPV: High risk HPV RNA testing regardless of pap results.



   HPV 16/18 Genotype Reflex



   Actual Specimen Date:         16



   LMP If Unknown:               



   Pregnant?:     N



   Post Menopausal?:             Y



   Hysterectomy?:                Y



   Previous Abnormal Pap Smears?:N



   Other Pertinent History:      Hysterectomy for complex atypical 
hyperplasia.



   Vaginal cuff post radiation.



   



   -----------------------------------------------------------------------------
---------------



   Date     Time Test            Result   Flag (u) Normal Range



   16 0930 HPV RNA RFLX GE Negative          Negative



   



   The high-risk HPV types detected by the assay include: 16,



   18, 31, 33, 35, 39, 45, 51, 52, 56, 58, 59, 66, and 68.



   -----------------------------------------------------------------------------
---------------



   



   



   Signed __________(signature on file)___________ SIENA Galarza (ASC)  0925



   



   This Pap test was evaluated with the assistance of the [employer] Test Imaging 
System. Due to



   cytologic findings at the imager microscope, comprehensive manual 
rescreening by a



   Cytotechnologist may be required.



   The Pap Smear is a screening test designed to aid in the detection of 
premalignant and



   malignant conditions of the uterine cervix.  It is not a diagnostic 
procedure and should



   not be used as the sole means of detecting cervical cancer.  Both false-
positive and false-



   negative reports do occur.  Depending on your risk status, a Pap smear 
should be obtained



   and evaluated every 1-3 years.



   



   -----------------------------------------------------------------------------
---------------



   



   ** END OF REPORT **



   



   * ML=Testing performed at Main Lab



   DEPARTMENT OF PATHOLOGY,  [address] [place] [address]



   



   



   RUN DATE: 16               [facility] **LIVE**         
       PAGE    1



   



   -----------------------------------------------------------------------------
---------------



   Patient: YSABEL VAZQUEZ                     [id number]     (Continued)



   -----------------------------------------------------------------------------
---------------



   



   Phone # [phone]      Fax #[phone]



   Elvis Mccray M.D. Director     [facility] # [id number]

 

 23  *******Because ethnic data is not always readily available,



   this report includes an eGFR for both -Americans and



   non- Americans.****



   The National Kidney Disease Education Program (NKDEP) does



   not endorse the use of the MDRD equation for patients that



   are not between the ages of 18 and 70, are pregnant, have



   extremes of body size, muscle mass, or nutritional status,



   or are non- or non-.



   According to the National Kidney Foundation, irrespective of



   diagnosis, the stage of the disease is based on the level of



   kidney function:



   Stage Description                      GFR(mL/min/1.73 m(2))



   1     Kidney damage with normal or decreased GFR       90



   2     Kidney damage with mild decrease in GFR          60-89



   3     Moderate decrease in GFR                         30-59



   4     Severe decrease in GFR                           15-29



   5     Kidney failure                       <15 (or dialysis)

 

 24  **Please note:



   The following may produce a false positive D Dimer test:



   - Rheumatoid factor greater than 60 IU/ml



   - Plasma hemoglobin greater than 0.05 gm/dl



   - Bilirubin greater than 50 mg/dl



   - Lipids greater than 1000 mg/dl



   - FDP greater than 20 ug/ml

 

 25  [facility] Severe Sepsis and Septic Shock Management Bundle Measure



   requires all lactic acids initially measuring >2.0mmol/L be



   repeated.

 

 26  *******Because ethnic data is not always readily available,



   this report includes an eGFR for both -Americans and



   non- Americans.****



   The National Kidney Disease Education Program (NKDEP) does



   not endorse the use of the MDRD equation for patients that



   are not between the ages of 18 and 70, are pregnant, have



   extremes of body size, muscle mass, or nutritional status,



   or are non- or non-.



   According to the National Kidney Foundation, irrespective of



   diagnosis, the stage of the disease is based on the level of



   kidney function:



   Stage Description                      GFR(mL/min/1.73 m(2))



   1     Kidney damage with normal or decreased GFR       90



   2     Kidney damage with mild decrease in GFR          60-89



   3     Moderate decrease in GFR                         30-59



   4     Severe decrease in GFR                           15-29



   5     Kidney failure                       <15 (or dialysis)

 

 27  Reference Range and Interpretation:



   TnI (ng/mL)             Interpretation



   Less Than 0.03 ng/mL    Not supportive of diagnosis of MI



   0.03 - 0.50 ng/mL       Indeterminate: suggest serial



   studies if clinically indicated.



   Greater than 0.5 ng/mL  Consistent with diagnosis of MI

 

 28  Effective immediately, due to a laboratory mean normal



   Protime change, the reference range for the INR has changed.

 

 29  *******Because ethnic data is not always readily available,



   this report includes an eGFR for both -Americans and



   non- Americans.****



   The National Kidney Disease Education Program (NKDEP) does



   not endorse the use of the MDRD equation for patients that



   are not between the ages of 18 and 70, are pregnant, have



   extremes of body size, muscle mass, or nutritional status,



   or are non- or non-.



   According to the National Kidney Foundation, irrespective of



   diagnosis, the stage of the disease is based on the level of



   kidney function:



   Stage Description                      GFR(mL/min/1.73 m(2))



   1     Kidney damage with normal or decreased GFR       90



   2     Kidney damage with mild decrease in GFR          60-89



   3     Moderate decrease in GFR                         30-59



   4     Severe decrease in GFR                           15-29



   5     Kidney failure                       <15 (or dialysis)

 

 30  FASTING 12 HOUR

 

 31  FASTING 12 HOUR

 

 32  FASTING 12 HOUR

 

 33  FASTING 12 HOUR

 

 34  Desirable <150



   Borderline high 150-199



   High 200-499



   Very High >500

 

 35  Desirable <200



   Borderline high 200-239



   High >239

 

 36  Low <40



   Desirable: 40-60



   High: >60

 

 37  Desirable: <100 mg/dL



   Near Optimal: 100-129 mg/dL



   Borderline High: 130-159 mg/dL



   High: 160-189 mg/dL



   Very High: >189 mg/dL

 

 38  *******Because ethnic data is not always readily available,



   this report includes an eGFR for both -Americans and



   non- Americans.****



   The National Kidney Disease Education Program (NKDEP) does



   not endorse the use of the MDRD equation for patients that



   are not between the ages of 18 and 70, are pregnant, have



   extremes of body size, muscle mass, or nutritional status,



   or are non- or non-.



   According to the National Kidney Foundation, irrespective of



   diagnosis, the stage of the disease is based on the level of



   kidney function:



   Stage Description                      GFR(mL/min/1.73 m(2))



   1     Kidney damage with normal or decreased GFR       90



   2     Kidney damage with mild decrease in GFR          60-89



   3     Moderate decrease in GFR                         30-59



   4     Severe decrease in GFR                           15-29



   5     Kidney failure                       <15 (or dialysis)

 

 39  Acute inflammation:  >10.00

 

 40  RUN DATE: 02/08/15               [facility] **LIVE**       
         PAGE    1



   RUN TIME: 831             [address] [place] [address]   [address]



   Specimen Inquiry



   



   -----------------------------------------------------------------------------
---------------



   Name:  YSABEL VAZQUEZ                    : 1958    Attend Dr: Ruddy Moser MD



   Acct:  [id number]  Unit: [id number]  AGE: 56            [facility]:  [facility]



   Re/03/15                        SEX: F             Status:    DEP ER



   -----------------------------------------------------------------------------
---------------



   



   SPEC: [id number]         ALLIE:   02/03/    [facility] DR: Ashley BALDWIN



   REQ:  [id number]              RECD:   02/03/



   STATUS: COMP             OTHR DR: Sushma Moser MD



   _



   SOURCE: BLOOD,VENO     SPDES:



   ORDERED:  Blood Cult



   



   -----------------------------------------------------------------------------
---------------



   Procedure                         Result                         Verified   
        Site



   -----------------------------------------------------------------------------
---------------



   Aerobic Culture Bottle  Final                                    02/08/15-
0831      ML



   No Growth Day 5



   



   Anaerobic Culture Bottle  Final                                  02/08/15-
0831      ML



   No Growth Day 5



   



   -----------------------------------------------------------------------------
---------------



   



   



   



   



   



   



   



   



   



   



   



   



   



   



   



   



   



   



   



   



   



   



   



   



   



   ** END OF REPORT **



   



   * ML=Testing performed at Main Lab



   DEPARTMENT OF PATHOLOGY,  [address] [place] [address]



   Phone # [phone]      Fax #[phone]



   Elvis Mccray M.D. Director     [facility] # [id number]

 

 41  *******Because ethnic data is not always readily available,



   this report includes an eGFR for both -Americans and



   non- Americans.****



   The National Kidney Disease Education Program (NKDEP) does



   not endorse the use of the MDRD equation for patients that



   are not between the ages of 18 and 70, are pregnant, have



   extremes of body size, muscle mass, or nutritional status,



   or are non- or non-.



   According to the National Kidney Foundation, irrespective of



   diagnosis, the stage of the disease is based on the level of



   kidney function:



   Stage Description                      GFR(mL/min/1.73 m(2))



   1     Kidney damage with normal or decreased GFR       90



   2     Kidney damage with mild decrease in GFR          60-89



   3     Moderate decrease in GFR                         30-59



   4     Severe decrease in GFR                           15-29



   5     Kidney failure                       <15 (or dialysis)

 

 42  RUN DATE: 02/08/15               [facility] **LIVE**       
         PAGE    1



   RUN TIME: 825             [address] [place] [address]   [address]



   Specimen Inquiry



   



   -----------------------------------------------------------------------------
---------------



   Name:  YSABEL VAZQUEZ                    : 1958    Attend Dr: Ruddy Moser MD



   Acct:  [id number]  Unit: [id number]  AGE: 56            Location:  [facility]



   Re/03/15                        SEX: F             Status:    DEP ER



   -----------------------------------------------------------------------------
---------------



   



   SPEC: [id number]         ALLIE:   02/03/    [facility] DR: Ashley BALDWIN



   REQ:  [id number]              RECD:   02/03/



   STATUS: COMP             OTHR DR: Sushma Moser MD



   _



   SOURCE: BLOOD,VENO     SPDES:



   ORDERED:  Blood Cult



   



   -----------------------------------------------------------------------------
---------------



   Procedure                         Result                         Verified   
        Site



   -----------------------------------------------------------------------------
---------------



   Aerobic Culture Bottle  Final                                    02/08/15-
825      ML



   No Growth Day 5



   



   Anaerobic Culture Bottle  Final                                  02/08/15-
825      ML



   No Growth Day 5



   



   -----------------------------------------------------------------------------
---------------



   



   



   



   



   



   



   



   



   



   



   



   



   



   



   



   



   



   



   



   



   



   



   



   



   



   ** END OF REPORT **



   



   * ML=Testing performed at Main Lab



   DEPARTMENT OF PATHOLOGY,  [facility] [address] [place], [place] [address]



   Phone # [phone]      Fax #[phone]



   Elvis Mccray M.D. Director     [place] # [id number]

 

 43  RUN DATE: 02/03/15               [facility] **LIVE**       
         PAGE    1



   RUN TIME: 829             [facility] [place], [place]   [address]



   Specimen Inquiry



   



   -----------------------------------------------------------------------------
---------------



   Name:  YSABEL VAZQUEZ                    : 1958    Attend Dr: Ruddy Moser MD



   Acct:  [id number]  Unit: [id number]  AGE: 56            Location:  [facility]



   Re/03/15                        SEX: F             Status:    REG ER



   -----------------------------------------------------------------------------
---------------



   



   SPEC: [id number]         ALLIE:   02/03/    [facility] DR: Ashley BALDWIN



   REQ:  [id number]              RECD:   02/03/



   STATUS: COMP             OTHR DR: Sushma Moser MD



   _



   SOURCE: QUITA     SPDES:



   ORDERED:  Rapid Flu A B



   



   -----------------------------------------------------------------------------
---------------



   Procedure                         Result                         Verified   
        Site



   -----------------------------------------------------------------------------
---------------



   Rapid Influenza A   B Antigen  Final                             02/03/15-
0829      ML



   



   Organism 1                     Negative Influenza A B



   



   Antigen testing by enzyme immunoassay.



   



   Cell culture testing can be performed to confirm



   negative test results and to assist in detecting other



   viruses that can produce similar clinical symptoms.



   Please notify Microbiology [facility] if further testing is



   desired.



   



   -----------------------------------------------------------------------------
---------------



   



   



   



   



   



   



   



   



   



   



   



   



   



   



   



   



   



   



   



   ** END OF REPORT **



   



   * ML=Testing performed at Main Lab



   DEPARTMENT OF PATHOLOGY,  [address] [place] [address]



   Phone # [phone]      Fax #[phone]



   Elvis Mccray M.D. Director     [facility] # [id number]

 

 44  *******Because ethnic data is not always readily available,



   this report includes an eGFR for both -Americans and



   non- Americans.****



   The National Kidney Disease Education Program (NKDEP) does



   not endorse the use of the MDRD equation for patients that



   are not between the ages of 18 and 70, are pregnant, have



   extremes of body size, muscle mass, or nutritional status,



   or are non- or non-.



   According to the National Kidney Foundation, irrespective of



   diagnosis, the stage of the disease is based on the level of



   kidney function:



   Stage Description                      GFR(mL/min/1.73 m(2))



   1     Kidney damage with normal or decreased GFR       90



   2     Kidney damage with mild decrease in GFR          60-89



   3     Moderate decrease in GFR                         30-59



   4     Severe decrease in GFR                           15-29



   5     Kidney failure                       <15 (or dialysis)

 

 45  *******Because ethnic data is not always readily available,



   this report includes an eGFR for both -Americans and



   non- Americans.****



   The National Kidney Disease Education Program (NKDEP) does



   not endorse the use of the MDRD equation for patients that



   are not between the ages of 18 and 70, are pregnant, have



   extremes of body size, muscle mass, or nutritional status,



   or are non- or non-.



   According to the National Kidney Foundation, irrespective of



   diagnosis, the stage of the disease is based on the level of



   kidney function:



   Stage Description                      GFR(mL/min/1.73 m(2))



   1     Kidney damage with normal or decreased GFR       90



   2     Kidney damage with mild decrease in GFR          60-89



   3     Moderate decrease in GFR                         30-59



   4     Severe decrease in GFR                           15-29



   5     Kidney failure                       <15 (or dialysis)

 

 46  Verbal to [id number] by [id number] at 1540 on 10/09/14.~Results read back 
accurately.

 

 47  Reference ranges based on room air.

 

 48  Desirable <150



   Borderline high 150-199



   High 200-499



   Very High >500

 

 49  Desirable <200



   Borderline high 200-239



   High >239

 

 50  Low <40



   Desirable: 40-60



   High: >60

 

 51  Desirable <100



   Near Optimal 100-129



   Borderline high 130-159



   High 160-189



   Very High >189

 

 52  This assay does not differentiate between reactivity due to



   a vaccine-induced immune response or an immune response



   induced by infection with HBV.

 

 53  FASTING

 

 54  Desirable <150



   Borderline high 150-199



   High 200-499



   Very High >500

 

 55  Desirable <200



   Borderline high 200-239



   High >239

 

 56  Low <40



   Desirable: 40-60



   High: >60

 

 57  Desirable <100



   Near Optimal 100-129



   Borderline high 130-159



   High 160-189



   Very High >189

 

 58  FASTING

 

 59  RUN DATE: 14               [facility] **LIVE**       
         PAGE    1



   RUN TIME: 1012             [address] [place], [place]   [address]



   Specimen Inquiry



   



   -----------------------------------------------------------------------------
---------------



   Name:  YSABEL VAZQUEZ                    : 1958    Attend Dr: Myke Mazariegos MD



   Acct:  [id number]  Unit: [id number]  AGE: 55            Location:  [facility]



   Re14                        SEX: F             Status:    DEP ER



   -----------------------------------------------------------------------------
---------------



   



   SPEC: [id number]         ALLIE:       [facility] DR: Myke Mazariegos MD



   REQ:  [id number]              RECD:   



   STATUS: COMP             OTHR DR: Sushma Hameed MD



   _



   SOURCE: URINE          SPDESC:



   ORDERED:  Urine Culture



   



   -----------------------------------------------------------------------------
---------------



   Procedure                         Result                         Verified   
        Site



   -----------------------------------------------------------------------------
---------------



   Urine Culture  Final                                             14-
1011      ML



   



   Mixed sugar; possible contamination. Suggest



   resubmission.



   



   



   -----------------------------------------------------------------------------
---------------



   



   



   



   



   



   



   



   



   



   



   



   



   



   



   



   



   



   



   



   



   



   



   



   



   



   



   ** END OF REPORT **



   



   * ML=Testing performed at Main Lab



   DEPARTMENT OF PATHOLOGY,  [address] [place] [address]



   Phone # [phone]      Fax #[phone]



   Elvis Mccray M.D. Director     [place] [employer]  #[id number]

 

 60  RUN DATE: 14               [facility] **LIVE**       
         PAGE    1



   RUN TIME: 0827             [address] [place] [address]   [address]



   Specimen Inquiry



   



   -----------------------------------------------------------------------------
---------------



   Name:  YSABEL VAZQUEZ                    : 1958    Attend Dr: Myke Mazariegos MD



   Acct:  [id number]  Unit: [id number]  AGE: 55            Location:  [facility]



   Re14                        SEX: F             Status:    DEP ER



   -----------------------------------------------------------------------------
---------------



   



   SPEC: [id number]         ALLIE:       [facility] DR: Myke Mazariegos MD



   REQ:  [id number]              RECD:   



   STATUS: COMP             OTHR DR: Sushma Hameed MD



   _



   SOURCE: BLOOD,VENO     SPDESC:



   ORDERED:  Blood Cult



   



   -----------------------------------------------------------------------------
---------------



   Procedure                         Result                         Verified   
        Site



   -----------------------------------------------------------------------------
---------------



   Aerobic Culture Bottle  Final                                    14-
0850      ML



   No Growth Day 5



   



   Anaerobic Culture Bottle  Final                                  14-
0850      ML



   No Growth Day 5



   



   -----------------------------------------------------------------------------
---------------



   



   



   



   



   



   



   



   



   



   



   



   



   



   



   



   



   



   



   



   



   



   



   



   



   



   



   ** END OF REPORT **



   



   * ML=Testing performed at Main Lab



   DEPARTMENT OF PATHOLOGY,  [facility] [place] [place], [place] [address]



   Phone # [phone]      Fax #[phone]



   Elvis Mccray M.D. Director     [place] [employer]  #[id number]

 

 61  RUN DATE: 14               [facility] **LIVE**       
         PAGE    1



   RUN TIME: 0850             [facility] [place], [place]   [address]



   Specimen Inquiry



   



   -----------------------------------------------------------------------------
---------------



   Name:  YSABEL VAZQUEZ                    : 1958    Attend Dr: Myke Mazariegos MD



   Acct:  [id number]  Unit: [id number]  AGE: 55            Location:  [facility]



   Re14                        SEX: F             Status:    DEP ER



   -----------------------------------------------------------------------------
---------------



   



   SPEC: [id number]         ALLIE:       [facility] DR: Myke Mazariegos MD



   REQ:  [id number]              RECD:   



   STATUS: COMP             [facility] DR: Sushma Hameed MD



   _



   SOURCE: BLOOD,VENO     SPDES:



   ORDERED:  Blood Cult



   



   -----------------------------------------------------------------------------
---------------



   Procedure                         Result                         Verified   
        Site



   -----------------------------------------------------------------------------
---------------



   Aerobic Culture Bottle  Final                                    14-
0850      ML



   No Growth Day 5



   



   Anaerobic Culture Bottle  Final                                  14-
0850      ML



   No Growth Day 5



   



   -----------------------------------------------------------------------------
---------------



   



   



   



   



   



   



   



   



   



   



   



   



   



   



   



   



   



   



   



   



   



   



   



   



   



   



   ** END OF REPORT **



   



   * ML=Testing performed at Main Lab



   DEPARTMENT OF PATHOLOGY,  [address] [place] [address]



   Phone # [phone]      Fax #[phone]



   Elvis Mccray M.D. Director     [place] Permit  #[id number]

 

 62  RUN DATE: 14               [facility] **LIVE**       
         PAGE    1



   RUN TIME: 3700             [address] [place] [address]   [id number]



   Specimen Inquiry



   



   -----------------------------------------------------------------------------
---------------



   Name:  GEORGEYSABEL                    : 1958    Attend Dr: Myke Mazariegos MD



   Acct:  [id number]  Unit: [id number]  AGE: 55            Location:  ED



   Re14                        SEX: F             Status:    REG ER



   -----------------------------------------------------------------------------
---------------



   



   SPEC: [id number]         ALLIE:       [facility] DR: Brad Huggins MD



   REQ:  [id number]              RECD:   



   STATUS: COMP             OTHR DR: [facility]



   Sushma Hameed MD



   _



   SOURCE: QUITA WATSONESC:



   ORDERED:  Rapid Flu A B



   



   -----------------------------------------------------------------------------
---------------



   Procedure                         Result                         Verified   
        Site



   -----------------------------------------------------------------------------
---------------



   Rapid Influenza A   B Antigen  Final                             14-
0750      ML



   



   Organism 1                     Negative Influenza A B



   



   Antigen testing by enzyme immunoassay.



   



   Cell culture testing can be performed to confirm



   negative test results and to assist in detecting other



   viruses that can produce similar clinical symptoms.



   Please notify Microbiology Lab if further testing is



   desired.



   



   -----------------------------------------------------------------------------
---------------



   



   



   



   



   



   



   



   



   



   



   



   



   



   



   



   



   



   



   



   ** END OF REPORT **



   



   * ML=Testing performed at Main Lab



   DEPARTMENT OF PATHOLOGY,  [address] [place] [address]



   Phone # [phone]      Fax #[phone]



   Elvis Mccray M.D. Director     [place] Permit  #[id number]

 

 63  *******Because ethnic data is not always readily available,



   this report includes an eGFR for both -Americans and



   non- Americans.****



   The National Kidney Disease Education Program (NKDEP) does



   not endorse the use of the MDRD equation for patients that



   are not between the ages of 18 and 70, are pregnant, have



   extremes of body size, muscle mass, or nutritional status,



   or are non- or non-.



   According to the National Kidney Foundation, irrespective of



   diagnosis, the stage of the disease is based on the level of



   kidney function:



   Stage Description                      GFR(mL/min/1.73 m(2))



   1     Kidney damage with normal or decreased GFR       90



   2     Kidney damage with mild decrease in GFR          60-89



   3     Moderate decrease in GFR                         30-59



   4     Severe decrease in GFR                           15-29



   5     Kidney failure                       <15 (or dialysis)

 

 64  FASTING

 

 65  Microalbuminuria in a random sample is defined as:



   Microalbumin/Creatinine ratio of  ug/mg.

 

 66  HDL Interpretation:



   Undesirable: High Risk:  Less than 40 mg/dL



   Desirable:  Low Risk:  Greater than 60 mg/dL

 

 67  Unable to calculate LDL as triglyceride is > 400

 

 68  *******Because ethnic data is not always readily available,



   this report includes an eGFR for both -Americans and



   non- Americans.****



   The National Kidney Disease Education Program (NKDEP) does



   not endorse the use of the MDRD equation for patients that



   are not between the ages of 18 and 70, are pregnant, have



   extremes of body size, muscle mass, or nutritional status,



   or are non- or non-.



   According to the National Kidney Foundation, irrespective of



   diagnosis, the stage of the disease is based on the level of



   kidney function:



   Stage Description                      GFR(mL/min/1.73 m(2))



   1     Kidney damage with normal or decreased GFR       90



   2     Kidney damage with mild decrease in GFR          60-89



   3     Moderate decrease in GFR                         30-59



   4     Severe decrease in GFR                           15-29



   5     Kidney failure                       <15 (or dialysis)

 

 69  COMMENTS: N

 

 70  THERAPEUTIC TARGET FOR THE TREATMENT OF DIABETES



   MELLITUS PATIENTS IS <7% HBA1C, AND IN SELECTIVE



   PATIENTS <6.0%.  PLEASE REFER TO AMERICAN DIABETES



   ASSOCIATION DIABETIC CARE GUIDELINES FOR FURTHER



   INFORMATION.







Procedures







 Date  CPT Code  Description  Status  Comment

 

 2017  90662  EKG Tracing & Interpretation  Completed  

 

 2016  53804  EKG Tracing & Interpretation  Completed  

 

 2016  97754  Holter Monitoring 24 HR New  Completed  

 

 2016  43702  Holter Monitoring 24 HR New  Completed  

 

 2015    Diabetic Retinal Eye Exam  Completed  

 

 10/09/2014  77079  Spirometry Incl Graphic Record, Timed  Completed  



     Expiratory Flow Rate    

 

 10/06/2014  63743  EKG Tracing & Interpretation  Completed  

 

 2014    Mammogram  Completed  

 

 2014    Diabetic Retinal Eye Exam  Completed  

 

 10/29/2008    Colonoscopy  Completed  

 

 49    Colonoscopy  Completed  per pt ( nl)







Encounters







 Type  Date  Location  Provider  CPT E/M  Dx

 

 Office Visit  2018  [facility]  Lindsey Johnston MD  [address]  
J44.1



   9:15a  [facility]      









 G47.33

 

 E66.01

 

 K21.9









 Office Visit  2017  8:10a  [facility]  Sushma Hameed M.D.  
388[address]  E11.9



     - [facility]      









 E11.9

 

 J44.9

 

 J44.9

 

 D18.03

 

 R93.8

 

 Z91.19

 

 D18.03

 

 Z91.19









 [facility]  2017 [facility]48a  [facility]  [address]
  J44.1



     [facility]  [place]    



     Hospitalists      









 I10

 

 E78.5

 

 G47.33









 Office Visit  2017 11:47a  [facility]  Josy Naylor, N.P.  
[address]  J44.1



     Hospitalists      









 G47.33

 

 I10

 

 E78.5









 Office Visit  2017 11:46a  [facility]  Devin Edil,  
57971  J44.1



     Hospitalists  N.P.    









 G47.33

 

 I10

 

 E78.5









 Office Visit  2017 10:10a  [facility]  Sushma Hameed M.D.  
[address]14  E11.9



     - [place]      









 D18.03

 

 E78.2

 

 E03.8

 

 Z12.31

 

 F32.89









 Office Visit  2017 10:20a  [facility]  Sil[place] Figueredo[place]  
[address]  I47.1



     [facility]  FACC    

 

 Office Visit  10/19/2016  2:15p  [facility]  Iván Lima MD,  
[address]75  E66.01



     [facility]  FACS    









 R19.7









 Office Visit  10/18/2016 10:10a  [facility]  Sushma Hameed M.D.  
96781  K52.9



     - [place]      









 E78.4

 

 E11.9

 

 Z23

 

 F43.20

 

 D18.03

 

 L60.3









 Office Visit  2016 10:00a  [facility]  Lindsey Johnston MD  
23780  J45.909



     [facility]      









 G47.33

 

 E66.01









 Office Visit  2016 10:50a  [facility]  Sushma Hameed  [address]
  L03.114



     - Jorge Luis HERRERA    









 F43.20









 Office Visit  03/15/2016 10:30a  [facility]  Sushma Hameed M.D.  
43695  E11.9



     - Jorge Luis      









 C54.1

 

 E78.2

 

 F43.20









 Office Visit  2016  1:00p  [facility]  [place] Figueredo, [place]  
[address]139  R00.2



     [facility]    









 E11.9

 

 G47.33

 

 E03.8

 

 J44.9

 

 E66.8









 Office Visit  2015 11:50a  [facility]  Sushma Hameed M.D.  
45420  R00.2



     - Jorge Luis      









 B34.9









 Office Visit  2015  9:50a  [facility]  Sushma Hameed  10149
  250.00



     - Jorge Luis HERRERA    









 110.1

 

 V03.82

 

 V04.81

 

 453.82

 

 110.8

 

 300.00









 Office Visit  2015  9:00a  [facility]  Iván Newby M.D.  29771
  493.90



     [facility]      









 327.23









 Office Visit  2015 10:50a  [facility]  Sushma Hameed  10583
  787.02



     - Jorge Luis HERRERA    









 300.00

 

 453.82









 Office Visit  06/10/2015 11:50a  [facility]  Sushma Hameed  [address]310
  453.82



     - Jorge Luis HERRERA    









 729.82

 

 V45.89

 

 285.1

 

 250.00

 

 V58.61









 Office Visit  2015  9:30a  [facility] -  Christopher Salas NP  
[address]103  372.00



     [address]      









 461.8

 

 461.9









 Office Visit  2015 10:10a  [facility]  Sushma Hameed  [address]32
  250.00



     - Jorge Luis HERRERA    









 272.1

 

 691.8

 

 244.8

 

 288.60









 Office Visit  2014 10:00a  [facility]  Iván Newby M.D.  09199
  493.00



     [facility]      









 327.23









 Office Visit  2014  1:50p  [facility]   Sushma Hameed  
15688  682.8



     Jorge Luis HERRERA    

 

 Office Visit  2014  9:39a  [facility],  Luis Felipe Dyson,  
[address]  486



     Hospitalists  M.REYNALDO    









 496

 

 038.9

 

 278.00









 Office Visit  2014  9:38a  [facility],  Luis Felipe Dyson  
81579  486



     Hospitalists  MMANFRED    









 496

 

 038.9

 

 278.00









 Office Visit  10/31/2014  9:37a  [facility],  Devin Solo,  
[address]  486



     Hospitalists  N.P.    









 496

 

 038.9

 

 278.00









 Office Visit  10/09/2014  1:30p  [facility]  Iván Newby M.D.  [address]10
  327.23



     [facility]      









 493.00









 Office Visit  10/06/2014 12:10p  [facility]  Sushma Hameed  [address]
  V72.84



     - Jorge Luis HERRERA    









 627.0

 

 272.1

 

 250.00

 

 327.23

 

 244.8

 

 696.8

 

 V04.81

 

 278.01

 

 493.90









 Office Visit  2014 10:50a  [facility]  Sushma Hameed  [address]853
  250.00



     - Jorge Luis HERRERA    









 493.90

 

 696.8

 

 703.8

 

 627.0

 

 272.1

 

 V03.82









 Office Visit  2014  1:10p  [facility]  Sushma Hameed M.D.  
73425  847.0



     - Jorge Luis      









 847.0









 Office Visit  2014  2:30p  [facility]  Sushma Hameed  67137
  250.00



     - Jorge Luis HERRERA    









 272.1

 

 327.23

 

 244.8

 

 493.90









 Office Visit  2013 11:10a  [facility]  Sushma Hameed  [address]66
  493.90



     - Jorge Luis HERRERA    









 477.9

 

 244.8

 

 250.02









 Office Visit  2013  9:15a  [facility]  Ijeoma Hurt  
815[address]  841.0



     Of [facility]    

 

 Office Visit  2012  8:15a  [facility]  Ijeoma Hurt  
70113  719.44



     Of [facility]    







Plan of Care

Future Appointment(s):2018 10:00 am - Hakeem Russell MD at [facility]2018 10:10 am - Sushma Hameed M.D. at [facility] 
[facility] - Loodxedkz97/28/2018 10:30 am - Lindsey Johnston MD at [facility]2018 - Hakeem Russell, MDM75.42 Impingement syndrome 
of left shoulderNew Therapy:Physical TherapyFollow up:Follow up:   6 
[id number] Pain in left shoulderNew Xrays:Shoulder Left 2+ VWSNew Therapy:
Physical Therapy

## 2018-07-23 NOTE — HP
H&P (Free Text)


History and Physical: 





PCP: PATRICIA Hameed MD





Date/Time: 07/23/2018 0125





CC: SOB, hypoxia





HPI: Mrs Vazquez is a 61YO super-morbidly obese female HX DM2, COPD, uterine CA, 

HTN, HLD, ADÁN who presents with onset yesterday of SOB, F/C, sweats, and 

fatigue. She has chronic chest pressure and this is unchanged. She denies N/V/D

, abdominal pain, rash, or other issues. Phlegm is clear and thick. She had 

recently seen Dr Johnston, her pulmonologist, who prescribed her a course of 

steroids that she finished ~1 week ago.





PMedHx


DM2


COPD


HX uterine CA


HTN


HLD


hypothyroidism


ADÁN on BiPap


chronic BLE edema


migraine





Ambulatory Orders


Nursing to reconcile.





Albuterol Sulfate [Ventolin Hfa] 2 puff IN Q6HR PRN 05/09/14 


Levothyroxine TAB (NF) [Synthroid TAB (NF)] 175 mcg PO DAILY 05/09/14 


Atorvastatin* [Lipitor 20 MG*] 20 mg PO 1700 11/18/17 


Cholestyramine Resin* [Questran*] 1 packet PO DAILY 11/18/17 


Metformin HCl [Glucophage] 850 mg PO BID 11/18/17 


Sitagliptin Phosphate [Januvia] 50 mg PO DAILY 11/18/17 


dilTIAZem HCl [Cartia Xt] 120 mg PO DAILY 11/18/17 


Albuterol/Ipratropium NEB.SOL* [Duoneb (Albuterol 2.5 MG/Ipratropium 0.5 MG)] 1 

neb INH QID #1 box 11/20/17 





Allergies


levofloxacin Adverse Reaction (Mild, Verified 07/23/18 01:28)


 Itching





SocHx: quit smoking ~20 years ago, no alcohol or recreational drugs; lives alone

; full code status





FamHx: Mother: lung CA





ROS: as above, otherwise reviewed and all were negative





vitals: 


 Vital Signs











Temp  37.5 C   07/22/18 23:39


 


Pulse  107   07/23/18 01:00


 


Resp  20   07/22/18 23:55


 


BP  125/92   07/22/18 23:48


 


Pulse Ox  99   07/23/18 01:00








 Intake & Output











 07/22/18 07/22/18 07/23/18





 11:59 23:59 11:59


 


Weight  151.046 kg 








Constitutional: NAD, normally developed, super-morbidly obese white female


HEENM: atraumatic; sclera/conjunctiva: anicteric/clear; hearing: clinically 

intact; oropharynx: clear, mucosa moist


Neck: soft tissue: non-tender; thyroid: normal


Pulmonary: R basilar crackle, no wheeze, fair aeration, no accessory muscle use 


CV: RR/RR, normal S1S2, no carotid bruit, no jugular venous distention, 2+ B DP/

PT, 3+ BLE edema


Abdominal: soft, non-distended, non-tender, no rebound/guarding/rigidity, 

normoactive bowel sounds, no hepatosplenomegaly or masses, no costovertebral 

angle tenderness


Musculoskeletal: general: grossly intact, non-tender


Integumental: normal appearance and texture of exposed skin





Psychiatric 


orientation: AA&O to PPS


affect: calm


mood: cooperative


eye contact: fair to good


content: reliable


responses: timely


insight: fair to good





Testing: 


 Lab Results











  07/23/18 07/23/18 07/23/18 Range/Units





  00:11 00:11 00:11 


 


WBC  17.0 H    (3.5-10.8)  10^3/ul


 


RBC  3.99 L    (4.00-5.40)  10^6/ul


 


Hgb  11.9 L    (12.0-16.0)  g/dl


 


Hct  36    (35-47)  %


 


MCV  91    (80-97)  fL


 


MCH  30    (27-31)  pg


 


MCHC  33    (31-36)  g/dl


 


RDW  15    (10.5-15)  %


 


Plt Count  242    (150-450)  10^3/ul


 


MPV  7.3 L    (7.4-10.4)  um3


 


Neut % (Auto)  87.5 H    (38-83)  %


 


Lymph % (Auto)  7.4 L    (25-47)  %


 


Mono % (Auto)  4.4    (0-7)  %


 


Eos % (Auto)  0.2    (0-6)  %


 


Baso % (Auto)  0.5    (0-2)  %


 


Absolute Neuts (auto)  14.9 H    (1.5-7.7)  10^3/ul


 


Absolute Lymphs (auto)  1.3    (1.0-4.8)  10^3/ul


 


Absolute Monos (auto)  0.8    (0-0.8)  10^3/ul


 


Absolute Eos (auto)  0    (0-0.6)  10^3/ul


 


Absolute Basos (auto)  0.1    (0-0.2)  10^3/ul


 


Absolute Nucleated RBC  0    10^3/ul


 


Nucleated RBC %  0    


 


INR (Anticoag Therapy)   0.92   (0.77-1.02)  


 


APTT   33.1   (26.0-36.3)  seconds


 


Sodium    135  (135-145)  mmol/L


 


Potassium    4.5  (3.5-5.0)  mmol/L


 


Chloride    100 L  (101-111)  mmol/L


 


Carbon Dioxide    23  (22-32)  mmol/L


 


Anion Gap    12 H  (2-11)  mmol/L


 


BUN    34 H  (6-24)  mg/dL


 


Creatinine    1.12 H  (0.51-0.95)  mg/dL


 


Est GFR ( Amer)    60.0  (>60)  


 


Est GFR (Non-Af Amer)    49.6  (>60)  


 


BUN/Creatinine Ratio    30.4 H  (8-20)  


 


Glucose    154 H  ()  mg/dL


 


Lactic Acid     (0.5-2.0)  mmol/L


 


Calcium    9.0  (8.6-10.3)  mg/dL


 


Total Bilirubin    0.70  (0.2-1.0)  mg/dL


 


AST    17  (13-39)  U/L


 


ALT    18  (7-52)  U/L


 


Alkaline Phosphatase    70  ()  U/L


 


Troponin I    0.00  (<0.04)  ng/mL


 


C-Reactive Protein    9.50 H  (<8.01)  mg/L


 


B-Natriuretic Peptide    ( - 100) pg/mL


 


Total Protein    6.2 L  (6.4-8.9)  g/dL


 


Albumin    3.4  (3.2-5.2)  g/dL


 


Globulin    2.8  (2-4)  g/dL


 


Albumin/Globulin Ratio    1.2  (1-3)  














  07/23/18 07/23/18 Range/Units





  00:11 00:11 


 


WBC    (3.5-10.8)  10^3/ul


 


RBC    (4.00-5.40)  10^6/ul


 


Hgb    (12.0-16.0)  g/dl


 


Hct    (35-47)  %


 


MCV    (80-97)  fL


 


MCH    (27-31)  pg


 


MCHC    (31-36)  g/dl


 


RDW    (10.5-15)  %


 


Plt Count    (150-450)  10^3/ul


 


MPV    (7.4-10.4)  um3


 


Neut % (Auto)    (38-83)  %


 


Lymph % (Auto)    (25-47)  %


 


Mono % (Auto)    (0-7)  %


 


Eos % (Auto)    (0-6)  %


 


Baso % (Auto)    (0-2)  %


 


Absolute Neuts (auto)    (1.5-7.7)  10^3/ul


 


Absolute Lymphs (auto)    (1.0-4.8)  10^3/ul


 


Absolute Monos (auto)    (0-0.8)  10^3/ul


 


Absolute Eos (auto)    (0-0.6)  10^3/ul


 


Absolute Basos (auto)    (0-0.2)  10^3/ul


 


Absolute Nucleated RBC    10^3/ul


 


Nucleated RBC %    


 


INR (Anticoag Therapy)    (0.77-1.02)  


 


APTT    (26.0-36.3)  seconds


 


Sodium    (135-145)  mmol/L


 


Potassium    (3.5-5.0)  mmol/L


 


Chloride    (101-111)  mmol/L


 


Carbon Dioxide    (22-32)  mmol/L


 


Anion Gap    (2-11)  mmol/L


 


BUN    (6-24)  mg/dL


 


Creatinine    (0.51-0.95)  mg/dL


 


Est GFR ( Amer)    (>60)  


 


Est GFR (Non-Af Amer)    (>60)  


 


BUN/Creatinine Ratio    (8-20)  


 


Glucose    ()  mg/dL


 


Lactic Acid  2.4 H*   (0.5-2.0)  mmol/L


 


Calcium    (8.6-10.3)  mg/dL


 


Total Bilirubin    (0.2-1.0)  mg/dL


 


AST    (13-39)  U/L


 


ALT    (7-52)  U/L


 


Alkaline Phosphatase    ()  U/L


 


Troponin I    (<0.04)  ng/mL


 


C-Reactive Protein    (<8.01)  mg/L


 


B-Natriuretic Peptide   12 ( - 100) pg/mL


 


Total Protein    (6.4-8.9)  g/dL


 


Albumin    (3.2-5.2)  g/dL


 


Globulin    (2-4)  g/dL


 


Albumin/Globulin Ratio    (1-3)  








ECG, personally reviewed: sinus tachycardia rate 114, no ischemia





CXR, personally reviewed: ang-cardial infiltrate





CTA chest, personally reviewed: IMPRESSION: 


   1. No evidence of pulmonary embolus to the lobar level, but smaller 

peripheral 


      vessels cannot be assessed due to bolus timing and motion artifact.


   2. Patchy right greater than left lower lobe opacities, suspicious for 

pneumonia 


      although alveolar hemorrhage or atypical edema could have a similar 


      appearance.


   3. Other nonemergent findings as above.





Impression: 60F HX DM2, COPD, uterine CA, HTN, HLD, ADÁN presenting with severe 

sepsis (tachycardia, tachypnea, leukocytosis, ELIZABETH, & hypoxia) 2nd RML pneumonia

, suspect S pneumonia





DIAGNOSIS & PLAN


Primary 


severe sepsis 2nd RML pneumonia, suspect S pneumonia


: IV azithromycin & piperacillin/tazobactam given in ED


: will continue with IV azithromycin & ceftriaxone


: given super-morbid obesity, will only give 15cc/kg IVF & monitor clinical 

course


: blood & sputum CXs


: check urine Legionella & S pneumo antigens


: supplemental oxygen


: supportive care





acute respiratory failure with hypoxia


: as above





ELIZABETH


: IVFs, trend





Secondary 


DM2


: check A1c


: consistent carb diet


: correctional insulin ACHS





COPD


HX uterine CA


: no acute issues





HTN


: hold anti-hypertensives in setting of severe sepsis


: trend & re-institute as indicated





HLD


: review meds once reconciled





hypothyroidism


: review meds once reconciled





ADÁN


: continue home BiPap





migraine


: review meds once reconciled





Admission Rational: Inpatient as without the above interventions the risk of 

impending adverse outcome is unacceptably high; inappropriate for the 

outpatient setting


DVTp: heparin SQ & SCDs


Code Status: full


HCP: daughterIjeoma





Critical Care time: 45min, with >50% spent at the bedside obtaining history, 

performing exam, advising of diagnosis/treatment, and risks/benefits; remainder 

spent reviewing labs/radiology exams, discussing with ED provider

## 2018-07-24 NOTE — PN
Subjective


Date of Service: 07/24/18


Interval History: 





Ms. Vazquez reports feeling somewhat better though she continues to have a cough 

and feel short of breath with mobility.  She has been titrated down to 1L NC 

this morning.  She denies chest pain, nausea, or abdominal pain.








Objective


Active Medications: 





Acetaminophen (Tylenol Tab*)  650 mg PO Q6H PRN


Albuterol (Ventolin Hfa Inhaler*)  2 puff INH Q6HR PRN


Albuterol/Ipratropium (Duoneb (Albuterol 2.5 Mg/Ipratropium 0.5 Mg))  1 neb INH 

[id number] AWAKE MAKENNA


Atorvastatin Calcium (Lipitor*)  20 mg PO 1700 MAKENNA


Cholestyramine Resin (Questran*)  4 gm PO DAILY MAKENNA


Docusate Sodium (Colace Cap*)  200 mg PO BID MAKENNA


Guaifenesin (Mucinex*)  1,200 mg PO BID [facility]


Heparin Sodium (Porcine) (Heparin Vial(*))  5,000 units SUBCUT Q8HR MAKENNA


Ceftriaxone Sodium 1,000 mg/ (Sodium Chloride)  50 mls @ 200 mls/hr IVPB Q24H 

MAKENNA


Azithromycin 500 mg/ Sodium (Chloride)  250 mls @ 250 mls/hr IVPB Q24H [facility]


Insulin Human Lispro (Humalog*)  0 units SUBCUT ACHS MAKENNA; Protocol


Levothyroxine Sodium (Synthroid Tab*)  175 mcg PO DAILY@0600 [facility]


Melatonin (Melatonin)  3 mg PO BEDTIME PRN; Protocol


Nystatin (Nystatin Top Powder*)  1 applic TOPICAL BID MAKENNA


Omeprazole (Prilosec Cap*)  20 mg PO DAILY@0600 [facility]


Ondansetron HCl (Zofran Odt Tab*)  4 mg PO Q6H PRN


Tramadol HCl (Ultram*)  50 mg PO Q6H PRN





Vital Signs:











Temp Pulse Resp BP Pulse Ox


 


 97.5 F   90   7   118/40   98 


 


 07/24/18 07:48  07/24/18 12:58  07/24/18 12:58  07/24/18 07:48  07/24/18 12:58











Oxygen Devices in Use Now: Nasal Cannula


Appearance: Female lying in bed in NAD


Eyes: No Scleral Icterus


Ears/Nose/Mouth/Throat: Mucous Membranes Moist


Neck: Trachea Midline


Respiratory: Symmetrical Chest Expansion and Respiratory Effort, Clear to 

Auscultation


Cardiovascular: NL Sounds; No Murmurs; No JVD, No Edema


Abdominal: NL Sounds; No Tenderness; No Distention


Lymphatic: No Cervical Adenopathy


Extremities: No Edema


Skin: No Rash or Ulcers


Neurological: Alert and Oriented x 3, NL Muscle Strength and Tone


Nutrition: Taking PO's


Result Diagrams: 


 07/23/18 08:50





 07/23/18 08:50





Assess/Plan/Problems-Billing


Assessment: 





Ms. Vazquez is a 59 yo F with a PMH of COPD, DM, and HTN who was admitted with 

sepsis secondary to pneumonia with acute hypoxic respiratory failure.








- Patient Problems


(1) Pneumonia


Comment: 


- Acute hypoxic respiratory failure improving, now on 1L NC.


- With sepsis based on 2 SIRS criteria, mild hypoxia, and mild ELIZABETH.


- Continue ceftriaxone and azithromycin.   





(2) COPD (chronic obstructive pulmonary disease)


Comment: 


- No wheezing to suggest exacerbation.


- Continue albuterol prn.   





(3) Diabetes mellitus


Comment: 


- Uncontrolled.  BG better controlled this AM.


- Continue low dose lantus.  Hold metformin and sitagliptin.  Continue lispro 

SSI coverage with meals.   





(4) Hypertension


Comment: 


- -130s.


- Hold diltiazem during acute illness. Continue IVF.   





(5) Dyslipidemia


Comment: 


- Continue atorvastatin and cholestyramine.   





(6) Hypothyroid


Comment: 


- TSH 3.25.


- Continue levothyroxine.   





(7) ADÁN (obstructive sleep apnea)


Comment: 


- Continue CPAP/BiPAP qPM.   





(8) DVT prophylaxis


Comment: 


- Heparin SQ.   





(9) Full code status


Current Visit: No   Comment: 


   


Status and Disposition: 





Inpatient with expected LOS > 2 days.  Anticipate discharge to home when 

medically stable.

## 2018-07-25 NOTE — DS
CC:  Dr. Hameed; Dr. Johnston *

 

DATE OF ADMISSION:  07/23/2018.

 

DATE OF DISCHARGE:  07/25/2018.

 

HISTORY OF PRESENT ILLNESS/HOSPITAL COURSE:  This 60-year-old woman presented 
with shortness of breath.  She was hypoxic in the ER.  She had a cough I think 
was largely nonproductive.

 

She was treated with Ceftriaxone and Azithromycin.  Her chest x-ray showed 
bibasilar infiltrates.  The quality of the x-ray was diminished because of her 
body habitus.

 

She improved significantly.  In the hospital, she was not getting steroids as 
of the time of discharge.  She had some insulin coverage.

 

She will complete therapy with three days of Azithromycin and five days of 
Cefuroxime at home.

 

FINAL DIAGNOSES:

1.  Pneumonia.

2.  COPD.

3.  Diabetes.

4.  Hypertension.

5.  Dyslipidemia.

6.  Hypothyroidism.

7.  Obstructive sleep apnea.

8.  Morbid obesity.

 

DISCHARGE MEDICATIONS:

1.  Azithromycin 250 mg daily for 3 days.

2.  Guaifenesin ER 1200 mg b.i.d. for 10 days.

3.  Cefuroxime 500 mg b.i.d. for 5 days.

4.  Albuterol inhaler two puffs every 6 hours prn.

5.  Levothyroxine 175 mcg daily.

6.  Cholestyramine one packet daily.

7.  Sitagliptin 50 mg daily.

8.  Metformin 850 mg b.i.d.

9.  Atorvastatin 20 mg daily.

10. Diltiazem  mg daily.

11. Albuterol Ipratropium by nebulizer 4 times daily.

 

 [id number]/[facility] #: [id number]

MTDD

## 2018-07-25 NOTE — PN
Progress Note





- Progress Note


Date of Service: 07/25/18


Note: 





Time spent on discharge 35 minutes including exam of pt, discussion with pt, 

nurse, CM, review of EMR, preparation of dischage documents.

## 2020-03-14 NOTE — XMS REPORT
Continuity of Care Document (CCD)

 Created on:2020



Patient:Ysabel eVra

Sex:Female

:1958

External Reference #:[id number]





Demographics







 Address  [address]



   [place] [address]

 

 Mobile Phone  [phone]

 

 Email Address  [email or web address]

 

 Preferred Language  en

 

 Marital Status  Not  or 

 

 Anglican Affiliation  Unknown

 

 Race  White

 

 Ethnic Group  Not  or 









Author







 Name  Neelam Lizama NP (transmitted by agent of provider Lala Patel)

 

 Address  [address], [address]



   [place] [address]









Care Team Providers







 Name  Role  Phone

 

 Sushma Hameed MD - Internal  Care Team Information   [phone]
[phone]



 Medicine    

 

 Stacie Plunkett DO - Hospitalist  Care Team Information   [phone]
[phone]









Problems







 Active Problems  Provider  Date

 

 Pure hyperglyceridemia  Sushma Hameed M.D.  Onset: 2014

 

 Type 2 diabetes mellitus  Sushma Hameed M.D.  Onset: 2014

 

 Obstructive sleep apnea syndrome  Sushma Hameed M.D.  Onset: 2014

 

 Hypothyroidism  Sushma Hameed M.D.  Onset: 2014









 Note: XRT for graves









 Obstructive sleep apnea of adult  Iván Newby M.D.  Onset: 10/09/2014









 Note: Severe sleep apnea









 Asthma without status asthmaticus  Iván Newby M.D.  Onset: 10/09/2014

 

 Endometrial carcinoma  Sushma Hameed M.D.  Onset: 2015









 Note: [id number] s/p hysterectomy & adjuvant XRT ( intravaginal bracytherapy )









 Former heavy tobacco smoker  Sushma Hameed M.D.  Onset: 2015









 Note: 32 pk yr quit   CTA chest nl in 12/15









 Psoriasis  Sushma Hameed M.D.  Onset: 2015









 Note: stable









 Morbid obesity  Lindsey Johnston MD  Onset: 2016

 

 Osteoarthritis of knee  Sushma Hameed M.D.  Onset: 2016

 

 Chronic obstructive lung disease  Sushma Hameed M.D.  Onset: 2017

 

 Essential hypertension  Luis Felipe Dyson M.D.  Onset: 2018

 

 Chest pain  Vic Woo MD  Onset: 2020

 

 Dyspnea  Vic Woo MD  Onset: 2020







Social History







 Type  Date  Description  Comments

 

 Birth Sex    Unknown  

 

 Tobacco Use  Start: Unknown End:  Former Cigarette Smoker  



   Unknown    

 

 Smoking Status  Reviewed: 20  Former Cigarette Smoker  

 

 ETOH Use    Denies alcohol use  

 

 Tobacco Use  Start: Unknown End:  Patient is a former  32 pk yr quit in



   Unknown  smoker  

 

 Recreational Drug Use    Denies Drug Use  

 

 Exercise Type/Frequency    Exercises rarely  

 

 Exercise Type/Frequency    Exercises sporadically  walking







Allergies, Adverse Reactions, Alerts







 Active Allergies  Reaction  Severity  Comments  Date

 

 Levaquin  Urticaria      2014









 Inactive Allergies









 NKDA        2013







Medications







 Active Medications  SIG  Qnty  Indications  Ordering  Date



         Provider  

 

 Spiriva Respimat  2 inhalations once  8gm  J44.9  Vic Woo MD  2020



   a day        



 1.25mcg/Act Aerosol          



           

 

 Doxycycline  1 tab twice daily  14tabs  J45.901  Neelam  2020



 Monohydrate  for 1 week      ALEXIS Lizama  



            100mg          



 Tablets          



           

 

 Omega-3-Acid Ethyl  take two capsules  180caps  E78.2  Sushma Hameed,  2018



 Esters  by mouth twice a      M.D.  



       1gm Capsules  day        



           

 

 Januvia  take one tablet by  30tabs  E11.9  Sushma Hameed,  2018



        100mg Tablets  mouth every day      M.D.  



           

 

 Atorvastatin Calcium  take one tablet by  90tabs  E78.2  Riky EKelley  2016



   mouth at bedtime      JAVIER Moore  



 20mg Tablets          



           

 

 Symbicort  1 puffs puffs  1units  J45.909  Lindsey Johnston,  2015



   twice a day      MD  



 160-4.5mcg/Act          



 Aerosol          



           

 

 Freestyle Lite Blood  check fingerstick  1units    Sushma Hameed,  2015



 Glucose Monitoring  three times daily      M.D.  



 System  or as needed - DX:        



        Device  250.00        



           

 

 Freestyle Lite Test  test up to 3times  100units    Sushma Hameed,  2015



   a day or as needed      M.D.  



 Strips   dx code: 250.00        



           

 

 Freestyle Lancets  Test three times  100units    Sushma Hameed,  2015



   daily or as needed      M.D.  



 Misc  - .00        



           

 

 Metformin HCL  Take One Tablet By  60tabs    Stacie Plunkett,  2014



              850mg  Mouth Twice A Day      DO  



 Tablets          



           

 

 Vitamin D-3  1 by mouth every      Unknown  



            125mcg  day        



 (5000 Ut) Tablets          



           

 

 Miralax  17 grams by mouth      Unknown  



         Powder  twice daily        



           

 

 Diltiazem CD  1 by mouth every  30caps    Stacie Plunkett,  



             120mg  day      DO  



 Caps ER 24HR          



           

 

 Levothyroxine Sodium  Take One Tablet By  30tabs    Stacie Plunkett,  



   Mouth Every Day      DO  



 175mcg Tablets          



           

 

 Bipap Mask And  bipap supplies -    G47.33  Unknown  



 Supplies  headgear, cushion,        



          Device  tubing, filters,        



   water chamber for        



   sleep apnea dx        



   G43.77        

 

 Bipap  use at at bedtime    G47.33  Unknown  



       Device          



           

 

 Mucinex  1 tab bid po prn  60tabs    Unknown  



        600mg Tablets          



 ER 12HR          



           

 

 Ventolin HFA  2 puffs by mouth  18gm    Lindsey Johnston,  



   four times a day      MD  



 108(90Base) mcg/Act  as needed        



 Aerosol          



           

 

 Albuterol Sulfate  Every 4-6 hours as  225ml    Neelam  



   needed      ALEXIS Lizama  



 (2.5mg/3ML) 0.083%          



 Nebulizer          



           









 History Medications









 Azithromycin  take 2 tablets by  6tabs  J45.901  Lindsey  10/22/2019 -



             250mg  mouth today then      MD Vickie  2019



 Tablets  take 1 tablet        



   daily for 4 days        

 

 Prednisone  2 tabs daily for 7  21units  J45.901  Lindsey  10/22/2019 -



           10mg (21)  days, 1 tab daily      MD Vickie  2019



 TBPK  for 2 weeks        



           

 

 Cpap Mask And  pls provide  1units  G47.33  Lindsey  10/22/2019 -



 Supplies  necessary cpap      MD Vickie  2020



          Device  supplies, mask to        



   fit, tubing, head        



   gear, filters,        



   humidifier etc        







Medications Administered in Office







 Medication  SIG  Qnty  Indications  Ordering Provider  Date

 

 Shingrix pharmacy administered        Unknown  2019



                 Injection          



           

 

 Shingrix pharmacy administered        Unknown  10/09/2019



                 Injection          



           







Immunizations







 CPT Code  Status  Date  Vaccine  Reaction  Lot #

 

 [id number]  Given  10/11/2019  Influ Virus Vaccine, Quadrivalent,    



       Split Virus, Im Fluzone not PF    

 

 03094  Given  2018  Tdap - Tetanus/Diptheria/Acellular    [id number]



       Pertussis    

 

 01628  Given  2018  Influenza Virus Vaccine, Quadrivalent,    [id number]



       Split, Preservative Free    

 

 [id number]  Given  2017  Influenza Virus Vaccine, Quadrivalent,    



       Split, Preservative Free    

 

 01532  Given  10/18/2016  Influ Virus Vaccine, Quadrivalent,    [id number]



       Split Virus, Im Fluzone not PF    

 

 18968  Given  2015  Influenza Virus Vaccine, Quadrivalent,    [id number]



       Split, Preservative Free    

 

 37692  Given  2015  Pneumococcal Conjugate Vaccine 13    [id number]



       Valent For Intramuscular Use    

 

 08245  Given  10/06/2014  Influenza Virus Vaccine, Quadrivalent,  no reaction  
[id number]



       Split, Preservative Free    

 

 24662  Given  2014  Pneumonia Vaccine    [id number]







Vital Signs







 Date  Vital  Result  Comment

 

 2020 11:26am  Height  61 inches  5'1"









 Weight  336.25 lb  

 

 Heart Rate  82 /min  

 

 BP Systolic  132 mmHg  

 

 BP Diastolic  84 mmHg  

 

 O2 % BldC Oximetry  94 %  

 

 BMI (Body Mass Index)  63.5 kg/m2  









 2020  8:40am  Height  61 inches  5'1"









 Weight  338.38 lb  

 

 Heart Rate  86 /min  

 

 BP Systolic  130 mmHg  

 

 BP Diastolic  84 mmHg  

 

 Body Temperature  97.8 F  

 

 O2 % BldC Oximetry  93 %  

 

 BMI (Body Mass Index)  63.9 kg/m2  







Results







 Test  Acquired Date  Facility  Test  Result  H/L  Range  Note

 

 Sputum Culture  2020  [facility]  Sputum  SEE RESULT      1



 & Sensitiv    [address]  Culture  BELOW      



     [place] [address]  Gram Stain        



     [phone]          

 

 Comp Metabolic  2019  [facility]  Sodium  140 mmol/L  Normal  
135-145  



 Panel    [address]          



     [place] [address]          



     [phone]          









 Potassium  4.4 mmol/L  Normal  3.5-5.0  

 

 Chloride  101 mmol/L  Normal  101-111  

 

 Co2 Carbon Dioxide  32 mmol/L  Normal  22-32  

 

 Anion Gap  7 mmol/L  Normal  2-11  

 

 Glucose  188 mg/dL  High    

 

 Blood Urea Nitrogen  13 mg/dL  Normal  6-24  

 

 Creatinine  0.73 mg/dL  Normal  0.51-0.95  

 

 BUN/Creatinine Ratio  17.8  Normal  8-20  

 

 Calcium  9.3 mg/dL  Normal  8.6-10.3  

 

 Total Protein  6.6 g/dL  Normal  6.4-8.9  

 

 Albumin  4.0 g/dL  Normal  3.2-5.2  

 

 Globulin  2.6 g/dL  Normal  2-4  

 

 Albumin/Globulin Ratio  1.5  Normal  1-3  

 

 Total Bilirubin  0.60 mg/dL  Normal  0.2-1.0  

 

 Alkaline Phosphatase  86 U/L  Normal    

 

 Alt  24 U/L  Normal  7-52  

 

 Ast  17 U/L  Normal  13-39  

 

 Egfr Non-  81.0    >60  

 

 Egfr   98.1    >60  2









 Lipid Profile  2019  [facility]  Triglycerides  363 mg/dL    
  3



 (Trig/Chol/HDL)    [address]          



     [place] [address] [phone]          









 Cholesterol  160 mg/dL      4

 

 HDL Cholesterol  47.9 mg/dL      5

 

 LDL Cholesterol  40 mg/dL      6









 Laboratory test  2019  [facility]  TSH (Thyroid  5.79  High  
0.34-5.60  



 finding    [address]  Stim Horm)  mcIU/mL      



     [place] [address] [phone]          









 T3 Free  2.40 pg/mL  Low  2.5-3.9  

 

 Free T4 (Free Thyroxine)  0.93 ng/dL  Normal  0.61-1.12  









 1  SEE RESULT BELOW



   -----------------------------------------------------------------------------
---------------



   Name:  YSABEL VERA                    : 1958    Attend Dr: 
Neelam Lizama NP



   Acct:  [id number]  Unit: [id number]  AGE: 61            Location:  [facility]



   Re20                        SEX: F             Status:    REG REF



   -----------------------------------------------------------------------------
---------------



   



   SPEC: [id number]       ALLIE:  20-50   [facility] DR: Neelam Lizama NP



   REQ:  [id number]            RECD:  



   STATUS: COMP



   _



   SOURCE: SPUTUM,EXP     SPDESC:



   ORDERED:  Sputum Cult/GS



   



   -----------------------------------------------------------------------------
---------------



   Procedure                         Result                         Reported   
        Site



   -----------------------------------------------------------------------------
---------------



   



   Sputum Smear  Final                                              20-
1704      ML



   2+ Epithelial Cells



   4+ Neutrophils



   



   4+ Gram Negative Coccobacilli



   3+ Gram Positive Cocci



   2+ Gram Negative Bacilli



   



   Sputum Culture  Final                                            20-
1648      ML



   



   Organism 1                     BRANHAMELLA CATARRHALIS



   Quantity                    3+



   Beta Lactamase              Positive



   Organism 2                     NORMAL JEREMY



   Quantity                    2+



   



   There are no routine laboratory methods for suceptibility



   testing of B.catarrhalis.  Quinolones, augmentin,



   doxycycline, cephalosporins or clarithromycin are agents of



   choice.  (Antibiotic Essentials 5th Edition, 2006 p. 29)



   



   -----------------------------------------------------------------------------
---------------



   * [facility] - Main Lab



   .



   



   



   



   



   



   



   



   



   ** END OF REPORT **



   



   DEPARTMENT OF PATHOLOGY,  [address] [place] [address]



   Phone # [phone]      Fax #[phone]



   Elvis Mccray M.D. Director     [facility] # [id number]

 

 2  *******Because ethnic data is not always readily available,



   this report includes an eGFR for both -Americans and



   non- Americans.****



   The National Kidney Disease Education Program (NKDEP) does



   not endorse the use of the MDRD equation for patients that



   are not between the ages of 18 and 70, are pregnant, have



   extremes of body size, muscle mass, or nutritional status,



   or are non- or non-.



   According to the National Kidney Foundation, irrespective of



   diagnosis, the stage of the disease is based on the level of



   kidney function:



   Stage Description                      GFR(mL/min/1.73 m(2))



   1     Kidney damage with normal or decreased GFR       90



   2     Kidney damage with mild decrease in GFR          60-89



   3     Moderate decrease in GFR                         30-59



   4     Severe decrease in GFR                           15-29



   5     Kidney failure                       <15 (or dialysis)

 

 3  Desirable: <150



   Borderline High: 150-199



   High: 200-499



   Very High: >500

 

 4  Desirable: <200



   Borderline High: 200-239



   High: >239

 

 5  Low: <40



   Desirable: 40-60



   High: >60

 

 6  Desirable: <100



   Near Optimal: 100-129



   Borderline High: 130-159



   High: 160-189



   Very High: >189







Procedures







 Date  Code  Description  Status

 

 2020  99482  ECHO Transthoracic, Real-Time 2D With Doppler And  Completed



     Color Flow  

 

 2020  67405  ECHO Transthoracic, Real-Time 2D With Doppler And  Completed



     Color Flow  

 

 2015  938628146  Diabetic Retinal Eye Exam  Completed

 

 2014  24183420  Mammogram  Completed

 

 2014  909424244  Diabetic Retinal Eye Exam  Completed

 

 10/29/2008  37000745  Colonoscopy  Completed

 

 49  13494904  Colonoscopy  Completed







Medical Devices







 Description

 

 No Information Available







Encounters







 Type  Date  Location  Provider  Dx  Diagnosis

 

 Office Visit  2020  [facility]  Neelam YATES45.901  Unspecified 
asthma



   1:00p  [facility]  ALEXIS Lizama    with (acute)



     [facility]      exacerbation









 J44.9  Chronic obstructive pulmonary disease, unspecified

 

 G47.33  Obstructive sleep apnea (adult) (pediatric)

 

 J30.89  Other allergic rhinitis









 Office Visit  2019  9:20a  [facility]  Stacie Kiarra,  Z00.01  
Encounter for



     Medicine - Suite  DO    general adult



     R      medical exam w



           abnormal



           findings









 E11.9  Type 2 diabetes mellitus without complications

 

 I10  Essential (primary) hypertension

 

 Z12.31  Encntr screen mammogram for malignant neoplasm of breast

 

 Z12.11  Encounter for screening for malignant neoplasm of colon

 

 F34.1  Dysthymic disorder









 Office Visit  10/22/2019  [facility]  Lindsey  J45.901  Unspecified asthma



   10:30a  [facility]  MD Vickie    with (acute)



     [facility]      exacerbation









 J44.9  Chronic obstructive pulmonary disease, unspecified

 

 G47.33  Obstructive sleep apnea (adult) (pediatric)







Assessments







 Date  Code  Description  Provider

 

 2020  J45.909  Unspecified asthma, uncomplicated  Neelam Lizama NP

 

 2020  G47.33  Obstructive sleep apnea (adult) (pediatric)  Neelam Lizama NP

 

 2020  J30.89  Other allergic rhinitis  Neelam Lizama NP

 

 2020  I27.20  Pulmonary hypertension, unspecified  Neelam Lizama NP

 

 2020  E66.9  Obesity, unspecified  Neelam Lizama NP

 

 2020  R06.02  Shortness of breath  Rebecca Baldwin M.D.

 

 2020  R06.02  Shortness of breath  [facility]

 

 2020  R06.02  Shortness of breath  Vic Woo MD

 

 2020  J44.9  Chronic obstructive pulmonary disease,  Vic Woo MD



     unspecified  

 

 2020  R07.9  Chest pain, unspecified  Vic Woo MD

 

 2020  E11.9  Type 2 diabetes mellitus without  Vic Woo MD



     complications  

 

 2020  M17.9  Osteoarthritis of knee, unspecified  Vic Woo MD

 

 2020  E66.01  Morbid (severe) obesity due to excess  Vic Woo MD



     calories  

 

 2020  J45.901  Unspecified asthma with (acute)  Neelam Lizama NP



     exacerbation  

 

 2020  J44.9  Chronic obstructive pulmonary disease,  Neelam Lizama NP



     unspecified  

 

 2020  G47.33  Obstructive sleep apnea (adult) (pediatric)  Neelam Lizama NP

 

 2020  J30.89  Other allergic rhinitis  Neelam Lizama NP

 

 2019  Z00.01  Encounter for general adult medical  Stacie Plunkett, DO



     examination with abnormal findings  

 

 2019  E11.9  Type 2 diabetes mellitus without  Stacie Plunkett, DO



     complications  

 

 2019  I10  Essential (primary) hypertension  Stacie Plunkett, DO

 

 2019  Z12.31  Encounter for screening mammogram for  Stacie Plunkett, DO



     malignant neoplasm of breast  

 

 2019  Z12.11  Encounter for screening for malignant  Stacie Plunkett, DO



     neoplasm of colon  

 

 2019  F34.1  Dysthymic disorder  Stacie Plunkett, DO

 

 10/22/2019  J45.901  Unspecified asthma with (acute)  Lindsey Johnston MD



     exacerbation  

 

 10/22/2019  J44.9  Chronic obstructive pulmonary disease,  Lindsey Johnston MD



     unspecified  

 

 10/22/2019  G47.33  Obstructive sleep apnea (adult) (pediatric)  Lindsey Johnston MD







Plan of Treatment

Future Appointment(s):2020 10:30 am - Neelam Lizama NP at 
[facility]2020 10:40 am - Stacie Plunkett DO 
at [facility] Internal Medicine - [id number] - Neelam Lizama NPJ45.909 
Unspecified asthma, uncomplicatedFollow up:2 monthsRecommendations:We will do a 
breathing test to check in on your asthma. Your primary care doctor prescribed 
you Spiriva. Please try using this along with your Symbicort. Spiriva is used 
once per day while Symbicort isused twice per day.G47.33 Obstructive sleep 
apnea (adult) (pediatric)Recommendations:If you have difficulty with your 
equipment, or need to replace your mask or hoses, please contact your homecare 
agency.   If you have any further questions, please call the [facility] at [phone]  If you have any sleepiness while driving you MUST avoid 
operating a vehicle or machinery. If you feel tired while driving pull over and 
take a nap or switch drivers. If you know you are sleepy and need to go 
somewhere, arrange for a ride or use public transportation. It is very 
important to not risk your safety or the safety of others.J30.89 Other allergic 
rhinitisRecommendations:Please start a daily allergy pill like cetirizine (
Zyrtec)I27.20 Pulmonary hypertension, dkysztdhhccO62.9 Obesity, 
unspecifiedRecommendations:I'm glad you [facility] with [facility]. Weight loss should also help with your breathing.



Functional Status







 Description

 

 No Information Available







Mental Status







 Description

 

 No Information Available







Referrals







 Refer to   Reason for Referral  Status  Appt Date

 

 Steffi Biswas MD  super morbid obesity, interested in gastric  Sent  



   bypass eventually if could lose weight.    









 [address] [facility]

 

 [address]

 

 [place], [place] [address] [phone]

 

 









 [facility]  









 [address] [place] [address]

 

 [phone]

## 2020-03-14 NOTE — ED
Respiratory





- HPI Summary


HPI Summary: 





Patient complains of sweats, chills, productive cough, headache, sore throat 

starting yesterday, with some mild chest pressure and shortness of breath 

walking into the ED today.  No shortness of breath while sitting in the exam 

chair.  Denies neck stiffness, N/V/D, abdominal pain, change in urine, change 

in BM. Denies foreign travel within the past 30 days, denies known exposure to 

lab confirmed COVID 19. Patient on nasal cannula 2 L in fast track.  Does not 

wear oxygen at home.   Medical history COPD, DM, HDL, hypothyroid.





- History of Current Complaint


Chief Complaint: EDUpperRespComplaint


Stated Complaint: HEADACHE/FEVER/COUGH PER PT


Time Seen by Provider: 03/14/20 17:55


Hx Obtained From: Patient


Onset/Duration: Sudden Onset, Lasting Hours


Timing: Constant


Initial Severity: Moderate


Current Severity: Moderate


Pain Intensity: 5


Character: Cough (Productive), Dyspnea on Exertion


Sputum Amount: Moderate


Sputum Color: Yellow


Aggravating Factor(s): Exertion


Alleviating Factor(s): Nothing


Associated Signs and Symptoms: Fever, SOB, Chest Pain, Chest Pain with Cough





- Allergy/Home Medications


Allergies/Adverse Reactions: 


 Allergies











Allergy/AdvReac Type Severity Reaction Status Date / Time


 


levofloxacin Allergy Mild Itching Verified 03/14/20 17:47











Home Medications: 


 Home Medications





Albuterol Sulfate [Ventolin Hfa] 2 puff IN Q6HR PRN 05/09/14 [History Confirmed 

07/23/18]


Levothyroxine TAB (NF) [Synthroid TAB (NF)] 175 mcg PO DAILY 05/09/14 [History 

Confirmed 07/23/18]


Atorvastatin* [Lipitor 20 MG*] 20 mg PO 1700 11/18/17 [History Confirmed 07/23/ 18]


Cholestyramine Resin* [Questran*] 1 packet PO DAILY 11/18/17 [History Confirmed 

07/23/18]


Metformin HCl [Glucophage] 850 mg PO BID 11/18/17 [History Confirmed 07/23/18]


Sitagliptin Phosphate [Januvia] 50 mg PO DAILY 11/18/17 [History Confirmed 07/23 /18]


dilTIAZem HCl [Cartia Xt] 120 mg PO DAILY 11/18/17 [History Confirmed 07/23/18]


Albuterol/Ipratropium NEB.SOL* [Duoneb (Albuterol 2.5 MG/Ipratropium 0.5 MG)] 1 

neb INH QID #1 box 11/20/17 [Rx Confirmed 07/23/18]


Azithromycin TAB* [Zithromax TAB (Z-OMKAR) 250 mg #6 tabs] 250 mg PO DAILY #3 tab 

07/25/18 [Rx]


Cefuroxime 500 MG TAB (NF) [Ceftin 500 MG TAB (NF)] 500 mg PO BID #10 tab 07/25/ 18 [Rx]


guaiFENesin ER TAB [Mucinex*] 1,200 mg PO BID #20 tab.er 07/25/18 [Rx]











PMH/Surg Hx/FS Hx/Imm Hx


Endocrine/Hematology History: Reports: Hx Diabetes - glucose typically runs 80'

s - 130's, Hx Thyroid Disease


Cardiovascular History: Reports: Hx Hypercholesterolemia, Other Cardiovascular 

Problems/Disorders - increased heart rate 2 x in her life


   Denies: Hx Deep Vein Thrombosis, Hx Embolism, Hx Hypertension, Hx Myocardial 

Infarction, Hx Pacemaker/ICD


Respiratory History: Reports: Hx Asthma - well controlled, Hx Chronic 

Obstructive Pulmonary Disease (COPD), Hx Pneumonia, Hx Sleep Apnea


GI History: Reports: Hx Gall Bladder Disease - cholecystectomy 27 yrs ago, 

Other GI Disorders - diarrhea w/certain foods (ie. chips)


   Denies: Hx Crohn's Disease


 History: Reports: Other  Problems/Disorders - Frequent UTI's


   Denies: Hx Renal Disease


Musculoskeletal History: Reports: Hx Arthritis - joints, not in back


   Denies: Hx Back Problems


Sensory History: Reports: Hx Contacts or Glasses


   Denies: Hx Hearing Aid


Opthamlomology History: Reports: Hx Contacts or Glasses


Neurological History: Reports: Hx Migraine


Psychiatric History: Reports: Hx Anxiety


   Denies: Hx Panic Disorder





- Cancer History


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


Hx Chemotherapy: No


Hx Radiation Therapy: Yes





- Surgical History


Surgery Procedure, Year, and Place: 3-C SECTION ;  D&C; CYST-COCCYX REMOVED; 

GALLBLADDER- 24 YRS AGO,HYSTERECTOMY; double Hernia Repair





- Immunization History


Date of Tetanus Vaccine: >10 years


Date of Influenza Vaccine: fall 2013


Infectious Disease History: No


Infectious Disease History: 


   Denies: Hx Shingles, Traveled Outside the [place] in Last 30 Days





- Family History


Known Family History: Positive: Other - Cancer


   Negative: Seizure Disorder





- Social History


Alcohol Use: Rare


Hx Substance Use: No


Substance Use Type: Reports: None


Hx Tobacco Use: Yes


Smoking Status (MU): Former Smoker


Type: Cigarettes


Have You Smoked in the Last Year: No





Review of Systems


Positive: Fever


Eyes: Negative


Positive: Sore Throat


Positive: Chest Pain


Positive: Shortness Of Breath, Cough


Gastrointestinal: Negative


Genitourinary: Negative


Musculoskeletal: Negative


Skin: Negative


Positive: Headache


Psychological: Normal


All Other Systems Reviewed And Are Negative: Yes





Physical Exam


Triage Information Reviewed: Yes


Vital Signs On Initial Exam: 


 Initial Vitals











Temp Pulse Resp BP Pulse Ox


 


 101.2 F   110   20   149/71   94 


 


 03/14/20 17:44  03/14/20 17:44  03/14/20 17:44  03/14/20 17:44  03/14/20 17:44











Vital Signs Reviewed: Yes


Appearance: Positive: Well-Appearing


Skin: Positive: Warm


Head/Face: Positive: Normal Head/Face Inspection


Eyes: Positive: Normal


Neck: Positive: Supple


Abdomen Description: Positive: Nontender


Musculoskeletal: Positive: Normal


Neurological: Positive: Normal


Psychiatric: Positive: Normal


AVPU Assessment: Alert





- [place] Coma Scale


Best Eye Response: 4 - Spontaneous


Best Motor Response: 6 - Obeys Commands


Best Verbal Response: 5 - Oriented


Coma Scale Total: 15





Procedures





- Sedation


Patient Received Moderate/Deep Sedation with Procedure: No





Diagnostics





- Vital Signs


 Vital Signs











  Temp Pulse Resp BP Pulse Ox


 


 03/14/20 17:44  101.2 F  110  20  149/71  94














- Laboratory


Result Diagrams: 


 03/14/20 17:56





 03/14/20 17:56


Lab Statement: Any lab studies that have been ordered have been reviewed, and 

results considered in the medical decision making process.





Disposition





- Course


Course Of Treatment: Patient complains of sweats, chills, productive cough, 

headache, sore throat starting yesterday, with some mild chest pressure and 

shortness of breath walking into the ED today.  No shortness of breath while 

sitting in the exam chair.  Denies neck stiffness, N/V/D, abdominal pain, 

change in urine, change in BM. Denies foreign travel within the past 30 days, 

known exposure to lab confirmed COVID 19. Patient on nasal cannula 2 L in fast 

track.  Does not wear oxygen at home.   Medical history COPD, DM, HDL, 

hypothyroid.  Febrile at 101.2.  Heart rate 110.  O2 sats 94% on 2 L.  Patient 

normally on oxygen.  No home oxygen.  Without supplemental oxygen patient's O2 

sats dropped to 88%.  Labs within normal limits.  Negative.  Strep negative.  

Chest x-ray possible right pneumonia.  EKG sinus rhythm, heart rate of 89, 

normal P axis.





- Diagnoses


Provider Diagnoses: 


 Hypoxia, Viral syndrome








Discharge ED





- Sign-Out/Discharge


Documenting (check all that apply): Patient Departure





- Discharge Plan


Condition: Stable


Disposition: ADMITTED TO [facility]


Referrals: 


Sushma Hameed MD [Medical Doctor] - 





- Billing Disposition and Condition


Condition: STABLE


Disposition: Admitted to [facility]

## 2020-03-14 NOTE — XMS REPORT
Continuity of Care Document (CCD)

 Created on:2020



Patient:Ysabel Vera

Sex:Female

:1958

External Reference #:[id number]





Demographics







 Address  [address]



   [place] [address]

 

 Mobile Phone  [phone]

 

 Email Address  [email or web address]

 

 Preferred Language  en

 

 Marital Status  Not  or 

 

 Church Affiliation  Unknown

 

 Race  White

 

 Ethnic Group  Not  or 









Author







 Name  Vic Woo MD (transmitted by agent of provider Jess Moser)

 

 Address  [address] [place] [address]









Care Team Providers







 Name  Role  Phone

 

 Sushma Hameed MD - Internal  Care Team Information   [phone]
[phone]



 Medicine    

 

 Stacie Plunkett DO - Hospitalist  Care Team Information   [phone]
[phone]









Problems







 Active Problems  Provider  Date

 

 Pure hyperglyceridemia  Sushma Hameed M.D.  Onset: 2014

 

 Type 2 diabetes mellitus  Sushma Hameed M.D.  Onset: 2014

 

 Obstructive sleep apnea syndrome  Sushma Hameed M.D.  Onset: 2014

 

 Hypothyroidism  Sushma Hameed M.D.  Onset: 2014









 Note: XRT for graves









 Obstructive sleep apnea of adult  Iván Newby M.D.  Onset: 10/09/2014









 Note: Severe sleep apnea









 Asthma without status asthmaticus  Iván Newby M.D.  Onset: 10/09/2014

 

 Endometrial carcinoma  Sushma Hameed M.D.  Onset: 2015









 Note: [id number] s/p hysterectomy & adjuvant XRT ( intravaginal bracytherapy )









 Former heavy tobacco smoker  Sushma Hameed M.D.  Onset: 2015









 Note: 32 pk yr quit   CTA chest nl in 12/15









 Psoriasis  Sushma Hameed M.D.  Onset: 2015









 Note: stable









 Morbid obesity  Lindsey Johnston MD  Onset: 2016

 

 Osteoarthritis of knee  Sushma Hameed M.D.  Onset: 2016

 

 Chronic obstructive lung disease  Sushma Hameed M.D.  Onset: 2017

 

 Essential hypertension  Luis Felipe Dyson M.D.  Onset: 2018

 

 Chest pain  Vic Woo MD  Onset: 2020

 

 Dyspnea  Vic Woo MD  Onset: 2020







Social History







 Type  Date  Description  Comments

 

 Birth Sex    Unknown  

 

 Tobacco Use  Start: Unknown End:  Former Cigarette Smoker  



   Unknown    

 

 Smoking Status  Reviewed: 20  Former Cigarette Smoker  

 

 ETOH Use    Denies alcohol use  

 

 Tobacco Use  Start: Unknown End:  Patient is a former  32 pk yr quit in



   Unknown  smoker  1998

 

 Recreational Drug Use    Denies Drug Use  

 

 Exercise Type/Frequency    Exercises rarely  

 

 Exercise Type/Frequency    Exercises sporadically  walking







Allergies, Adverse Reactions, Alerts







 Active Allergies  Reaction  Severity  Comments  Date

 

 Levaquin  Urticaria      2014









 Inactive Allergies









 NKDA        2013







Medications







 Active Medications  SIG  Qnty  Indications  Ordering  Date



         Provider  

 

 Spiriva Respimat  2 inhalations once  8gm  J44.9  Vic Woo MD  2020



   a day        



 1.25mcg/Act Aerosol          



           

 

 Doxycycline  1 tab twice daily  14tabs  J45.901  Neelam  2020



 Monohydrate  for 1 week      ALEXIS Lizama  



            100mg          



 Tablets          



           

 

 Cpap Mask And  pls provide  1units  G47.33  Lindsey Johnston,  10/22/2019



 Supplies  necessary cpap      MD  



          Device  supplies, mask to        



   fit, tubing, head        



   gear, filters,        



   humidifier etc        

 

 Omega-3-Acid Ethyl  take two capsules  180caps  E78.2  Sushma Hameed,  2018



 Esters  by mouth twice a      M.D.  



       1gm Capsules  day        



           

 

 Januvia  take one tablet by  30tabs  E11.9  Sushma Hameed,  2018



        100mg Tablets  mouth every day      M.D.  



           

 

 Atorvastatin Calcium  take one tablet by  90tabs  E78.2  Riky E.  2016



   mouth at bedtime      JAVIER Moore  



 20mg Tablets          



           

 

 Symbicort  1 puffs puffs  1units  J45.909  Lindsey Johnston,  2015



   twice a day      MD  



 160-4.5mcg/Act          



 Aerosol          



           

 

 Freestyle Lite Blood  check fingerstick  1units    Sushma Hameed,  2015



 Glucose Monitoring  three times daily      M.D.  



 System  or as needed - DX:        



        Device  250.00        



           

 

 Freestyle Lite Test  test up to 3times  100units    Sushma Hameed,  2015



   a day or as needed      M.D.  



 Strips   dx code: 250.00        



           

 

 Freestyle Lancets  Test three times  100units    Sushma Hameed,  2015



   daily or as needed      M.D.  



 Misc  - .00        



           

 

 Metformin HCL  Take One Tablet By  60tabs    Stacie Plunkett,  2014



              850mg  Mouth Twice A Day      DO  



 Tablets          



           

 

 Vitamin D-3  1 by mouth every      Unknown  



            125mcg  day        



 (5000 Ut) Tablets          



           

 

 Miralax  17 grams by mouth      Unknown  



         Powder  twice daily        



           

 

 Diltiazem CD  1 by mouth every      Unknown  



             120mg  day        



 Caps ER 24HR          



           

 

 Levothyroxine Sodium  Take One Tablet By  30tabs    Stacie Plunkett,  



   Mouth Every Day      DO  



 175mcg Tablets          



           

 

 Bipap Mask And  bipap supplies -    G47.33  Unknown  



 Supplies  headgear, cushion,        



          Device  tubing, filters,        



   water chamber for        



   sleep apnea dx        



   G43.77        

 

 Bipap  use at at bedtime    G47.33  Unknown  



       Device          



           

 

 Mucinex  1 tab bid po prn  60tabs    Unknown  



        600mg Tablets          



 ER 12HR          



           

 

 Ventolin HFA  2 puffs by mouth  18gm    Lindsey Johnston,  



   four times a day      MD  



 108(90Base) mcg/Act  as needed        



 Aerosol          



           

 

 Albuterol Sulfate  Every 4-6 hours as  225ml    Neelam  



   needed      ALEXIS Lizama  



 (2.5mg/3ML) 0.083%          



 Nebulizer          



           









 History Medications









 Azithromycin  take 2 tablets  6tabs  J45.901  Lindsey Johnston,  10/22/2019 -



            250mg  by mouth today      MD  2019



 Tablets  then take 1        



   tablet daily        



   for 4 days        

 

 Prednisone  2 tabs daily  21units  J45.901  Lindsey Johnston,  10/22/2019 -



          10mg (21)  for 7 days, 1      MD  2019



 TBPK  tab daily for 2        



   weeks        







Medications Administered in Office







 Medication  SIG  Qnty  Indications  Ordering Provider  Date

 

 Shingrix pharmacy administered        Unknown  2019



                 Injection          



           

 

 Shingrix pharmacy administered        Unknown  10/09/2019



                 Injection          



           







Immunizations







 CPT Code  Status  Date  Vaccine  Reaction  Lot #

 

 [id number]  Given  10/11/2019  Influ Virus Vaccine, Quadrivalent,    



       Split Virus, Im Fluzone not PF    

 

 13094  Given  2018  Tdap - Tetanus/Diptheria/Acellular    [id number]



       Pertussis    

 

 26117  Given  2018  Influenza Virus Vaccine, Quadrivalent,    [id number]



       Split, Preservative Free    

 

 06766  Given  2017  Influenza Virus Vaccine, Quadrivalent,    



       Split, Preservative Free    

 

 27529  Given  10/18/2016  Influ Virus Vaccine, Quadrivalent,    [id number]



       Split Virus, Im Fluzone not PF    

 

 53600  Given  2015  Influenza Virus Vaccine, Quadrivalent,    [id number]



       Split, Preservative Free    

 

 40033  Given  2015  Pneumococcal Conjugate Vaccine 13    [id number]



       Valent For Intramuscular Use    

 

 39730  Given  10/06/2014  Influenza Virus Vaccine, Quadrivalent,  no reaction  
[id number]



       Split, Preservative Free    

 

 85831  Given  2014  Pneumonia Vaccine    [id number]







Vital Signs







 Date  Vital  Result  Comment

 

 2020  8:40am  Height  61 inches  5'1"









 Weight  338.38 lb  

 

 Heart Rate  86 /min  

 

 BP Systolic  130 mmHg  

 

 BP Diastolic  84 mmHg  

 

 Body Temperature  97.8 F  

 

 O2 % BldC Oximetry  93 %  

 

 BMI (Body Mass Index)  63.9 kg/m2  









 2020  1:00pm  Height  61 inches  5'1"









 Weight  334.00 lb  

 

 Heart Rate  84 /min  

 

 BP Systolic  142 mmHg  

 

 BP Diastolic  76 mmHg  

 

 O2 % BldC Oximetry  93 %  

 

 BMI (Body Mass Index)  63.1 kg/m2  







Results







 Test  Acquired Date  Facility  Test  Result  H/L  Range  Note

 

 Sputum Culture  2020  [facility]  Sputum  SEE RESULT      1



 & Sensitiv    [address]  Culture  BELOW      



     [place] [address]  Gram Stain        



     [phone]          

 

 Comp Metabolic  2019  [facility]  Sodium  140 mmol/L  Normal  
135-145  



 Panel    [address]          



     [place], [place] [id number] [phone]          









 Potassium  4.4 mmol/L  Normal  3.5-5.0  

 

 Chloride  101 mmol/L  Normal  101-111  

 

 Co2 Carbon Dioxide  32 mmol/L  Normal  22-32  

 

 Anion Gap  7 mmol/L  Normal  2-11  

 

 Glucose  188 mg/dL  High    

 

 Blood Urea Nitrogen  13 mg/dL  Normal  6-24  

 

 Creatinine  0.73 mg/dL  Normal  0.51-0.95  

 

 BUN/Creatinine Ratio  17.8  Normal  8-20  

 

 Calcium  9.3 mg/dL  Normal  8.6-10.3  

 

 Total Protein  6.6 g/dL  Normal  6.4-8.9  

 

 Albumin  4.0 g/dL  Normal  3.2-5.2  

 

 Globulin  2.6 g/dL  Normal  2-4  

 

 Albumin/Globulin Ratio  1.5  Normal  1-3  

 

 Total Bilirubin  0.60 mg/dL  Normal  0.2-1.0  

 

 Alkaline Phosphatase  86 U/L  Normal    

 

 Alt  24 U/L  Normal  7-52  

 

 Ast  17 U/L  Normal  13-39  

 

 Egfr Non-  81.0    >60  

 

 Egfr   98.1    >60  2









 Lipid Profile  2019  [facility]  Triglycerides  363 mg/dL    
  3



 (Trig/Chol/HDL)    [address]          



     [place] [address] [phone]          









 Cholesterol  160 mg/dL      4

 

 HDL Cholesterol  47.9 mg/dL      5

 

 LDL Cholesterol  40 mg/dL      6









 Laboratory test  2019  [facility]  TSH (Thyroid  5.79  High  
0.34-5.60  



 finding    [address]  Stim Horm)  mcIU/mL      



     [place] [address] [phone]          









 T3 Free  2.40 pg/mL  Low  2.5-3.9  

 

 Free T4 (Free Thyroxine)  0.93 ng/dL  Normal  0.61-1.12  









 1  SEE RESULT BELOW



   -----------------------------------------------------------------------------
---------------



   Name:  YSABEL VERA                    : 1958    Attend Dr: 
Neelam Lizama NP



   Acct:  [id number]  Unit: [id number]  AGE: 61            Location:  [facility]



   Re20                        SEX: F             Status:    REG REF



   -----------------------------------------------------------------------------
---------------



   



   SPEC: [id number]       ALLIE:  50   [facility] DR: Neelam Lizama NP



   REQ:  [id number]            RECD:  



   STATUS: COMP



   _



   SOURCE: SPUTUM,EXP     SPDESC:



   ORDERED:  Sputum Cult/GS



   



   -----------------------------------------------------------------------------
---------------



   Procedure                         Result                         Reported   
        Site



   -----------------------------------------------------------------------------
---------------



   



   Sputum Smear  Final                                              20-
1704      ML



   2+ Epithelial Cells



   4+ Neutrophils



   



   4+ Gram Negative Coccobacilli



   3+ Gram Positive Cocci



   2+ Gram Negative Bacilli



   



   Sputum Culture  Final                                            20-
1648      ML



   



   Organism 1                     BRANHAMELLA CATARRHALIS



   Quantity                    3+



   Beta Lactamase              Positive



   Organism 2                     NORMAL JEREMY



   Quantity                    2+



   



   There are no routine laboratory methods for suceptibility



   testing of B.catarrhalis.  Quinolones, augmentin,



   doxycycline, cephalosporins or clarithromycin are agents of



   choice.  (Antibiotic Essentials 5th Edition, 2006 p. 29)



   



   -----------------------------------------------------------------------------
---------------



   * [facility] - Main Lab



   .



   



   



   



   



   



   



   



   



   ** END OF REPORT **



   



   DEPARTMENT OF PATHOLOGY,  [address] [place] [address]



   Phone # [phone]      Fax #[phone]



   Elvis Mccray M.D. Director     [facility] # [id number]

 

 2  *******Because ethnic data is not always readily available,



   this report includes an eGFR for both -Americans and



   non- Americans.****



   The National Kidney Disease Education Program (NKDEP) does



   not endorse the use of the MDRD equation for patients that



   are not between the ages of 18 and 70, are pregnant, have



   extremes of body size, muscle mass, or nutritional status,



   or are non- or non-.



   According to the National Kidney Foundation, irrespective of



   diagnosis, the stage of the disease is based on the level of



   kidney function:



   Stage Description                      GFR(mL/min/1.73 m(2))



   1     Kidney damage with normal or decreased GFR       90



   2     Kidney damage with mild decrease in GFR          60-89



   3     Moderate decrease in GFR                         30-59



   4     Severe decrease in GFR                           15-29



   5     Kidney failure                       <15 (or dialysis)

 

 3  Desirable: <150



   Borderline High: 150-199



   High: 200-499



   Very High: >500

 

 4  Desirable: <200



   Borderline High: 200-239



   High: >239

 

 5  Low: <40



   Desirable: 40-60



   High: >60

 

 6  Desirable: <100



   Near Optimal: 100-129



   Borderline High: 130-159



   High: 160-189



   Very High: >189







Procedures







 Date  Code  Description  Status

 

 2015  [id number]  Diabetic Retinal Eye Exam  Completed

 

 2014  28615906  Mammogram  Completed

 

 2014  397356375  Diabetic Retinal Eye Exam  Completed

 

 10/29/2008  [id number]  Colonoscopy  Completed

 

 49  43436869  Colonoscopy  Completed







[employer] Devices







 Description

 

 No Information Available







Encounters







 Type  Date  Location  Provider  Dx  Diagnosis

 

 Office Visit  2020  [facility]  Neelam  J45.901  Unspecified 
asthma



   1:00p  [facility]  ALEXIS Lizama    with (acute)



     [facility]      exacerbation









 J44.9  Chronic obstructive pulmonary disease, unspecified

 

 G47.33  Obstructive sleep apnea (adult) (pediatric)

 

 J30.89  Other allergic rhinitis









 Office Visit  2019  9:20a  [facility]  Stacie Senner,  Z00.01  
Encounter for



     Medicine - Suite  DO    general adult



     R      medical exam w



           abnormal



           findings









 E11.9  Type 2 diabetes mellitus without complications

 

 I10  Essential (primary) hypertension

 

 Z12.31  Encntr screen mammogram for malignant neoplasm of breast

 

 Z12.11  Encounter for screening for malignant neoplasm of colon

 

 F34.1  Dysthymic disorder









 Office Visit  10/22/2019  [facility]  Lindsey  J45.901  Unspecified asthma



   10:30a  [facility]  MD Vickie    with (acute)



     [facility]      exacerbation









 J44.9  Chronic obstructive pulmonary disease, unspecified

 

 G47.33  Obstructive sleep apnea (adult) (pediatric)







Assessments







 Date  Code  Description  Provider

 

 2020  R06.02  Shortness of breath  Vic Woo MD

 

 2020  J44.9  Chronic obstructive pulmonary disease,  Vic Woo MD



     unspecified  

 

 2020  R07.9  Chest pain, unspecified  Vic oWo MD

 

 2020  E11.9  Type 2 diabetes mellitus without  Vic Woo MD



     complications  

 

 2020  M17.9  Osteoarthritis of knee, unspecified  Vic Woo MD

 

 2020  E66.01  Morbid (severe) obesity due to excess  Vic Woo MD



     calories  

 

 2020  J45.901  Unspecified asthma with (acute)  Neelam Lizama NP



     exacerbation  

 

 2020  J44.9  Chronic obstructive pulmonary disease,  Neelam Lizama NP



     unspecified  

 

 2020  G47.33  Obstructive sleep apnea (adult) (pediatric)  Neelam Lizama NP

 

 2020  J30.89  Other allergic rhinitis  Neelam Lizama NP

 

 2019  Z00.01  Encounter for general adult medical  Stacie Plunkett, DO



     examination with abnormal findings  

 

 2019  E11.9  Type 2 diabetes mellitus without  Stacie Plunkett, DO



     complications  

 

 2019  I10  Essential (primary) hypertension  Stacie Plunkett, 

 

 2019  Z12.31  Encounter for screening mammogram for  Stacie Plunkett, DO



     malignant neoplasm of breast  

 

 2019  Z12.11  Encounter for screening for malignant  Stacie Plunkett, DO



     neoplasm of colon  

 

 2019  F34.1  Dysthymic disorder  Stacie Plunkett, DO

 

 10/22/2019  J45.901  Unspecified asthma with (acute)  Lindsey Johnston MD



     exacerbation  

 

 10/22/2019  J44.9  Chronic obstructive pulmonary disease,  Lindsey Johnston MD



     unspecified  

 

 10/22/2019  G47.33  Obstructive sleep apnea (adult) (pediatric)  Lindsey Johnston MD







Plan of Treatment

Future Appointment(s):2020  1:40 pm - Vic Woo MD at [facility] Internal 
Medicine - [id number] 11:30 am - Neelam Lizama NP at [facility]2020 10:40 am - Stacie Plunkett DO at [facility] 
Internal Medicine - Suite  - Vic Woo, MDR06.02 Shortness of 
breathNew Orders:Echocardiogram, Ordered: 20Comments:We need to get some 
labs, an EKG(today in office) and an updated ECHO. Medically supervised weight 
loss is also absolutely critical.Follow up:4 stghiH23.9 Chronic obstructive 
pulmonary disease, unspecifiedNew Medication:Spiriva Respimat 1.25 mcg/Act - 2 
inhalations once a dayComments:Please start AwtjfuoW52.9 Chest pain, 
unspecifiedNew Orders:EKG, Ordered: 20E11.9 Type 2 diabetes mellitus 
without geuyvcqwzkejfC31.9 Osteoarthritis of knee, unspecifiedNew Therapy:
Physical TwclqafV01.01 Morbid (severe) obesity due to excess caloriesComments:
Referring you to Dr. Biswas for evaluation for medically supervised weight 
loss program.Referral:Steffi Biswas MD, Internal Medicine



Functional Status







 Description

 

 No Information Available







Mental Status







 Description

 

 No Information Available







Referrals







 Refer to   Reason for Referral  Status  Appt Date

 

 Steffi Biswas MD  super morbid obesity, interested in gastric  Created  




   bypass eventually if could lose weight.    









 [address] [facility]

 

 [address]

 

 [place] [address] [phone]

 

 









 [facility]    Created  









 [address], [place] [id number] [phone]

## 2020-03-14 NOTE — XMS REPORT
Continuity of Care Document (CCD)

 Created on:2020



Patient:Ysabel Vera

Sex:Female

:1958

External Reference #:[id number]





Demographics







 Address  [address]



   [place] [address]

 

 Mobile Phone  [phone]

 

 Email Address  [email or web address]

 

 Preferred Language  en

 

 Marital Status  Not  or 

 

 Scientology Affiliation  Unknown

 

 Race  White

 

 Ethnic Group  Not  or 









Author







 Name  Vic Woo MD (transmitted by agent of provider Jess Moser)

 

 Address  [address] [place] [address]









Care Team Providers







 Name  Role  Phone

 

 Sushma Hameed MD - Internal  Care Team Information   [phone]
[phone]



 Medicine    

 

 Stacie Plunkett DO - Hospitalist  Care Team Information   [phone]
[phone]









Problems







 Active Problems  Provider  Date

 

 Pure hyperglyceridemia  Sushma Hameed M.D.  Onset: 2014

 

 Type 2 diabetes mellitus  Sushma Hameed M.D.  Onset: 2014

 

 Obstructive sleep apnea syndrome  Sushma Hameed M.D.  Onset: 2014

 

 Hypothyroidism  Sushma Hameed M.D.  Onset: 2014









 Note: XRT for graves









 Obstructive sleep apnea of adult  Iván Newby M.D.  Onset: 10/09/2014









 Note: Severe sleep apnea









 Asthma without status asthmaticus  Iván Newby M.D.  Onset: 10/09/2014

 

 Endometrial carcinoma  Sushma Hameed M.D.  Onset: 2015









 Note: [id number] s/p hysterectomy & adjuvant XRT ( intravaginal bracytherapy )









 Former heavy tobacco smoker  Sushma Hameed M.D.  Onset: 2015









 Note: 32 pk yr quit   CTA chest nl in 12/15









 Psoriasis  Sushma Hameed M.D.  Onset: 2015









 Note: stable









 Morbid obesity  Lindsey Johnston MD  Onset: 2016

 

 Osteoarthritis of knee  Sushma Hamede M.D.  Onset: 2016

 

 Chronic obstructive lung disease  Sushma aHmeed M.D.  Onset: 2017

 

 Essential hypertension  Luis Felipe Dyson M.D.  Onset: 2018

 

 Generalized anxiety disorder  Vic Woo MD  Onset: 2020

 

 Chest pain  Vic Woo MD  Onset: 2020

 

 Dyspnea  Vic Woo MD  Onset: 2020







Social History







 Type  Date  Description  Comments

 

 Birth Sex    Unknown  

 

 Tobacco Use  Start: Unknown End:  Former Cigarette Smoker  



   Unknown    

 

 Smoking Status  Reviewed: 20  Former Cigarette Smoker  

 

 ETOH Use    Denies alcohol use  

 

 Tobacco Use  Start: Unknown End:  Patient is a former  32 pk yr quit in



   Unknown  smoker  

 

 Recreational Drug Use    Denies Drug Use  

 

 Exercise Type/Frequency    Exercises rarely  

 

 Exercise Type/Frequency    Exercises sporadically  walking







Allergies, Adverse Reactions, Alerts







 Active Allergies  Reaction  Severity  Comments  Date

 

 Levaquin  Urticaria      2014









 Inactive Allergies









 NKDA        2013







Medications







 Active Medications  SIG  Qnty  Indications  Ordering  Date



         Provider  

 

 Duloxetine HCL  1 by mouth every  90caps  F41.1  Vic Woo MD  2020



               30mg  day for 2 weeks        



 Caps DR Part  then increase to 2        



   by mouth every day        

 

 Spiriva Respimat  2 inhalations once  8gm  J44.9  Vic Woo MD  2020



   a day        



 1.25mcg/Act Aerosol          



           

 

 Doxycycline  1 tab twice daily  14tabs  J45.901  Neelam  2020



 Monohydrate  for 1 week      ALEXIS Lizama  



            100mg          



 Tablets          



           

 

 Omega-3-Acid Ethyl  take two capsules  180caps  E78.2  Sushma Hameed,  2018



 Esters  by mouth twice a      M.D.  



       1gm Capsules  day        



           

 

 Januvia  take one tablet by  30tabs  E11.9  Sushma Hameed,  2018



        100mg Tablets  mouth every day      M.D.  



           

 

 Atorvastatin Calcium  take one tablet by  90tabs  E78.2  Riky EKelley  2016



   mouth at bedtime      JAVIER Moore  



 20mg Tablets          



           

 

 Symbicort  1 puffs puffs  1units  J45.909  Lindsey Johnston,  2015



   twice a day      MD  



 160-4.5mcg/Act          



 Aerosol          



           

 

 Freestyle Lite Blood  check fingerstick  1units    Sushma Hameed,  2015



 Glucose Monitoring  three times daily      M.D.  



 System  or as needed - DX:        



        Device  250.00        



           

 

 Freestyle Lite Test  test up to 3times  100units    Sushma Hameed,  2015



   a day or as needed      M.D.  



 Strips   dx code: 250.00        



           

 

 Freestyle Lancets  Test three times  100units    Sushma Hameed,  2015



   daily or as needed      M.D.  



 Misc  - .00        



           

 

 Metformin HCL  Take One Tablet By  60tabs    Stacie Plunkett,  2014



              850mg  Mouth Twice A Day      DO  



 Tablets          



           

 

 Vitamin D-3  1 by mouth every      Unknown  



            125mcg  day        



 (5000 Ut) Tablets          



           

 

 Miralax  17 grams by mouth      Unknown  



         Powder  twice daily        



           

 

 Diltiazem CD  1 by mouth every  30caps    Stacie Plunkett,  



             120mg  day      DO  



 Caps ER 24HR          



           

 

 Levothyroxine Sodium  Take One Tablet By  30tabs    Stacie Plunkett,  



   Mouth Every Day      DO  



 175mcg Tablets          



           

 

 Bipap Mask And  bipap supplies -    G47.33  Unknown  



 Supplies  headgear, cushion,        



          Device  tubing, filters,        



   water chamber for        



   sleep apnea dx        



   G43.77        

 

 Bipap  use at at bedtime    G47.33  Unknown  



       Device          



           

 

 Mucinex  1 tab bid po prn  60tabs    Unknown  



        600mg Tablets          



 ER 12HR          



           

 

 Ventolin HFA  2 puffs by mouth  18gm    Lindsey Johnston,  



   four times a day      MD  



 108(90Base) mcg/Act  as needed        



 Aerosol          



           

 

 Albuterol Sulfate  Every 4-6 hours as  225ml    Neelam  



   needed      ALEXIS Lizama  



 (2.5mg/3ML) 0.083%          



 Nebulizer          



           









 History Medications









 Azithromycin  take 2 tablets by  6tabs  J45.901  Lindsey  10/22/2019 -



             250mg  mouth today then      MD Vickie  2019



 Tablets  take 1 tablet        



   daily for 4 days        

 

 Prednisone  2 tabs daily for 7  21units  J45.901  Lindsey  10/22/2019 -



           10mg (21)  days, 1 tab daily      MD Vickie  2019



 TBPK  for 2 weeks        



           

 

 Cpap Mask And  pls provide  1units  G47.33  Lindsey  10/22/2019 -



 Supplies  necessary cpap      MD Vickie  2020



          Device  supplies, mask to        



   fit, tubing, head        



   gear, filters,        



   humidifier etc        







Medications Administered in Office







 Medication  SIG  Qnty  Indications  Ordering Provider  Date

 

 Shingrix pharmacy administered        Unknown  2019



                 Injection          



           

 

 Shingrix pharmacy administered        Unknown  10/09/2019



                 Injection          



           







Immunizations







 CPT Code  Status  Date  Vaccine  Reaction  Lot #

 

 [id number]  Given  10/11/2019  Influ Virus Vaccine, Quadrivalent,    



       Split Virus, Im Fluzone not PF    

 

 04899  Given  2018  Tdap - Tetanus/Diptheria/Acellular    [id number]



       Pertussis    

 

 05046  Given  2018  Influenza Virus Vaccine, Quadrivalent,    [id number]



       Split, Preservative Free    

 

 92243  Given  2017  Influenza Virus Vaccine, Quadrivalent,    



       Split, Preservative Free    

 

 49418  Given  10/18/2016  Influ Virus Vaccine, Quadrivalent,    [id number]



       Split Virus, Im Fluzone not PF    

 

 03740  Given  2015  Influenza Virus Vaccine, Quadrivalent,    [id number]



       Split, Preservative Free    

 

 77006  Given  2015  Pneumococcal Conjugate Vaccine 13    [id number]



       Valent For Intramuscular Use    

 

 38730  Given  10/06/2014  Influenza Virus Vaccine, Quadrivalent,  no reaction  
[id number]



       Split, Preservative Free    

 

 59835  Given  2014  Pneumonia Vaccine    [id number]







Vital Signs







 Date  Vital  Result  Comment

 

 2020 11:26am  Height  61 inches  5'1"









 Weight  336.25 lb  

 

 Heart Rate  82 /min  

 

 BP Systolic  132 mmHg  

 

 BP Diastolic  84 mmHg  

 

 O2 % BldC Oximetry  94 %  

 

 BMI (Body Mass Index)  63.5 kg/m2  









 2020  8:40am  Height  61 inches  5'1"









 Weight  338.38 lb  

 

 Heart Rate  86 /min  

 

 BP Systolic  130 mmHg  

 

 BP Diastolic  84 mmHg  

 

 Body Temperature  97.8 F  

 

 O2 % BldC Oximetry  93 %  

 

 BMI (Body Mass Index)  63.9 kg/m2  







Results







 Test  Acquired Date  Facility  Test  Result  H/L  Range  Note

 

 Sputum Culture  2020  [facility]  Sputum  SEE RESULT      1



 & Sensitiv    [address]  Culture  BELOW      



     [place] [address]  Gram Stain        



     [phone]          

 

 Comp Metabolic  2019  [facility]  Sodium  140 mmol/L  Normal  
135-145  



 Panel    [address]          



     [place] [address]          



     [phone]          









 Potassium  4.4 mmol/L  Normal  3.5-5.0  

 

 Chloride  101 mmol/L  Normal  101-111  

 

 Co2 Carbon Dioxide  32 mmol/L  Normal  22-32  

 

 Anion Gap  7 mmol/L  Normal  2-11  

 

 Glucose  188 mg/dL  High    

 

 Blood Urea Nitrogen  13 mg/dL  Normal  6-24  

 

 Creatinine  0.73 mg/dL  Normal  0.51-0.95  

 

 BUN/Creatinine Ratio  17.8  Normal  8-20  

 

 Calcium  9.3 mg/dL  Normal  8.6-10.3  

 

 Total Protein  6.6 g/dL  Normal  6.4-8.9  

 

 Albumin  4.0 g/dL  Normal  3.2-5.2  

 

 Globulin  2.6 g/dL  Normal  2-4  

 

 Albumin/Globulin Ratio  1.5  Normal  1-3  

 

 Total Bilirubin  0.60 mg/dL  Normal  0.2-1.0  

 

 Alkaline Phosphatase  86 U/L  Normal    

 

 Alt  24 U/L  Normal  7-52  

 

 Ast  17 U/L  Normal  13-39  

 

 Egfr Non-  81.0    >60  

 

 Egfr   98.1    >60  2









 Lipid Profile  2019  [facility]  Triglycerides  363 mg/dL    
  3



 (Trig/Chol/HDL)    [address]          



     [place] [address] [phone]          









 Cholesterol  160 mg/dL      4

 

 HDL Cholesterol  47.9 mg/dL      5

 

 LDL Cholesterol  40 mg/dL      6









 Laboratory test  2019  [facility]  TSH (Thyroid  5.79  High  
0.34-5.60  



 finding    [address]  Stim Horm)  mcIU/mL      



     [place] [address] [phone]          









 T3 Free  2.40 pg/mL  Low  2.5-3.9  

 

 Free T4 (Free Thyroxine)  0.93 ng/dL  Normal  0.61-1.12  









 1  SEE RESULT BELOW



   -----------------------------------------------------------------------------
---------------



   Name:  YSABEL VERA                    : 1958    Attend Dr: 
Neelam Lizama NP



   Acct:  [id number]  Unit: [id number]  AGE: 61            Location:  [facility]



   Re20                        SEX: F             Status:    REG REF



   -----------------------------------------------------------------------------
---------------



   



   SPEC: [id number]       ALLIE:  20-50   [facility] DR: Neelam Lizama NP



   REQ:  [id number]            RECD:  



   STATUS: COMP



   _



   SOURCE: SPUTUM,EXP     SPDESC:



   ORDERED:  Sputum Cult/GS



   



   -----------------------------------------------------------------------------
---------------



   Procedure                         Result                         Reported   
        Site



   -----------------------------------------------------------------------------
---------------



   



   Sputum Smear  Final                                              20-
1704      ML



   2+ Epithelial Cells



   4+ Neutrophils



   



   4+ Gram Negative Coccobacilli



   3+ Gram Positive Cocci



   2+ Gram Negative Bacilli



   



   Sputum Culture  Final                                            20-
1648      ML



   



   Organism 1                     BRANHAMELLA CATARRHALIS



   Quantity                    3+



   Beta Lactamase              Positive



   Organism 2                     NORMAL JEREMY



   Quantity                    2+



   



   There are no routine laboratory methods for suceptibility



   testing of B.catarrhalis.  Quinolones, augmentin,



   doxycycline, cephalosporins or clarithromycin are agents of



   choice.  (Antibiotic Essentials 5th Edition, 2006 p. 29)



   



   -----------------------------------------------------------------------------
---------------



   * ML - Main Lab



   .



   



   



   



   



   



   



   



   



   ** END OF REPORT **



   



   DEPARTMENT OF PATHOLOGY,  [address] [place] [address]



   Phone # [phone]      Fax #[phone]



   Elvis Mccray M.D. Director     [facility] # [id number]

 

 2  *******Because ethnic data is not always readily available,



   this report includes an eGFR for both -Americans and



   non- Americans.****



   The National Kidney Disease Education Program (NKDEP) does



   not endorse the use of the MDRD equation for patients that



   are not between the ages of 18 and 70, are pregnant, have



   extremes of body size, muscle mass, or nutritional status,



   or are non- or non-.



   According to the National Kidney Foundation, irrespective of



   diagnosis, the stage of the disease is based on the level of



   kidney function:



   Stage Description                      GFR(mL/min/1.73 m(2))



   1     Kidney damage with normal or decreased GFR       90



   2     Kidney damage with mild decrease in GFR          60-89



   3     Moderate decrease in GFR                         30-59



   4     Severe decrease in GFR                           15-29



   5     Kidney failure                       <15 (or dialysis)

 

 3  Desirable: <150



   Borderline High: 150-199



   High: 200-499



   Very High: >500

 

 4  Desirable: <200



   Borderline High: 200-239



   High: >239

 

 5  Low: <40



   Desirable: 40-60



   High: >60

 

 6  Desirable: <100



   Near Optimal: 100-129



   Borderline High: 130-159



   High: 160-189



   Very High: >189







Procedures







 Date  Code  Description  Status

 

 2020  29546  ECHO Transthoracic, Real-Time 2D With Doppler And  Completed



     Color Flow  

 

 2020  70839  ECHO Transthoracic, Real-Time 2D With Doppler And  Completed



     Color Flow  

 

 2015  120420440  Diabetic Retinal Eye Exam  Completed

 

 2014  47680529  Mammogram  Completed

 

 2014  335988815  Diabetic Retinal Eye Exam  Completed

 

 10/29/2008  [id number]  Colonoscopy  Completed

 

 49  [id number]  Colonoscopy  Completed







Medical Devices







 Description

 

 No Information Available







Encounters







 Type  Date  Location  Provider  Dx  Diagnosis

 

 Office Visit  2020  [facility]  Neelam  J45.901  Unspecified 
asthma



   1:00p  [facility]  ALEXIS Lizama    with (acute)



     [facility]      exacerbation









 J44.9  Chronic obstructive pulmonary disease, unspecified

 

 G47.33  Obstructive sleep apnea (adult) (pediatric)

 

 J30.89  Other allergic rhinitis









 Office Visit  2019  9:20a  [facility]  Stacie Kiarra,  Z00.01  
Encounter for



     Medicine - Suite  DO    general adult



     R      medical exam w



           abnormal



           findings









 E11.9  Type 2 diabetes mellitus without complications

 

 I10  Essential (primary) hypertension

 

 Z12.31  Encntr screen mammogram for malignant neoplasm of breast

 

 Z12.11  Encounter for screening for malignant neoplasm of colon

 

 F34.1  Dysthymic disorder









 Office Visit  10/22/2019  [facility]  Lindsey  J45.901  Unspecified asthma



   10:30a  [facility]  MD Vickie    with (acute)



     [facility]ma      exacerbation









 J44.9  Chronic obstructive pulmonary disease, unspecified

 

 G47.33  Obstructive sleep apnea (adult) (pediatric)







Assessments







 Date  Code  Description  Provider

 

 2020  R06.02  Shortness of breath  Vic Woo MD

 

 2020  J45.909  Unspecified asthma, uncomplicated  Neelam Lizama NP

 

 2020  R07.9  Chest pain, unspecified  Vic Woo MD

 

 2020  G47.33  Obstructive sleep apnea (adult) (pediatric)  Neelam Lizama NP

 

 2020  F41.1  Generalized anxiety disorder  Vic Woo MD

 

 2020  J30.89  Other allergic rhinitis  Neelam Lizama NP

 

 2020  I27.20  Pulmonary hypertension, unspecified  Neelam Lizama NP

 

 2020  E66.9  Obesity, unspecified  Neelam Lizama NP

 

 2020  R06.02  Shortness of breath  Rebecca Baldwin M.D.

 

 2020  R06.02  Shortness of breath  [facility]

 

 2020  R06.02  Shortness of breath  Vic Woo MD

 

 2020  J44.9  Chronic obstructive pulmonary disease,  Vic Woo MD



     unspecified  

 

 2020  R07.9  Chest pain, unspecified  Vic Woo MD

 

 2020  E11.9  Type 2 diabetes mellitus without  Vic Woo MD



     complications  

 

 2020  M17.9  Osteoarthritis of knee, unspecified  Vic Woo MD

 

 2020  E66.01  Morbid (severe) obesity due to excess  Vic Woo MD



     calories  

 

 2020  J45.901  Unspecified asthma with (acute)  Neelam Lizama NP



     exacerbation  

 

 2020  J44.9  Chronic obstructive pulmonary disease,  Neelam Lizama NP



     unspecified  

 

 2020  G47.33  Obstructive sleep apnea (adult) (pediatric)  Neelam Lizama NP

 

 2020  J30.89  Other allergic rhinitis  Neelam Lizama NP

 

 2019  Z00.01  Encounter for general adult medical  Stacie Plunkett, 



     examination with abnormal findings  

 

 2019  E11.9  Type 2 diabetes mellitus without  Stacie Plunkett, DO



     complications  

 

 2019  I10  Essential (primary) hypertension  Stacie Plunkett, DO

 

 2019  Z12.31  Encounter for screening mammogram for  Stacie Plunkett, DO



     malignant neoplasm of breast  

 

 2019  Z12.11  Encounter for screening for malignant  Stacie Plunkett, DO



     neoplasm of colon  

 

 2019  F34.1  Dysthymic disorder  Stacie Plunkett, DO

 

 10/22/2019  J45.901  Unspecified asthma with (acute)  Lindsey Johnston MD



     exacerbation  

 

 10/22/2019  J44.9  Chronic obstructive pulmonary disease,  Lindsey Johnston MD



     unspecified  

 

 10/22/2019  G47.33  Obstructive sleep apnea (adult) (pediatric)  Lindsey Johnston MD







Plan of Treatment

Future Appointment(s):2020 10:30 am - Neelam Lizama NP at 
[facility]2020 10:40 am - Stacie Plunkett DO 
at [facility] Internal Medicine - [id number] - Vic Woo, MDR06.02 Shortness of 
yvjvjwY20.9 Chest pain, unspecifiedNew Orders:Stress Test, Pharmacologic 
Nuclear (Lexiscan), Ordered: 20Follow up:after stress testF41.1 
Generalized anxiety disorderNew Medication:Duloxetine HCL 30 mg - 1 by mouth 
every day for 2 weeks then increase to 2 by mouth every day



Functional Status







 Description

 

 No Information Available







Mental Status







 Description

 

 No Information Available







Referrals







 Refer to   Reason for Referral  Status  Appt Date

 

 Steffi Biswas MD  super morbid obesity, interested in gastric  Sent  



   bypass eventually if could lose weight.    









 [address] [facility] [address]

 

 [address]

 

 [place], [place] [address] [phone]

 

 









 [facility]  









 [address] [place] [address]

 

 [phone] Pt was instructed to increase Tegretol 200 mg to three times a day #90  RF 2 on 1/18/2017  New script e-prescribed to [facility] [phone]

## 2020-03-15 NOTE — PN
Subjective


Date of Service: 03/15/20


Interval History: 





HA this AM, better with window closed


Sore throat


cough, SOB


no chest pain 





Objective


Active Medications: 








Acetaminophen (Tylenol Tab*)  650 mg PO Q6H PRN


   PRN Reason: PAIN - MILD


   Last Admin: 03/15/20 08:26 Dose:  650 mg


Albuterol (Ventolin Hfa Inhaler*)  2 puff INH Q6HR PRN


   PRN Reason: SOB/WHEEZING


Albuterol/Ipratropium (Duoneb (Albuterol 2.5 Mg/Ipratropium 0.5 Mg))  1 neb INH 

[id number] AWAKE [facility]


   Last Admin: 03/15/20 06:30 Dose:  1 neb


Atorvastatin Calcium (Lipitor*)  20 mg PO 2100 MAKENNA


Azithromycin (Zithromax Tab*)  250 mg PO 2100 MAKENNA


Cholestyramine Resin (Questran*)  4 gm PO DAILY@2100 MAKENNA


Dextrose (D50w Syringe 50 Ml*)  12.5 gm IV PUSH .FOR FS < 60 - SS PRN


   PRN Reason: FS < 60


Diltiazem HCl (Cardizem Cd Cap*)  120 mg PO DAILY [facility]


   Last Admin: 03/15/20 08:27 Dose:  120 mg


Guaifenesin (Mucinex*)  1,200 mg PO Q12HR MAKENNA


Levothyroxine Sodium (Synthroid Tab*)  175 mcg PO 0900 MAKENNA


Metformin HCl (Glucophage*)  850 mg PO Q12HR [facility]


Sitagliptin Phosphate (Januvia (Nf))  50 mg PO DAILY [facility]; Protocol








 Vital Signs - 8 hr











  03/15/20 03/15/20 03/15/20





  06:34 06:40 08:00


 


Temperature 97.8 F  


 


Pulse Rate 90 86 


 


Respiratory 18 20 20





Rate   


 


Blood Pressure 132/54  





(mmHg)   


 


O2 Sat by Pulse 92 92 





Oximetry   











Oxygen Devices in Use Now: Nasal Cannula - 3L, CPAP


Appearance: lying on left side, appears uncomfortable but not in distress


Eyes: No Scleral Icterus, PERRLA


Ears/Nose/Mouth/Throat: Clear Oropharnyx, Mucous Membranes Moist


Neck: NL Appearance and Movements; NL JVP, Trachea Midline


Respiratory: Symmetrical Chest Expansion and Respiratory Effort, - - rales 

right base, difficult to ascultate left 2/2 positioning 


Cardiovascular: RRR


Abdominal: NL Sounds; No Tenderness; No Distention, No Hepatosplenomegaly


Lymphatic: No Cervical Adenopathy


Extremities: No Edema


Neurological: Alert and Oriented x 3


Result Diagrams: 


 03/14/20 17:56





 03/14/20 17:56


Microbiology and Other Data: 


 Microbiology











 03/15/20 01:43 Legionella Urinary Antigen - Final





 Urine    Negative Legionella Antigen





 Streptococcus pneumoniae Ag Screen - Final





    Negative S. pneumo Antigen














Assess/Plan/Problems-Billing


Assessment: 





62 yo F h/o COPD, ADÁN, suspected obesity hypoventilation syndrome, mod pHTN 

asthma, DM2, migraines p/w fever, chills, cough, headache, sore throat found 

with hypoxia 





- Patient Problems


(1) Acute respiratory failure with hypoxia


Comment: Suspect COPD exacerbation. Hospitalized in past with COPD. -c/w 

steroids/nebs


However, high fever on presentation does not support COPD alone. CXR with poor 

quality. 


Check CT chest now. Determination for additional abx (on azithro alone) based 

on CT findings


respiratory viral panel/COVID-19 testing have been sent 


Hold on additional fluids unless PNA identified and suspected bacterial. Hold 

fluids if suspected COVID


   





(2) DM2 (diabetes mellitus, type 2)


Comment: home does metformin and januvia


hold lispro SS   





(3) ADÁN (obstructive sleep apnea)


Comment: CPAP   





(4) DVT prophylaxis


Comment: 


Lovenox   





(5) Infection requiring isolation precautions


Comment: If hypoxia improves and no bacterial PNA requiring IV abx would 

qualify for home isolation 


Currently lives with sister who runs home day care and would not be able to 

quarantine at home.

## 2020-03-15 NOTE — HP
HISTORY AND PHYSICAL:

 

DATE OF ADMISSION:  20

 

ADMITTING PROVIDER:  Vic Woo MD

 

PRIMARY CARE PROVIDER:  Dr. Stacie Plunkett

 

CHIEF COMPLAINT:  Fever, sore throat, dry cough, headache, shortness of breath.

 

HISTORY OF PRESENT ILLNESS:  Ysabel Vazquez is a 61-year-old female with past 
medical history of non-insulin-dependent diabetes mellitus, hypertension, asthma
, obstructive sleep apnea, suspected obesity hypoventilation syndrome, and 
moderate pulmonary hypertension on recent echo, hypothyroidism, chronic back 
pain, uterine cancer, migraine headaches.  She had been in her normal state of 
health until day prior to admission.  Of note, she works at the [employer] 
and 2 days prior to admission, she lives around people who are coughing 
vigorously and another person who vomited.  She was wearing a mask and gloves 
during her work day.  However, the day prior to admission, she developed some 
chills, fevers, sweats, coughing, headache, sore throat, and some moderate 
chest pressure.  She presented to the [facility] Emergency Room and had a fever of 
101.2 and pulse rate of 110.  She was found to have a new oxygen requirement 
given that she was between 86 and 88 on room air. She was given DuoNeb, 
prednisone 60 mg, Tylenol, and she was swabbed for COVID-19 PCR and referred to 
hospitalist service for admission given her acute hypoxic respiratory failure 
and fever.  She in the room feels somewhat improved and thinks that she might 
have been stable enough to go home, but she did desat again when we took off 
the 2L she was on down the 86.  Another complicating factor is that she is a 
guest, she lives as a guest in the house of her sister, who runs an in-home 
 for 3-year-old children and Ysabel adamantly denies that she would be able 
to self quarantine at home given scarcity of a private bathroom and the 
financial hardship she would impose on her sister given the fact that her 
children would not be able to attend the .  She has recently been 
following up with primary care and also has seen Pulmonary, Neelam Lizama 
twice and PFTs have been ordered and yet not have been scheduled.  She was 
trialed on new medication, Spiriva, but did adjust once and then felt like she 
had some spitting up of some blood, which concerned her, so she stopped it 
after the first dose.

 

Additional workup in [facility] Emergency Room included negative influenza A and B 
rapid swabs, group A strep was also negative.  She was without leukocytosis.  
CRP was 17.3.

 

PAST MEDICAL HISTORY:  Morbid obesity, obstructive sleep apnea, suspected 
obesity hypoventilation syndrome, asthma, hypertension, uterine cancer, 
hyperlipidemia, hypothyroidism, non-insulin-dependent diabetes mellitus, 
migraine headaches.

 

PAST SURGICAL HISTORY:  Hernia repair, D and C's, lap danny,  x3.

 

MEDICATIONS:  Include:

1.  Symbicort 160/4.5 one puff inhaled b.i.d.

2.  Guaifenesin 1200 mg p.o. b.i.d.

3.  Diltiazem 120 mg p.o. daily.

4.  Januvia 50 mg p.o. daily.

5.  Metformin 850 mg p.o. b.i.d.

6.  Synthroid 175 mcg p.o. daily.

7.  Cholestyramine 1 packet p.o. daily.

8.  Atorvastatin 20 mg p.o. daily.

9.  DuoNeb 1 nebulizer inhaled 4 times a day.

10.  Ventolin 2 puffs inhaled q.6 hours p.r.n.

11.  Duloxetine 60 mg daily.

12.  Omega-3 fatty acid.

 

ALLERGIES:  LEVAQUIN.

 

FAMILY HISTORY:  Mother ______.

 

SOCIAL HISTORY:  She is a former smoker, quit 20 years ago.  No alcohol use.  
Surrogate decision maker is her daughter, Ijeoma Hightower.

 

REVIEW OF SYSTEMS:  A complete 14-point review of systems is negative except as 
per HPI.  She denies any abdominal pain, nausea, vomiting, diarrhea, 
constipation.

 

                               PHYSICAL EXAMINATION

 

GENERAL APPEARANCE:  No acute distress.

 

VITAL SIGNS:  Temperature 101.2, pulse rate 110, respiratory rate between 16 
and 22, satting between 94% and 100% on room air, but did desat to 86% to 88% 
when lying flat on room air, blood pressure 149/71.

 

HEENT:  Normocephalic, atraumatic.  Pupils equal, round, and reactive to light.

 

NECK:  Supple.

 

LUNGS:  With some coarse faint rhonchi at the bilateral mid and lower lung 
fields. Moderate air exchange, no wheezing appreciated.

 

HEART:  Regular rate and rhythm.  No murmurs, rubs, or gallops.

 

ABDOMEN:  Soft, nontender, nondistended, but obese.

 

EXTREMITIES:  Warm, well perfused.  No peripheral edema.

 

SKIN:  No lesions, no rashes.

 

NEURO:  Alert, oriented x3.  Moving all extremities.

 

 DIAGNOSTIC STUDIES/LAB DATA:  White count 7.5, hemoglobin 12.6, hematocrit 36, 
platelets 215.  Sodium 132, potassium 4.2, chloride 95, carbon dioxide 30, BUN 
12, creatinine 0.79, glucose 146, lactic acid 0.9, total bili 0.7, AST 15, ALT 
17, alk phos 75, troponin 0.00, and repeat was 0.00.  Five hours later, CRP 17.3
, albumin 3.8, influenza A and B negative.  Group A strep negative.

 

Imaging:  Chest x-ray hard to exclude some patchy interstitial infiltrates or 
atelectasis.

 

EKG:  Normal sinus rhythm, rate 89, normal axis, normal intervals, poor R-wave 
progression, Q waves in 3.  No ST changes.

 

ASSESSMENT AND PLAN:  Ysabel aVzquez is a 61-year-old female with past medical 
history of morbid obesity with a BMI of 58.6, asthma, likely obesity 
hypoventilation syndrome, and some moderate pulmonary hypertension with 55 mmHg 
on the recent echo of 20, who had recent sick contacts at the [employer] at her work 2 days prior to admission, who presents with fever, sore 
throat, headache, cough, chest pressure, and some new oxygen requirements, 
especially when lying flat.  She has been tested and negative for influenza A, B
, and strep throat.  She has fever 101.2 and pulse rate of 110, meeting SIRS 
criteria, but no leukocytosis.  Chest x-ray formal read pending, but hard to 
exclude some diffuse interstitial markings or atelectasis.  I think she has a 
borderline case for admission, but she did again desat and she reports that she 
does not have a safe plan for home quarantine until the COVID-19 PCR test 
results.  This is because she is a guest of her sister's, and with the sister 
running a home  center for 3-year-olds and she does not think that she 
could impose the financial burden for her sister to shut this down. Also, there 
is not a separate bathroom for her to use, as dictated by the [facility] discharge instructions as requirement.  I think it is 
reasonable to monitor her on observation and try to see if she can improve with 
initiation of azithromycin and supportive care.  She is not frankly wheezing 
for me, we will continue her home inhalers of DuoNeb and Symbicort and of note 
she did not tolerate recent initiation attempt of Spiriva with what sounds like 
some scant hemoptysis that scared her.  She was due for outpatient PFTs, those 
will be recommended.  There are some concerns for some potential development of 
COPD and last PFTs that I could see were back from  and as much the results 
with some concern for obesity hypoventilation syndrome.  These all point to a 
bit of a tenuous respiratory baseline with the additional insult of likely 
viral pathogen. I am betting on a pathogen PCR panel, which may potentially 
return faster than the COVID-19 PCR panel that the ED has ordered.  I am 
betting on Strep pneumoniae and legionella urine antigens as well.  For her non-
insulin-dependent diabetes mellitus, continue on metformin, hold her Januvia, 
get point of care testing q.a.c. and h.s. with Lispro sliding scale coverage.  
For her hypothyroidism, continue her levothyroxine.  For her hyperlipidemia, 
continue her atorvastatin.  Continue her recently initiated duloxetine.  She is 
a full code.  Medical surrogate is her daughter, Ijeoma Hightower.  Social work 
consultation may be useful to see if an alternative quarantine location upon 
discharge could be arranged.  She can use the heart healthy carbohydrate 
consistent diet.

 

 

 

[id number]/CPS #: [id number]

MTDD

## 2020-03-16 NOTE — PN
Subjective


Date of Service: 03/16/20


Interval History: 





Feeling better but still weak 


HA resolved


+dry cough 


Sore throat resolved


No N/V


Able to walk back and forth to bathroom 





98% on RA sitting in chair this AM





Objective


Active Medications: 








Acetaminophen (Tylenol Tab*)  650 mg PO Q6H PRN


   PRN Reason: PAIN - MILD


   Last Admin: 03/15/20 19:33 Dose:  650 mg


Albuterol (Ventolin Hfa Inhaler*)  2 puff INH Q6HR PRN


   PRN Reason: SOB/WHEEZING


Albuterol/Ipratropium (Duoneb (Albuterol 2.5 Mg/Ipratropium 0.5 Mg))  1 neb INH 

Q4H PRN


   PRN Reason: SHORTNESS OF BREATH


   Last Admin: 03/15/20 19:33 Dose:  1 neb


Atorvastatin Calcium (Lipitor*)  20 mg PO 2100 [facility]


   Last Admin: 03/15/20 19:33 Dose:  20 mg


Azithromycin (Zithromax Tab*)  250 mg PO 2100 [facility]


   Last Admin: 03/15/20 19:33 Dose:  250 mg


Cholestyramine Resin (Questran*)  4 gm PO DAILY@2100 [facility]


Dextrose (D50w Syringe 50 Ml*)  12.5 gm IV PUSH .FOR FS < 60 - SS PRN


   PRN Reason: FS < 60


Diltiazem HCl (Cardizem Cd Cap*)  120 mg PO DAILY [facility]


   Last Admin: 03/16/20 10:03 Dose:  120 mg


Enoxaparin Sodium (Lovenox(*))  40 mg SUBCUT Q24H [facility]


   Last Admin: 03/15/20 19:33 Dose:  40 mg


Guaifenesin (Mucinex*)  1,200 mg PO Q12HR [facility]


   Last Admin: 03/16/20 10:03 Dose:  1,200 mg


Ceftriaxone Sodium 2 gm/ (Sodium Chloride)  100 mls @ 200 mls/hr IVPB Q24H [facility]


   Last Admin: 03/16/20 10:00 Dose:  200 mls/hr


Levothyroxine Sodium (Synthroid Tab*)  175 mcg PO 0900 [facility]


   Last Admin: 03/16/20 10:23 Dose:  175 mcg


Metformin HCl (Glucophage*)  850 mg PO Q12HR [facility]


   Last Admin: 03/16/20 10:02 Dose:  850 mg


Prednisone (Deltasone 50 Mg Tab)  50 mg PO DAILY [facility]


   Last Admin: 03/16/20 10:03 Dose:  50 mg


Sitagliptin Phosphate (Januvia (Nf))  50 mg PO DAILY [facility]; Protocol


   Last Admin: 03/16/20 10:03 Dose:  Not Given








 Vital Signs - 8 hr











  03/16/20 03/16/20 03/16/20





  07:15 08:00 11:15


 


Temperature 97.6 F  98.0 F


 


Pulse Rate 73  78


 


Respiratory 16 18 20





Rate   


 


Blood Pressure 118/44  130/78





(mmHg)   


 


O2 Sat by Pulse 90  93





Oximetry   











Oxygen Devices in Use Now: None


Appearance: sitting in chair, NAD


Eyes: No Scleral Icterus, PERRLA


Ears/Nose/Mouth/Throat: NL Teeth, Lips, Gums, Clear Oropharnyx


Neck: NL Appearance and Movements; NL JVP, Trachea Midline


Respiratory: Symmetrical Chest Expansion and Respiratory Effort, - - rales 

right base up 1/3


Cardiovascular: NL Sounds; No Murmurs; No JVD, RRR


Abdominal: NL Sounds; No Tenderness; No Distention, No Hepatosplenomegaly


Lymphatic: No Cervical Adenopathy


Extremities: - - non pitting edema in LE


Skin: No Rash or Ulcers


Neurological: Alert and Oriented x 3


Result Diagrams: 


 03/16/20 11:50





 03/16/20 11:50


Microbiology and Other Data: 


 Microbiology











 03/15/20 01:43 Legionella Urinary Antigen - Final





 Urine    Negative Legionella Antigen





 Streptococcus pneumoniae Ag Screen - Final





    Negative S. pneumo Antigen














Assess/Plan/Problems-Billing


Assessment: 





62 yo F h/o COPD, ADÁN, suspected obesity hypoventilation syndrome, mod pHTN 

asthma, DM2, migraines p/w fever, chills, cough, headache, sore throat found 

with hypoxia 





- Patient Problems


(1) Acute respiratory failure with hypoxia


Comment: Resolved - on RA 3/16


Suspect COPD exacerbation. Hospitalized in past with COPD. -c/w steroids/nebs


Check CT with new findings c/w viral and/or bacterial PNA. CT findings are c/w 

COVID although pt's clinic course is rapidly improving with COPD exacerbation 

treatment.


c/w azithro and CTX


respiratory viral panel/COVID-19 testing have been sent 





   





(2) DM2 (diabetes mellitus, type 2)


Comment: home does metformin and januvia


hold lispro SS   





(3) ADÁN (obstructive sleep apnea)


Comment: CPAP   





(4) DVT prophylaxis


Comment: 


Lovenox   





(5) Infection requiring isolation precautions


Comment: COVID testing sent 3/15


Would qualify for home quarantine but shares home with her sister who runs home 

day care and would not be able to quarantine at home.





She will check today if sister is still running  at this time. Even if 

 is closed she shares a full bathroom with sister and her grandkids. 

Possibility of d/c to home without shower for 1-2 days while COVID test is 

pending could be considered depending on need for current bed.

## 2020-03-17 NOTE — DS
CC:  Dr. Stacie Plunkett*

 

[facility] MEDICINE DISCHARGE SUMMARY:

 

DATE OF ADMISSION:  03/15/20

 

DATE OF DISCHARGE:  03/17/20

 

PRIMARY CARE PROVIDER:  Dr. Stacie Plunkett.

 

ATTENDING PHYSICIAN:  Dr. Josefina Benitez* (dictation provided by Josy Naylor NP)
.

 

PRIMARY DIAGNOSES:

1.  Acute hypoxic respiratory failure, now resolved.

2.  Chronic obstructive pulmonary disease exacerbation, now resolved.

3.  COVID-19 ruled out.

 

SECONDARY DIAGNOSES:

1.  Chronic obstructive pulmonary disease.

2.  Morbid obesity.

3.  Obesity hypoventilation syndrome.

4.  Hypothyroidism.

5.  Non-insulin-dependent type 2 diabetes.

6.  Asthma.

7.  Uterine cancer.

8.  Hypertension.

9.  Hyperlipidemia.

10.  Migraine headaches.

 

MEDICATIONS AT THE TIME OF DISCHARGE:

1.  Symbicort 160/4.5 one puff inhaled b.i.d.

2.  Guaifenesin 1200 mg p.o. b.i.d.

3.  Diltiazem 120 mg p.o. daily.

4.  Sitagliptin 50 mg p.o. daily.

5.  Metformin 850 mg p.o. b.i.d.

6.  Levothyroxine 175 mcg p.o. daily.

7.  Cholestyramine 1 packet p.o. daily.

8.  Atorvastatin 20 mg p.o. daily.

9.  Albuterol with ipratropium/Duo Nebulizer 1 nebulization inhaled 4 times a 
day.

10.  Albuterol HandiHaler 2 puffs inhaled q.6 hours p.r.n.

11.  Prednisone 40 mg via taper.

12.  Azithromycin 250 mg p.o. x3 days.

 

HOSPITAL COURSE:  Ms. Vazquez is a 61-year-old female with a past medical 
history of diabetes, COPD and obesity with suspected obesity hypoventilation 
syndrome, who presented to the hospital on 03/14/20 with concern for fever, 
sore throat, dry cough, headache and shortness of breath.  Please see the 
dictated H and P from Dr. Vic Woo for complete details.  In brief, the 
patient stated she was in a normal state of health up until 1 day prior to 
admission when the symptoms noted here occurred.  She has not had any travel, 
but works at [employer] around several people who had some general mild 
illnesses.  In the emergency room, she had no leukocytosis.  Her CRP was 17.31.
  Her flu swab was negative.  Her group A Strep swab was negative.  She had 
chest x-ray that showed increased density overlying the bilateral lungs could 
be due to viral pneumonia or pulmonary edema.  Apparently, appearance may 
simply be due to poor x-ray penetration due to the patient's morbidly obese 
body habitus.

 

Ms. Vazquez was admitted to the hospital.  Based on her symptoms, she was tested 
for COVID-19.

 

She had a chest CT, which showed the following:  "There is a patchy infiltrate 
in the dependent right lower and upper lobes.  The lower lobe infiltrate is 
similar to a CT acquired on 07/23/18 while the upper lobe infiltrate is new to 
less extent, there is a faint subpleural infiltrate in the dependent left lower 
lobe.  Overall, there is a ground-glass opacification of the lungs."  The 
patient continued on antibiotics and isolation for COVID rule out.

 

The patient ultimately did rule out as negative for COVID-19.  



She continued to improve and is now off of all oxygen.  She is ambulating on 
the unit independently. Our suspicion is that she had a COPD exacerbation as 
this consistent with her history.  She had no evidence of pneumonia.  Our plans 
are to complete treatment for COPD with azithromycin and prednisone taper.

 

Ms. Vazquez is medically stable for discharge to home.

 

DISPOSITION:  Home.

 

DIET:  Consistent carbohydrate, heart healthy.

 

ACTIVITY:  As tolerated.

 

FOLLOWUP PLANS:  Please follow up with your primary care physician, Dr. Stacie Plunkett, regarding this acute hospitalization. TIME SPENT:  Approximately 60 
minutes was spent on the discharge of this patient, more than half that time 
was spent with the patient at the bedside reviewing the events leading up to 
this hospitalization, performing the physical examination, and reviewing my 
plan of care.

 

____________________________________ 

JOSY NAYLOR NP

 

[id number]/CPS #: [id number]

NISA

## 2020-03-17 NOTE — PN
Subjective


Date of Service: 03/17/20


Interval History: 





Ms. Vazquez reports that he is feeling better and is happy with the plan for 

discharge to home.  She states that she is feeling much less short  of breath 

with ambulation.  She denies chest pain.











Objective


Active Medications: 





Acetaminophen (Tylenol Tab*)  650 mg PO Q6H PRN


Albuterol (Ventolin Hfa Inhaler*)  2 puff INH Q6HR PRN


Albuterol/Ipratropium (Duoneb (Albuterol 2.5 Mg/Ipratropium 0.5 Mg))  1 neb INH 

Q4H PRN


Atorvastatin Calcium (Lipitor*)  20 mg PO 2100 [facility]


Azithromycin (Zithromax Tab*)  250 mg PO 2100 [facility]


Cholestyramine Resin (Questran*)  4 gm PO DAILY@2100 [facility]


Dextrose (D50w Syringe 50 Ml*)  12.5 gm IV PUSH .FOR FS < 60 - SS PRN


Diltiazem HCl (Cardizem Cd Cap*)  120 mg PO DAILY [facility]


Enoxaparin Sodium (Lovenox(*))  40 mg SUBCUT Q24H [facility]


Guaifenesin (Mucinex*)  1,200 mg PO Q12HR [facility]


Ceftriaxone Sodium 2 gm/ (Sodium Chloride)  100 mls @ 200 mls/hr IVPB Q24H [facility]


Levothyroxine Sodium (Synthroid Tab*)  175 mcg PO 0900 [facility]


Metformin HCl (Glucophage*)  850 mg PO Q12HR [facility]


Prednisone (Deltasone 50 Mg Tab)  50 mg PO DAILY [facility]


Sitagliptin Phosphate (Januvia (Nf))  50 mg PO DAILY [facility]; Protocol





Vital Signs:











Temp Pulse Resp BP Pulse Ox


 


 98.7 F   69   20   135/56   94 


 


 03/17/20 11:15  03/17/20 11:15  03/17/20 11:15  03/17/20 11:15  03/17/20 11:15











Oxygen Devices in Use Now: None


Appearance: female sitting up in chair in NAD


Eyes: No Scleral Icterus


Ears/Nose/Mouth/Throat: Mucous Membranes Moist


Neck: Trachea Midline


Respiratory: Symmetrical Chest Expansion and Respiratory Effort, Clear to 

Auscultation


Cardiovascular: NL Sounds; No Murmurs; No JVD, No Edema


Abdominal: NL Sounds; No Tenderness; No Distention


Extremities: No Edema


Skin: No Rash or Ulcers


Neurological: Alert and Oriented x 3, NL Muscle Strength and Tone


Nutrition: Taking PO's


Result Diagrams: 


 03/16/20 11:50





 03/16/20 11:50





Assess/Plan/Problems-Billing


Assessment: 





60 yo F h/o COPD, ADÁN, suspected obesity hypoventilation syndrome, mod pHTN 

asthma, DM2, migraines p/w fever, chills, cough, headache, sore throat found 

with hypoxia suspected secondary to COPD exacerbation, COVID-19 ruled out.


 





- Patient Problems


(1) Acute respiratory failure with hypoxia


Comment: 


- Resolved


- Suspect COPD exacerbation. Hospitalized in past with COPD. 


- c/w steroids/nebs


- COVID and respiratory virus ruled out   





(2) COPD exacerbation


Comment: 


- Resolving


- Complete course of steroids and azithro   





(3) Infection requiring isolation precautions


Comment: 


-Covid-19 testing negative   





(4) DM2 (diabetes mellitus, type 2)


Comment: 


- resume home meds   





(5) Hypertension


Comment: 


- -130s.


- Continue diltiazem.   





(6) Hypothyroid


Comment: 


- Continue levothyroxine.   





(7) ADÁN (obstructive sleep apnea)


Comment: 


- CPAP   





(8) DVT prophylaxis


Comment: 


Lovenox   





(9) Full code status


Comment: 


   


Status and Disposition: 





Inpatient, discharge to home